# Patient Record
Sex: FEMALE | Race: WHITE | Employment: OTHER | URBAN - METROPOLITAN AREA
[De-identification: names, ages, dates, MRNs, and addresses within clinical notes are randomized per-mention and may not be internally consistent; named-entity substitution may affect disease eponyms.]

---

## 2017-01-03 ENCOUNTER — OFFICE VISIT (OUTPATIENT)
Dept: CARDIOLOGY CLINIC | Age: 82
End: 2017-01-03

## 2017-01-03 ENCOUNTER — ANTI-COAG VISIT (OUTPATIENT)
Dept: PHARMACY | Age: 82
End: 2017-01-03

## 2017-01-03 VITALS
WEIGHT: 159.8 LBS | SYSTOLIC BLOOD PRESSURE: 118 MMHG | HEART RATE: 97 BPM | HEIGHT: 65 IN | OXYGEN SATURATION: 90 % | BODY MASS INDEX: 26.62 KG/M2 | DIASTOLIC BLOOD PRESSURE: 72 MMHG

## 2017-01-03 DIAGNOSIS — I10 ESSENTIAL HYPERTENSION, BENIGN: ICD-10-CM

## 2017-01-03 DIAGNOSIS — R00.2 PALPITATIONS: ICD-10-CM

## 2017-01-03 DIAGNOSIS — I48.19 PERSISTENT ATRIAL FIBRILLATION (HCC): Primary | ICD-10-CM

## 2017-01-03 DIAGNOSIS — I48.91 ATRIAL FIBRILLATION, UNSPECIFIED TYPE (HCC): ICD-10-CM

## 2017-01-03 DIAGNOSIS — I35.0 SEVERE AORTIC STENOSIS: ICD-10-CM

## 2017-01-03 DIAGNOSIS — R06.02 SHORTNESS OF BREATH: ICD-10-CM

## 2017-01-03 DIAGNOSIS — R53.83 FATIGUE, UNSPECIFIED TYPE: ICD-10-CM

## 2017-01-03 LAB — INR BLD: 2.2

## 2017-01-03 PROCEDURE — 93000 ELECTROCARDIOGRAM COMPLETE: CPT | Performed by: INTERNAL MEDICINE

## 2017-01-03 PROCEDURE — 99205 OFFICE O/P NEW HI 60 MIN: CPT | Performed by: INTERNAL MEDICINE

## 2017-01-16 ENCOUNTER — TELEPHONE (OUTPATIENT)
Dept: INTERNAL MEDICINE CLINIC | Age: 82
End: 2017-01-16

## 2017-01-23 ENCOUNTER — ANTI-COAG VISIT (OUTPATIENT)
Dept: PHARMACY | Facility: CLINIC | Age: 82
End: 2017-01-23

## 2017-01-23 DIAGNOSIS — I48.19 PERSISTENT ATRIAL FIBRILLATION (HCC): ICD-10-CM

## 2017-01-23 LAB — INR BLD: 2.1

## 2017-01-31 ENCOUNTER — TELEPHONE (OUTPATIENT)
Dept: CARDIOLOGY CLINIC | Age: 82
End: 2017-01-31

## 2017-02-07 ENCOUNTER — OFFICE VISIT (OUTPATIENT)
Dept: CARDIOLOGY CLINIC | Age: 82
End: 2017-02-07

## 2017-02-07 VITALS
SYSTOLIC BLOOD PRESSURE: 124 MMHG | BODY MASS INDEX: 25.68 KG/M2 | OXYGEN SATURATION: 96 % | HEART RATE: 52 BPM | HEIGHT: 65 IN | DIASTOLIC BLOOD PRESSURE: 56 MMHG | WEIGHT: 154.12 LBS

## 2017-02-07 DIAGNOSIS — I48.0 PAROXYSMAL ATRIAL FIBRILLATION (HCC): Primary | ICD-10-CM

## 2017-02-07 DIAGNOSIS — I10 ESSENTIAL HYPERTENSION, BENIGN: ICD-10-CM

## 2017-02-07 DIAGNOSIS — I35.0 SEVERE AORTIC STENOSIS: ICD-10-CM

## 2017-02-07 DIAGNOSIS — Z95.2 S/P AVR: ICD-10-CM

## 2017-02-07 DIAGNOSIS — I48.91 ATRIAL FIBRILLATION WITH RVR (HCC): ICD-10-CM

## 2017-02-07 DIAGNOSIS — E78.00 PURE HYPERCHOLESTEROLEMIA: ICD-10-CM

## 2017-02-07 DIAGNOSIS — Z79.899 ON AMIODARONE THERAPY: ICD-10-CM

## 2017-02-07 PROCEDURE — G8482 FLU IMMUNIZE ORDER/ADMIN: HCPCS | Performed by: INTERNAL MEDICINE

## 2017-02-07 PROCEDURE — 1036F TOBACCO NON-USER: CPT | Performed by: INTERNAL MEDICINE

## 2017-02-07 PROCEDURE — 1090F PRES/ABSN URINE INCON ASSESS: CPT | Performed by: INTERNAL MEDICINE

## 2017-02-07 PROCEDURE — 4040F PNEUMOC VAC/ADMIN/RCVD: CPT | Performed by: INTERNAL MEDICINE

## 2017-02-07 PROCEDURE — 99214 OFFICE O/P EST MOD 30 MIN: CPT | Performed by: INTERNAL MEDICINE

## 2017-02-07 PROCEDURE — G8420 CALC BMI NORM PARAMETERS: HCPCS | Performed by: INTERNAL MEDICINE

## 2017-02-07 PROCEDURE — G8427 DOCREV CUR MEDS BY ELIG CLIN: HCPCS | Performed by: INTERNAL MEDICINE

## 2017-02-07 PROCEDURE — 1123F ACP DISCUSS/DSCN MKR DOCD: CPT | Performed by: INTERNAL MEDICINE

## 2017-02-07 PROCEDURE — 93000 ELECTROCARDIOGRAM COMPLETE: CPT | Performed by: INTERNAL MEDICINE

## 2017-02-07 RX ORDER — AMIODARONE HYDROCHLORIDE 200 MG/1
100 TABLET ORAL DAILY
Qty: 30 TABLET | Refills: 5
Start: 2017-02-07 | End: 2018-03-12 | Stop reason: SDUPTHER

## 2017-02-07 RX ORDER — FUROSEMIDE 40 MG/1
TABLET ORAL
Qty: 90 TABLET | Refills: 3
Start: 2017-02-07 | End: 2017-07-06 | Stop reason: DRUGHIGH

## 2017-02-20 ENCOUNTER — ANTI-COAG VISIT (OUTPATIENT)
Dept: PHARMACY | Facility: CLINIC | Age: 82
End: 2017-02-20

## 2017-02-20 DIAGNOSIS — Z79.899 ON AMIODARONE THERAPY: ICD-10-CM

## 2017-02-20 DIAGNOSIS — I48.19 PERSISTENT ATRIAL FIBRILLATION (HCC): ICD-10-CM

## 2017-02-20 LAB — INR BLD: 2.5

## 2017-02-21 LAB
ALT SERPL-CCNC: 13 U/L (ref 10–40)
AST SERPL-CCNC: 19 U/L (ref 15–37)
T4 FREE: 1.4 NG/DL (ref 0.9–1.8)
TSH REFLEX: 6.14 UIU/ML (ref 0.27–4.2)

## 2017-02-23 ENCOUNTER — TELEPHONE (OUTPATIENT)
Dept: CARDIOLOGY CLINIC | Age: 82
End: 2017-02-23

## 2017-02-24 ENCOUNTER — OFFICE VISIT (OUTPATIENT)
Dept: INTERNAL MEDICINE CLINIC | Age: 82
End: 2017-02-24

## 2017-02-24 VITALS
SYSTOLIC BLOOD PRESSURE: 150 MMHG | DIASTOLIC BLOOD PRESSURE: 60 MMHG | BODY MASS INDEX: 27.09 KG/M2 | WEIGHT: 162.8 LBS | HEART RATE: 56 BPM

## 2017-02-24 DIAGNOSIS — L29.8 PRURITIC ERYTHEMATOUS RASH: Primary | ICD-10-CM

## 2017-02-24 PROCEDURE — 4040F PNEUMOC VAC/ADMIN/RCVD: CPT | Performed by: INTERNAL MEDICINE

## 2017-02-24 PROCEDURE — G8427 DOCREV CUR MEDS BY ELIG CLIN: HCPCS | Performed by: INTERNAL MEDICINE

## 2017-02-24 PROCEDURE — 1090F PRES/ABSN URINE INCON ASSESS: CPT | Performed by: INTERNAL MEDICINE

## 2017-02-24 PROCEDURE — 99213 OFFICE O/P EST LOW 20 MIN: CPT | Performed by: INTERNAL MEDICINE

## 2017-02-24 PROCEDURE — 1123F ACP DISCUSS/DSCN MKR DOCD: CPT | Performed by: INTERNAL MEDICINE

## 2017-02-24 PROCEDURE — G8420 CALC BMI NORM PARAMETERS: HCPCS | Performed by: INTERNAL MEDICINE

## 2017-02-24 PROCEDURE — 1036F TOBACCO NON-USER: CPT | Performed by: INTERNAL MEDICINE

## 2017-02-24 PROCEDURE — G8482 FLU IMMUNIZE ORDER/ADMIN: HCPCS | Performed by: INTERNAL MEDICINE

## 2017-02-25 ASSESSMENT — ENCOUNTER SYMPTOMS
RESPIRATORY NEGATIVE: 1
ALLERGIC/IMMUNOLOGIC NEGATIVE: 1
EYES NEGATIVE: 1
GASTROINTESTINAL NEGATIVE: 1

## 2017-02-28 ENCOUNTER — TELEPHONE (OUTPATIENT)
Dept: CARDIOLOGY CLINIC | Age: 82
End: 2017-02-28

## 2017-02-28 DIAGNOSIS — E03.9 HYPOTHYROIDISM, UNSPECIFIED TYPE: Primary | ICD-10-CM

## 2017-02-28 DIAGNOSIS — Z79.899 ON AMIODARONE THERAPY: ICD-10-CM

## 2017-03-09 ENCOUNTER — OFFICE VISIT (OUTPATIENT)
Dept: CARDIOLOGY CLINIC | Age: 82
End: 2017-03-09

## 2017-03-09 VITALS
SYSTOLIC BLOOD PRESSURE: 126 MMHG | BODY MASS INDEX: 27.66 KG/M2 | DIASTOLIC BLOOD PRESSURE: 60 MMHG | WEIGHT: 166 LBS | HEART RATE: 60 BPM | HEIGHT: 65 IN

## 2017-03-09 DIAGNOSIS — Z95.2 S/P AVR: Primary | ICD-10-CM

## 2017-03-09 PROCEDURE — G8427 DOCREV CUR MEDS BY ELIG CLIN: HCPCS | Performed by: INTERNAL MEDICINE

## 2017-03-09 PROCEDURE — 1123F ACP DISCUSS/DSCN MKR DOCD: CPT | Performed by: INTERNAL MEDICINE

## 2017-03-09 PROCEDURE — 99214 OFFICE O/P EST MOD 30 MIN: CPT | Performed by: INTERNAL MEDICINE

## 2017-03-09 PROCEDURE — 1036F TOBACCO NON-USER: CPT | Performed by: INTERNAL MEDICINE

## 2017-03-09 PROCEDURE — G8482 FLU IMMUNIZE ORDER/ADMIN: HCPCS | Performed by: INTERNAL MEDICINE

## 2017-03-09 PROCEDURE — G8420 CALC BMI NORM PARAMETERS: HCPCS | Performed by: INTERNAL MEDICINE

## 2017-03-09 PROCEDURE — 1090F PRES/ABSN URINE INCON ASSESS: CPT | Performed by: INTERNAL MEDICINE

## 2017-03-09 PROCEDURE — 4040F PNEUMOC VAC/ADMIN/RCVD: CPT | Performed by: INTERNAL MEDICINE

## 2017-03-20 ENCOUNTER — ANTI-COAG VISIT (OUTPATIENT)
Dept: PHARMACY | Facility: CLINIC | Age: 82
End: 2017-03-20

## 2017-03-20 DIAGNOSIS — Z79.899 ON AMIODARONE THERAPY: ICD-10-CM

## 2017-03-20 DIAGNOSIS — E03.9 HYPOTHYROIDISM, UNSPECIFIED TYPE: ICD-10-CM

## 2017-03-20 DIAGNOSIS — I48.19 PERSISTENT ATRIAL FIBRILLATION (HCC): ICD-10-CM

## 2017-03-20 LAB
INR BLD: 3.4
T4 FREE: 1.5 NG/DL (ref 0.9–1.8)
TSH REFLEX: 7.23 UIU/ML (ref 0.27–4.2)

## 2017-03-21 ENCOUNTER — TELEPHONE (OUTPATIENT)
Dept: CARDIOLOGY CLINIC | Age: 82
End: 2017-03-21

## 2017-03-27 ENCOUNTER — TELEPHONE (OUTPATIENT)
Dept: CARDIOLOGY CLINIC | Age: 82
End: 2017-03-27

## 2017-03-27 ENCOUNTER — TELEPHONE (OUTPATIENT)
Dept: INTERNAL MEDICINE CLINIC | Age: 82
End: 2017-03-27

## 2017-03-28 ENCOUNTER — TELEPHONE (OUTPATIENT)
Dept: CARDIOLOGY | Age: 82
End: 2017-03-28

## 2017-03-28 ENCOUNTER — TELEPHONE (OUTPATIENT)
Dept: INTERNAL MEDICINE CLINIC | Age: 82
End: 2017-03-28

## 2017-03-28 RX ORDER — AMLODIPINE BESYLATE 5 MG/1
5 TABLET ORAL DAILY
Qty: 30 TABLET | Refills: 5 | Status: SHIPPED | OUTPATIENT
Start: 2017-03-28 | End: 2017-05-04 | Stop reason: ALTCHOICE

## 2017-03-29 RX ORDER — WARFARIN SODIUM 5 MG/1
TABLET ORAL
Qty: 30 TABLET | Refills: 0 | Status: SHIPPED | OUTPATIENT
Start: 2017-03-29 | End: 2017-04-17 | Stop reason: SDUPTHER

## 2017-03-30 ENCOUNTER — TELEPHONE (OUTPATIENT)
Dept: CARDIOLOGY | Age: 82
End: 2017-03-30

## 2017-03-30 ENCOUNTER — TELEPHONE (OUTPATIENT)
Dept: INTERNAL MEDICINE CLINIC | Age: 82
End: 2017-03-30

## 2017-03-30 DIAGNOSIS — M54.9 CHRONIC BACK PAIN, UNSPECIFIED BACK LOCATION, UNSPECIFIED BACK PAIN LATERALITY: Primary | ICD-10-CM

## 2017-03-30 DIAGNOSIS — G89.29 CHRONIC BACK PAIN, UNSPECIFIED BACK LOCATION, UNSPECIFIED BACK PAIN LATERALITY: Primary | ICD-10-CM

## 2017-03-30 RX ORDER — LEVOTHYROXINE SODIUM 0.1 MG/1
100 TABLET ORAL DAILY
Qty: 30 TABLET | Refills: 3 | Status: CANCELLED | OUTPATIENT
Start: 2017-03-30

## 2017-03-30 RX ORDER — HYDROCODONE BITARTRATE AND ACETAMINOPHEN 5; 325 MG/1; MG/1
1 TABLET ORAL EVERY 8 HOURS PRN
Qty: 60 TABLET | Refills: 0 | Status: CANCELLED | OUTPATIENT
Start: 2017-03-30 | End: 2017-04-29

## 2017-03-31 ENCOUNTER — TELEPHONE (OUTPATIENT)
Dept: CARDIOLOGY | Age: 82
End: 2017-03-31

## 2017-03-31 DIAGNOSIS — E03.9 HYPOTHYROIDISM, UNSPECIFIED TYPE: ICD-10-CM

## 2017-03-31 RX ORDER — HYDROCODONE BITARTRATE AND ACETAMINOPHEN 5; 325 MG/1; MG/1
1 TABLET ORAL EVERY 8 HOURS PRN
Qty: 60 TABLET | Refills: 0 | Status: SHIPPED | OUTPATIENT
Start: 2017-03-31 | End: 2017-04-30

## 2017-03-31 RX ORDER — LEVOTHYROXINE SODIUM 0.1 MG/1
TABLET ORAL
Qty: 90 TABLET | Refills: 1 | Status: SHIPPED | OUTPATIENT
Start: 2017-03-31 | End: 2017-10-12 | Stop reason: SDUPTHER

## 2017-04-17 ENCOUNTER — ANTI-COAG VISIT (OUTPATIENT)
Dept: PHARMACY | Facility: CLINIC | Age: 82
End: 2017-04-17

## 2017-04-17 DIAGNOSIS — I48.19 PERSISTENT ATRIAL FIBRILLATION (HCC): ICD-10-CM

## 2017-04-17 LAB — INR BLD: 4.1

## 2017-04-18 ENCOUNTER — TELEPHONE (OUTPATIENT)
Dept: INTERNAL MEDICINE CLINIC | Age: 82
End: 2017-04-18

## 2017-04-18 RX ORDER — DEXAMETHASONE 4 MG/1
4 TABLET ORAL 2 TIMES DAILY WITH MEALS
Qty: 20 TABLET | Refills: 0 | Status: SHIPPED | OUTPATIENT
Start: 2017-04-18 | End: 2017-04-28

## 2017-04-21 ENCOUNTER — TELEPHONE (OUTPATIENT)
Dept: CARDIOLOGY CLINIC | Age: 82
End: 2017-04-21

## 2017-04-21 DIAGNOSIS — R42 DIZZINESS: ICD-10-CM

## 2017-04-21 DIAGNOSIS — I48.91 ATRIAL FIBRILLATION WITH RVR (HCC): Primary | ICD-10-CM

## 2017-04-24 ENCOUNTER — TELEPHONE (OUTPATIENT)
Dept: CARDIOLOGY CLINIC | Age: 82
End: 2017-04-24

## 2017-04-24 DIAGNOSIS — E78.2 MIXED HYPERLIPIDEMIA: ICD-10-CM

## 2017-04-24 RX ORDER — ATORVASTATIN CALCIUM 10 MG/1
TABLET, FILM COATED ORAL
Qty: 90 TABLET | Refills: 2 | Status: SHIPPED | OUTPATIENT
Start: 2017-04-24 | End: 2017-09-05 | Stop reason: ALTCHOICE

## 2017-04-26 ENCOUNTER — NURSE ONLY (OUTPATIENT)
Dept: CARDIOLOGY CLINIC | Age: 82
End: 2017-04-26

## 2017-04-26 DIAGNOSIS — I48.91 ATRIAL FIBRILLATION WITH RVR (HCC): ICD-10-CM

## 2017-04-26 DIAGNOSIS — I48.0 PAROXYSMAL ATRIAL FIBRILLATION (HCC): Primary | ICD-10-CM

## 2017-04-26 DIAGNOSIS — R42 DIZZINESS: ICD-10-CM

## 2017-04-30 PROCEDURE — 93225 XTRNL ECG REC<48 HRS REC: CPT | Performed by: INTERNAL MEDICINE

## 2017-05-03 ENCOUNTER — OFFICE VISIT (OUTPATIENT)
Dept: INTERNAL MEDICINE CLINIC | Age: 82
End: 2017-05-03

## 2017-05-03 ENCOUNTER — HOSPITAL ENCOUNTER (OUTPATIENT)
Dept: OTHER | Age: 82
Discharge: HOME | End: 2017-05-03
Attending: NURSE PRACTITIONER | Admitting: NURSE PRACTITIONER

## 2017-05-03 VITALS — HEART RATE: 56 BPM | SYSTOLIC BLOOD PRESSURE: 112 MMHG | DIASTOLIC BLOOD PRESSURE: 58 MMHG
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
## 2017-05-03 DIAGNOSIS — M54.50 CHRONIC RIGHT-SIDED LOW BACK PAIN WITHOUT SCIATICA: ICD-10-CM

## 2017-05-03 DIAGNOSIS — G89.29 CHRONIC RIGHT SACROILIAC PAIN: ICD-10-CM

## 2017-05-03 DIAGNOSIS — I10 ESSENTIAL HYPERTENSION, BENIGN: ICD-10-CM

## 2017-05-03 DIAGNOSIS — Z74.09 DECREASED AMBULATION STATUS: Primary | ICD-10-CM
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
## 2017-05-03 DIAGNOSIS — B37.2 CANDIDAL INTERTRIGO: ICD-10-CM

## 2017-05-03 DIAGNOSIS — M53.3 CHRONIC RIGHT SACROILIAC PAIN: ICD-10-CM

## 2017-05-03 DIAGNOSIS — I48.19 PERSISTENT ATRIAL FIBRILLATION (HCC): ICD-10-CM

## 2017-05-03 DIAGNOSIS — G89.29 CHRONIC RIGHT-SIDED LOW BACK PAIN WITHOUT SCIATICA: ICD-10-CM

## 2017-05-03 DIAGNOSIS — Z23 NEED FOR PNEUMOCOCCAL VACCINATION: ICD-10-CM

## 2017-05-03 PROCEDURE — 1090F PRES/ABSN URINE INCON ASSESS: CPT | Performed by: NURSE PRACTITIONER

## 2017-05-03 PROCEDURE — G0009 ADMIN PNEUMOCOCCAL VACCINE: HCPCS | Performed by: NURSE PRACTITIONER

## 2017-05-03 PROCEDURE — 90670 PCV13 VACCINE IM: CPT | Performed by: NURSE PRACTITIONER

## 2017-05-03 PROCEDURE — 93227 XTRNL ECG REC<48 HR R&I: CPT | Performed by: INTERNAL MEDICINE

## 2017-05-03 PROCEDURE — 4040F PNEUMOC VAC/ADMIN/RCVD: CPT | Performed by: NURSE PRACTITIONER

## 2017-05-03 PROCEDURE — G8427 DOCREV CUR MEDS BY ELIG CLIN: HCPCS | Performed by: NURSE PRACTITIONER

## 2017-05-03 PROCEDURE — 1036F TOBACCO NON-USER: CPT | Performed by: NURSE PRACTITIONER

## 2017-05-03 PROCEDURE — 99214 OFFICE O/P EST MOD 30 MIN: CPT | Performed by: NURSE PRACTITIONER

## 2017-05-03 PROCEDURE — 1123F ACP DISCUSS/DSCN MKR DOCD: CPT | Performed by: NURSE PRACTITIONER

## 2017-05-03 PROCEDURE — G8420 CALC BMI NORM PARAMETERS: HCPCS | Performed by: NURSE PRACTITIONER

## 2017-05-03 RX ORDER — NYSTATIN 100000 [USP'U]/G
POWDER TOPICAL
Qty: 30 G | Refills: 3 | Status: SHIPPED | OUTPATIENT
Start: 2017-05-03

## 2017-05-03 ASSESSMENT — ENCOUNTER SYMPTOMS
SHORTNESS OF BREATH: 0
GASTROINTESTINAL NEGATIVE: 1
BACK PAIN: 1

## 2017-05-03 ASSESSMENT — PATIENT HEALTH QUESTIONNAIRE - PHQ9
SUM OF ALL RESPONSES TO PHQ9 QUESTIONS 1 & 2: 2
SUM OF ALL RESPONSES TO PHQ QUESTIONS 1-9: 2
1. LITTLE INTEREST OR PLEASURE IN DOING THINGS: 1
2. FEELING DOWN, DEPRESSED OR HOPELESS: 1

## 2017-05-04 ENCOUNTER — TELEPHONE (OUTPATIENT)
Dept: CARDIOLOGY CLINIC | Age: 82
End: 2017-05-04

## 2017-05-08 ENCOUNTER — ANTI-COAG VISIT (OUTPATIENT)
Dept: PHARMACY | Facility: CLINIC | Age: 82
End: 2017-05-08

## 2017-05-08 ENCOUNTER — TELEPHONE (OUTPATIENT)
Dept: RHEUMATOLOGY | Age: 82
End: 2017-05-08

## 2017-05-08 DIAGNOSIS — I48.19 PERSISTENT ATRIAL FIBRILLATION (HCC): ICD-10-CM

## 2017-05-08 LAB — INR BLD: 5.7

## 2017-05-08 RX ORDER — HYDROCODONE BITARTRATE AND ACETAMINOPHEN 5; 325 MG/1; MG/1
1 TABLET ORAL EVERY 8 HOURS PRN
COMMUNITY
End: 2017-05-10 | Stop reason: SDUPTHER

## 2017-05-10 ENCOUNTER — TELEPHONE (OUTPATIENT)
Dept: INTERNAL MEDICINE CLINIC | Age: 82
End: 2017-05-10

## 2017-05-10 RX ORDER — HYDROCODONE BITARTRATE AND ACETAMINOPHEN 5; 325 MG/1; MG/1
1 TABLET ORAL EVERY 8 HOURS PRN
Qty: 90 TABLET | Refills: 0 | Status: SHIPPED | OUTPATIENT
Start: 2017-05-10 | End: 2017-06-26

## 2017-05-15 ENCOUNTER — ANTI-COAG VISIT (OUTPATIENT)
Dept: PHARMACY | Facility: CLINIC | Age: 82
End: 2017-05-15

## 2017-05-15 DIAGNOSIS — I48.19 PERSISTENT ATRIAL FIBRILLATION (HCC): ICD-10-CM

## 2017-05-15 LAB — INR BLD: 1

## 2017-05-22 ENCOUNTER — ANTI-COAG VISIT (OUTPATIENT)
Dept: PHARMACY | Facility: CLINIC | Age: 82
End: 2017-05-22

## 2017-05-22 DIAGNOSIS — I48.19 PERSISTENT ATRIAL FIBRILLATION (HCC): ICD-10-CM

## 2017-05-22 LAB — INR BLD: 1.3

## 2017-05-23 ENCOUNTER — TELEPHONE (OUTPATIENT)
Dept: CARDIOLOGY CLINIC | Age: 82
End: 2017-05-23

## 2017-05-30 ENCOUNTER — ANTI-COAG VISIT (OUTPATIENT)
Dept: PHARMACY | Facility: CLINIC | Age: 82
End: 2017-05-30

## 2017-05-30 DIAGNOSIS — I48.19 PERSISTENT ATRIAL FIBRILLATION (HCC): ICD-10-CM

## 2017-05-30 LAB — INR BLD: 1.6

## 2017-06-01 ENCOUNTER — TELEPHONE (OUTPATIENT)
Dept: PHARMACY | Facility: CLINIC | Age: 82
End: 2017-06-01

## 2017-06-01 RX ORDER — WARFARIN SODIUM 5 MG/1
TABLET ORAL
Qty: 30 TABLET | Refills: 11 | Status: SHIPPED | OUTPATIENT
Start: 2017-06-01 | End: 2017-06-12 | Stop reason: SDUPTHER

## 2017-06-12 ENCOUNTER — TELEPHONE (OUTPATIENT)
Dept: INTERNAL MEDICINE CLINIC | Age: 82
End: 2017-06-12

## 2017-06-12 ENCOUNTER — ANTI-COAG VISIT (OUTPATIENT)
Dept: PHARMACY | Facility: CLINIC | Age: 82
End: 2017-06-12

## 2017-06-12 DIAGNOSIS — I48.19 PERSISTENT ATRIAL FIBRILLATION (HCC): ICD-10-CM

## 2017-06-12 LAB — INR BLD: 1.8

## 2017-06-26 ENCOUNTER — HOSPITAL ENCOUNTER (OUTPATIENT)
Dept: INFUSION THERAPY | Age: 82
Discharge: HOME | End: 2017-06-30
Attending: INTERNAL MEDICINE | Admitting: INTERNAL MEDICINE

## 2017-06-26 ENCOUNTER — ANTI-COAG VISIT (OUTPATIENT)
Dept: PHARMACY | Facility: CLINIC | Age: 82
End: 2017-06-26

## 2017-06-26 VITALS
TEMPERATURE: 98.4 F | OXYGEN SATURATION: 99 % | RESPIRATION RATE: 17 BRPM | HEART RATE: 64 BPM | SYSTOLIC BLOOD PRESSURE: 146 MMHG | DIASTOLIC BLOOD PRESSURE: 84 MMHG

## 2017-06-26 DIAGNOSIS — I48.19 PERSISTENT ATRIAL FIBRILLATION (HCC): ICD-10-CM

## 2017-06-26 LAB — INR BLD: 2.1

## 2017-06-26 RX ORDER — SODIUM CHLORIDE 0.9 % (FLUSH) 0.9 %
10 SYRINGE (ML) INJECTION PRN
Status: DISCONTINUED | OUTPATIENT
Start: 2017-06-26 | End: 2017-07-01 | Stop reason: HOSPADM

## 2017-06-26 RX ORDER — METRONIDAZOLE 7.5 MG/G
1 GEL TOPICAL DAILY PRN
COMMUNITY

## 2017-06-26 RX ADMIN — Medication 10 ML: at 13:09

## 2017-07-03 ENCOUNTER — HOSPITAL ENCOUNTER (OUTPATIENT)
Dept: INFUSION THERAPY | Age: 82
Discharge: HOME | End: 2017-07-04
Admitting: INTERNAL MEDICINE

## 2017-07-03 VITALS
DIASTOLIC BLOOD PRESSURE: 68 MMHG | HEART RATE: 68 BPM | TEMPERATURE: 97.9 F | SYSTOLIC BLOOD PRESSURE: 152 MMHG | OXYGEN SATURATION: 97 % | RESPIRATION RATE: 17 BRPM

## 2017-07-03 RX ORDER — SODIUM CHLORIDE 0.9 % (FLUSH) 0.9 %
10 SYRINGE (ML) INJECTION PRN
Status: DISCONTINUED | OUTPATIENT
Start: 2017-07-03 | End: 2018-05-01 | Stop reason: HOSPADM

## 2017-07-03 RX ORDER — SODIUM CHLORIDE 9 MG/ML
INJECTION, SOLUTION INTRAVENOUS CONTINUOUS
Status: DISCONTINUED | OUTPATIENT
Start: 2017-07-03 | End: 2018-05-01 | Stop reason: HOSPADM

## 2017-07-03 RX ADMIN — Medication 10 ML: at 14:17

## 2017-07-03 RX ADMIN — SODIUM CHLORIDE: 9 INJECTION, SOLUTION INTRAVENOUS at 12:28

## 2017-07-03 RX ADMIN — Medication 10 ML: at 14:32

## 2017-07-03 RX ADMIN — Medication 10 ML: at 13:15

## 2017-07-03 RX ADMIN — Medication 20 ML: at 12:28

## 2017-07-05 ENCOUNTER — TELEPHONE (OUTPATIENT)
Dept: CARDIOLOGY CLINIC | Age: 82
End: 2017-07-05

## 2017-07-06 ENCOUNTER — HOSPITAL ENCOUNTER (OUTPATIENT)
Dept: ENDOSCOPY | Age: 82
Discharge: HOME | End: 2017-07-06
Attending: INTERNAL MEDICINE | Admitting: INTERNAL MEDICINE

## 2017-07-06 ENCOUNTER — OFFICE VISIT (OUTPATIENT)
Dept: CARDIOLOGY CLINIC | Age: 82
End: 2017-07-06

## 2017-07-06 VITALS
HEART RATE: 68 BPM | DIASTOLIC BLOOD PRESSURE: 56 MMHG | SYSTOLIC BLOOD PRESSURE: 138 MMHG | BODY MASS INDEX: 25.99 KG/M2 | WEIGHT: 156 LBS | HEIGHT: 65 IN

## 2017-07-06 DIAGNOSIS — Z95.2 S/P TAVR (TRANSCATHETER AORTIC VALVE REPLACEMENT): ICD-10-CM

## 2017-07-06 DIAGNOSIS — I35.0 NONRHEUMATIC AORTIC VALVE STENOSIS: Primary | ICD-10-CM

## 2017-07-06 DIAGNOSIS — I48.0 PAROXYSMAL ATRIAL FIBRILLATION (HCC): ICD-10-CM

## 2017-07-06 DIAGNOSIS — I10 ESSENTIAL HYPERTENSION: ICD-10-CM

## 2017-07-06 DIAGNOSIS — E78.00 HYPERCHOLESTEREMIA: ICD-10-CM

## 2017-07-06 PROCEDURE — 99214 OFFICE O/P EST MOD 30 MIN: CPT | Performed by: INTERNAL MEDICINE

## 2017-07-06 PROCEDURE — G8427 DOCREV CUR MEDS BY ELIG CLIN: HCPCS | Performed by: INTERNAL MEDICINE

## 2017-07-06 PROCEDURE — 1123F ACP DISCUSS/DSCN MKR DOCD: CPT | Performed by: INTERNAL MEDICINE

## 2017-07-06 PROCEDURE — 1036F TOBACCO NON-USER: CPT | Performed by: INTERNAL MEDICINE

## 2017-07-06 PROCEDURE — 4040F PNEUMOC VAC/ADMIN/RCVD: CPT | Performed by: INTERNAL MEDICINE

## 2017-07-06 PROCEDURE — 1090F PRES/ABSN URINE INCON ASSESS: CPT | Performed by: INTERNAL MEDICINE

## 2017-07-06 PROCEDURE — G8419 CALC BMI OUT NRM PARAM NOF/U: HCPCS | Performed by: INTERNAL MEDICINE

## 2017-07-06 RX ORDER — FUROSEMIDE 20 MG/1
TABLET ORAL
Qty: 90 TABLET | Refills: 3 | Status: SHIPPED
Start: 2017-07-06 | End: 2017-11-06 | Stop reason: SDUPTHER

## 2017-07-10 ENCOUNTER — HOSPITAL ENCOUNTER (OUTPATIENT)
Dept: OTHER | Age: 82
Discharge: HOME | End: 2017-07-10
Attending: INTERNAL MEDICINE | Admitting: INTERNAL MEDICINE

## 2017-07-10 ENCOUNTER — OFFICE VISIT (OUTPATIENT)
Dept: INTERNAL MEDICINE CLINIC | Age: 82
End: 2017-07-10

## 2017-07-10 VITALS
DIASTOLIC BLOOD PRESSURE: 72 MMHG | RESPIRATION RATE: 14 BRPM | BODY MASS INDEX: 25.89 KG/M2 | HEART RATE: 64 BPM | SYSTOLIC BLOOD PRESSURE: 128 MMHG | WEIGHT: 155.6 LBS

## 2017-07-10 DIAGNOSIS — E03.9 HYPOTHYROIDISM, UNSPECIFIED TYPE: ICD-10-CM

## 2017-07-10 DIAGNOSIS — N39.41 URGE INCONTINENCE OF URINE: Primary | ICD-10-CM

## 2017-07-10 DIAGNOSIS — I10 ESSENTIAL HYPERTENSION, BENIGN: ICD-10-CM

## 2017-07-10 LAB — TSH SERPL DL<=0.05 MIU/L-ACNC: 3.79 UIU/ML (ref 0.27–4.2)

## 2017-07-10 PROCEDURE — 1090F PRES/ABSN URINE INCON ASSESS: CPT | Performed by: INTERNAL MEDICINE

## 2017-07-10 PROCEDURE — 4040F PNEUMOC VAC/ADMIN/RCVD: CPT | Performed by: INTERNAL MEDICINE

## 2017-07-10 PROCEDURE — 0509F URINE INCON PLAN DOCD: CPT | Performed by: INTERNAL MEDICINE

## 2017-07-10 PROCEDURE — 99213 OFFICE O/P EST LOW 20 MIN: CPT | Performed by: INTERNAL MEDICINE

## 2017-07-10 PROCEDURE — G8427 DOCREV CUR MEDS BY ELIG CLIN: HCPCS | Performed by: INTERNAL MEDICINE

## 2017-07-10 PROCEDURE — 1123F ACP DISCUSS/DSCN MKR DOCD: CPT | Performed by: INTERNAL MEDICINE

## 2017-07-10 PROCEDURE — G8419 CALC BMI OUT NRM PARAM NOF/U: HCPCS | Performed by: INTERNAL MEDICINE

## 2017-07-10 PROCEDURE — 1036F TOBACCO NON-USER: CPT | Performed by: INTERNAL MEDICINE

## 2017-07-13 ENCOUNTER — TELEPHONE (OUTPATIENT)
Dept: RHEUMATOLOGY | Age: 82
End: 2017-07-13

## 2017-07-16 ASSESSMENT — ENCOUNTER SYMPTOMS
EYES NEGATIVE: 1
RESPIRATORY NEGATIVE: 1
ALLERGIC/IMMUNOLOGIC NEGATIVE: 1
GASTROINTESTINAL NEGATIVE: 1

## 2017-07-17 ENCOUNTER — ANTI-COAG VISIT (OUTPATIENT)
Dept: PHARMACY | Facility: CLINIC | Age: 82
End: 2017-07-17

## 2017-07-17 DIAGNOSIS — I48.19 PERSISTENT ATRIAL FIBRILLATION (HCC): ICD-10-CM

## 2017-07-17 LAB — INR BLD: 1.5

## 2017-07-24 DIAGNOSIS — N39.41 URGE INCONTINENCE OF URINE: ICD-10-CM

## 2017-07-27 ENCOUNTER — HOSPITAL ENCOUNTER (OUTPATIENT)
Dept: NON INVASIVE DIAGNOSTICS | Age: 82
Discharge: HOME | End: 2017-07-27
Attending: INTERNAL MEDICINE | Admitting: INTERNAL MEDICINE

## 2017-07-27 DIAGNOSIS — I35.0 NONRHEUMATIC AORTIC VALVE STENOSIS: ICD-10-CM

## 2017-07-27 LAB
LEFT VENTRICULAR EJECTION FRACTION HIGH VALUE: 60 %
LEFT VENTRICULAR EJECTION FRACTION MODE: NORMAL
LV EF: 60 %
LVEF MODALITY: NORMAL

## 2017-07-28 ENCOUNTER — TELEPHONE (OUTPATIENT)
Dept: INTERNAL MEDICINE CLINIC | Age: 82
End: 2017-07-28

## 2017-07-28 RX ORDER — TROSPIUM CHLORIDE 20 MG/1
20 TABLET, FILM COATED ORAL 2 TIMES DAILY
Qty: 60 TABLET | Refills: 0 | Status: SHIPPED | OUTPATIENT
Start: 2017-07-28 | End: 2017-07-28 | Stop reason: SDUPTHER

## 2017-07-31 ENCOUNTER — ANTI-COAG VISIT (OUTPATIENT)
Dept: PHARMACY | Facility: CLINIC | Age: 82
End: 2017-07-31

## 2017-07-31 DIAGNOSIS — I48.19 PERSISTENT ATRIAL FIBRILLATION (HCC): ICD-10-CM

## 2017-07-31 LAB — INR BLD: 2.8

## 2017-07-31 RX ORDER — TROSPIUM CHLORIDE 20 MG/1
TABLET, FILM COATED ORAL
Qty: 180 TABLET | Refills: 0 | Status: SHIPPED | OUTPATIENT
Start: 2017-07-31 | End: 2018-03-19 | Stop reason: ALTCHOICE

## 2017-08-04 ENCOUNTER — TELEPHONE (OUTPATIENT)
Dept: CARDIOLOGY CLINIC | Age: 82
End: 2017-08-04

## 2017-08-07 ENCOUNTER — OFFICE VISIT (OUTPATIENT)
Dept: CARDIOLOGY CLINIC | Age: 82
End: 2017-08-07

## 2017-08-07 VITALS
HEIGHT: 65 IN | SYSTOLIC BLOOD PRESSURE: 138 MMHG | HEART RATE: 92 BPM | OXYGEN SATURATION: 94 % | BODY MASS INDEX: 25.83 KG/M2 | DIASTOLIC BLOOD PRESSURE: 72 MMHG | WEIGHT: 155 LBS

## 2017-08-07 DIAGNOSIS — I34.0 NON-RHEUMATIC MITRAL REGURGITATION: ICD-10-CM

## 2017-08-07 DIAGNOSIS — I35.0 NONRHEUMATIC AORTIC VALVE STENOSIS: ICD-10-CM

## 2017-08-07 DIAGNOSIS — I48.91 ATRIAL FIBRILLATION WITH RVR (HCC): Primary | ICD-10-CM

## 2017-08-07 DIAGNOSIS — Z79.899 ON AMIODARONE THERAPY: ICD-10-CM

## 2017-08-07 DIAGNOSIS — Z95.2 S/P AVR: ICD-10-CM

## 2017-08-07 DIAGNOSIS — E78.00 PURE HYPERCHOLESTEROLEMIA: ICD-10-CM

## 2017-08-07 PROCEDURE — 1090F PRES/ABSN URINE INCON ASSESS: CPT | Performed by: INTERNAL MEDICINE

## 2017-08-07 PROCEDURE — 93000 ELECTROCARDIOGRAM COMPLETE: CPT | Performed by: INTERNAL MEDICINE

## 2017-08-07 PROCEDURE — G8419 CALC BMI OUT NRM PARAM NOF/U: HCPCS | Performed by: INTERNAL MEDICINE

## 2017-08-07 PROCEDURE — G8427 DOCREV CUR MEDS BY ELIG CLIN: HCPCS | Performed by: INTERNAL MEDICINE

## 2017-08-07 PROCEDURE — 1036F TOBACCO NON-USER: CPT | Performed by: INTERNAL MEDICINE

## 2017-08-07 PROCEDURE — 4040F PNEUMOC VAC/ADMIN/RCVD: CPT | Performed by: INTERNAL MEDICINE

## 2017-08-07 PROCEDURE — 1123F ACP DISCUSS/DSCN MKR DOCD: CPT | Performed by: INTERNAL MEDICINE

## 2017-08-07 PROCEDURE — 99214 OFFICE O/P EST MOD 30 MIN: CPT | Performed by: INTERNAL MEDICINE

## 2017-08-14 ENCOUNTER — ANTI-COAG VISIT (OUTPATIENT)
Dept: PHARMACY | Facility: CLINIC | Age: 82
End: 2017-08-14

## 2017-08-14 DIAGNOSIS — Z79.899 ON AMIODARONE THERAPY: ICD-10-CM

## 2017-08-14 DIAGNOSIS — I48.19 PERSISTENT ATRIAL FIBRILLATION (HCC): ICD-10-CM

## 2017-08-14 LAB — INR BLD: 3.4

## 2017-08-15 LAB
ALT SERPL-CCNC: 12 U/L (ref 10–40)
AST SERPL-CCNC: 17 U/L (ref 15–37)
BASOPHILS ABSOLUTE: 0 K/UL (ref 0–0.2)
BASOPHILS RELATIVE PERCENT: 0.5 %
EOSINOPHILS ABSOLUTE: 0.2 K/UL (ref 0–0.6)
EOSINOPHILS RELATIVE PERCENT: 3.2 %
FERRITIN: 598.2 NG/ML (ref 15–150)
HCT VFR BLD CALC: 36.9 % (ref 36–48)
HEMOGLOBIN: 12.2 G/DL (ref 12–16)
IRON SATURATION: 23 % (ref 15–50)
IRON: 37 UG/DL (ref 37–145)
LYMPHOCYTES ABSOLUTE: 1.9 K/UL (ref 1–5.1)
LYMPHOCYTES RELATIVE PERCENT: 29.9 %
MCH RBC QN AUTO: 30.5 PG (ref 26–34)
MCHC RBC AUTO-ENTMCNC: 33 G/DL (ref 31–36)
MCV RBC AUTO: 92.3 FL (ref 80–100)
MONOCYTES ABSOLUTE: 0.5 K/UL (ref 0–1.3)
MONOCYTES RELATIVE PERCENT: 8.2 %
NEUTROPHILS ABSOLUTE: 3.8 K/UL (ref 1.7–7.7)
NEUTROPHILS RELATIVE PERCENT: 58.2 %
PDW BLD-RTO: 21.9 % (ref 12.4–15.4)
PLATELET # BLD: 164 K/UL (ref 135–450)
PMV BLD AUTO: 7.7 FL (ref 5–10.5)
RBC # BLD: 4 M/UL (ref 4–5.2)
TOTAL IRON BINDING CAPACITY: 158 UG/DL (ref 260–445)
WBC # BLD: 6.5 K/UL (ref 4–11)

## 2017-08-17 DIAGNOSIS — I48.91 ATRIAL FIBRILLATION WITH RVR (HCC): ICD-10-CM

## 2017-08-21 ENCOUNTER — OFFICE VISIT (OUTPATIENT)
Dept: INTERNAL MEDICINE CLINIC | Age: 82
End: 2017-08-21

## 2017-08-21 VITALS
BODY MASS INDEX: 25.89 KG/M2 | SYSTOLIC BLOOD PRESSURE: 148 MMHG | RESPIRATION RATE: 14 BRPM | DIASTOLIC BLOOD PRESSURE: 68 MMHG | HEART RATE: 72 BPM | WEIGHT: 155.6 LBS

## 2017-08-21 DIAGNOSIS — I10 ESSENTIAL HYPERTENSION, BENIGN: ICD-10-CM

## 2017-08-21 DIAGNOSIS — N39.41 URGE INCONTINENCE OF URINE: Primary | ICD-10-CM

## 2017-08-21 PROCEDURE — G8427 DOCREV CUR MEDS BY ELIG CLIN: HCPCS | Performed by: INTERNAL MEDICINE

## 2017-08-21 PROCEDURE — 1090F PRES/ABSN URINE INCON ASSESS: CPT | Performed by: INTERNAL MEDICINE

## 2017-08-21 PROCEDURE — 99213 OFFICE O/P EST LOW 20 MIN: CPT | Performed by: INTERNAL MEDICINE

## 2017-08-21 PROCEDURE — 0509F URINE INCON PLAN DOCD: CPT | Performed by: INTERNAL MEDICINE

## 2017-08-21 PROCEDURE — 1036F TOBACCO NON-USER: CPT | Performed by: INTERNAL MEDICINE

## 2017-08-21 PROCEDURE — 1123F ACP DISCUSS/DSCN MKR DOCD: CPT | Performed by: INTERNAL MEDICINE

## 2017-08-21 PROCEDURE — G8419 CALC BMI OUT NRM PARAM NOF/U: HCPCS | Performed by: INTERNAL MEDICINE

## 2017-08-21 PROCEDURE — 4040F PNEUMOC VAC/ADMIN/RCVD: CPT | Performed by: INTERNAL MEDICINE

## 2017-08-27 ASSESSMENT — ENCOUNTER SYMPTOMS
ALLERGIC/IMMUNOLOGIC NEGATIVE: 1
EYES NEGATIVE: 1
GASTROINTESTINAL NEGATIVE: 1
RESPIRATORY NEGATIVE: 1

## 2017-09-05 ENCOUNTER — ANTI-COAG VISIT (OUTPATIENT)
Dept: PHARMACY | Facility: CLINIC | Age: 82
End: 2017-09-05

## 2017-09-05 ENCOUNTER — TELEPHONE (OUTPATIENT)
Dept: RHEUMATOLOGY | Age: 82
End: 2017-09-05

## 2017-09-05 DIAGNOSIS — I48.19 PERSISTENT ATRIAL FIBRILLATION (HCC): ICD-10-CM

## 2017-09-05 DIAGNOSIS — M71.9 BURSITIS, UNSPECIFIED SITE: Primary | ICD-10-CM

## 2017-09-05 LAB — INR BLD: 3

## 2017-09-08 ENCOUNTER — CARE COORDINATION (OUTPATIENT)
Dept: CARE COORDINATION | Age: 82
End: 2017-09-08

## 2017-09-19 ENCOUNTER — TELEPHONE (OUTPATIENT)
Dept: INTERNAL MEDICINE CLINIC | Age: 82
End: 2017-09-19

## 2017-09-20 ENCOUNTER — CARE COORDINATION (OUTPATIENT)
Dept: CARE COORDINATION | Age: 82
End: 2017-09-20

## 2017-09-27 ENCOUNTER — CARE COORDINATION (OUTPATIENT)
Dept: INTERNAL MEDICINE CLINIC | Age: 82
End: 2017-09-27

## 2017-10-02 ENCOUNTER — HOSPITAL ENCOUNTER (OUTPATIENT)
Dept: OTHER | Age: 82
Discharge: HOME | End: 2017-10-02
Attending: INTERNAL MEDICINE | Admitting: INTERNAL MEDICINE

## 2017-10-02 ENCOUNTER — ANTI-COAG VISIT (OUTPATIENT)
Dept: PHARMACY | Facility: CLINIC | Age: 82
End: 2017-10-02

## 2017-10-02 DIAGNOSIS — I48.19 PERSISTENT ATRIAL FIBRILLATION (HCC): ICD-10-CM

## 2017-10-02 LAB — INR BLD: 2.5

## 2017-10-02 NOTE — MR AVS SNAPSHOT
After Visit Summary             Hulon Metro   10/2/2017 1:45 PM   Anti-coag visit    Description:  Female : 1922   Provider:  Khanh Dailey, [address] [facility]   Department:  PHYSICIANS [facility]              Your Follow-Up and Future Appointments         Below is a list of your follow-up and future appointments. This may not be a complete list as you may have made appointments directly with providers that we are not aware of or your providers may have made some for you. Please call your providers to confirm appointments. It is important to keep your appointments. Please bring your current insurance card, photo ID, co-pay, and all medication bottles to your appointment. If self-pay, payment is expected at the time of service. Your To-Do List     Future Appointments Provider Department Dept Phone    10/12/2017 1:15 PM Diana Mejias  [address] [phone]    Please arrive 15 minutes prior to appointment, bring photo ID and insurance card. 10/30/2017 1:15 PM [address] [place] [address] [facility] [phone]    2017 1:15 PM Kat Morin MD [facility] Internal Medicine [phone]    Please arrive 15 minutes prior to appointment, bring photo ID and insurance card. Information from Your Visit        Department     Name Address Phone Fax    Jodie  [place] [phone] [facility] [address] [place] [address] [phone]      You Were Seen for:         Comments    Persistent atrial fibrillation [facility])   [[id number]]         Anticoagulation Summary as of 10/2/2017              Today's INR 2.5    Next INR check 10/30/2017      Description           Warfarin 5mg (one tablet) daily  EXCEPT 2.5mg (1/2 tablet) every Tuesday, Thursday, and . Have 1 Ensure every day. Watch spinach intake, avoid if possible.     Call if you stop taking the Tylenol or Norco. Enjoy a serving of greens 2-3 times per week. Vital Signs     Smoking Status                   Former Smoker              Medications and Orders      Your Current Medications Are              trospium (SANCTURA) 20 MG tablet TAKE 1 TABLET BY MOUTH TWICE DAILY    furosemide (LASIX) 20 MG tablet Take 1 tablet daily    metroNIDAZOLE (METROGEL) 0.75 % gel Apply topically every 24 hours Apply topically daily to face. Multiple Vitamins-Minerals (OCUVITE PO) Take by mouth    nystatin (MYCOSTATIN) 037996 UNIT/GM powder Apply 3 times daily. levothyroxine (SYNTHROID) 100 MCG tablet TAKE 1 TABLET DAILY    amiodarone (CORDARONE) 200 MG tablet Take 0.5 tablets by mouth daily    warfarin (COUMADIN) 5 MG tablet Take 5 mg by mouth daily EXCEPT 2.5mg every Sunday and Thursday or as directed by [facility] Coumadin Service [phone]    diazepam (VALIUM) 2 MG tablet Take 1 tablet by mouth nightly as needed for Anxiety or Sleep    pantoprazole (PROTONIX) 40 MG tablet Take 1 tablet by mouth 2 times daily (before meals)    aspirin 81 MG tablet Take 81 mg by mouth daily      Allergies              Zithromax [Azithromycin] Diarrhea    Diarrhea/cramping    Erythromycin Other (See Comments)    Stomach cramps  Nausea and diarrhea    Amoxicillin Hives    Aspirin Other (See Comments)    Other reaction(s): Other (See Comments)  Upset stomach only if non coated  Upset stomach  Full dose causes upset stomach but able to take 81 mg dose      We Ordered/Performed the following           Protime-INR     Comments: This external order was created through the results console.          Result Summary for Protime-INR      Result Information     Status          Final result (Resulted: 10/2/2017  1:54 PM)           10/2/2017  1:54 PM      Component Results     Component Value    INR 2.5               Additional Information        Basic Information     Date Of Birth Sex Race Ethnicity Preferred Language 6/25/1922 Female White Non-/Non  English      Problem List as of 10/2/2017  Date Reviewed: 8/7/2017                S/P AVR    Shortness of breath    Palpitations    Pneumonia     Acute respiratory failure with hypoxia (HCC)    Acute on chronic diastolic CHF (congestive heart failure), NYHA class 1 ([facility] [address]    Pure hypercholesterolemia    Syncope and collapse    Severe aortic stenosis    Paroxysmal atrial fibrillation (HCC)    Atrial fibrillation with RVR (HCC)    Urinary incontinence    Trochanteric bursitis of right hip    Low back pain radiating to right leg    Lumbar stenosis    GERD (gastroesophageal reflux disease)    Anemia, pernicious    Fatigue    Carpal tunnel syndrome    Abnormal gait    Hypothyroidism    Essential hypertension, benign    Sinusitis    Tendinitis      Immunizations as of 10/2/2017     Name Date    Influenza Vaccine, unspecified formulation 10/3/2015    Influenza Virus Vaccine 9/23/2016    Pneumococcal 13-valent Conjugate (Saiotpn40) 5/3/2017    Pneumococcal Conjugate 7-valent 10/29/2008    Pneumococcal Polysaccharide ([id number]) 9/14/2015      Preventive Care        Date Due    Tetanus Combination Vaccine (1 - Tdap) 6/25/1941    Zoster Vaccine 6/25/1982    Yearly Flu Vaccine (1) 9/1/2017            [employer] Signup           Our records indicate that you have an active [employer] account. You can view your After Visit Summary by going to [email or web address] and logging in with your [employer] username and password. If you don't have a [employer] username and password but a parent or guardian has access to your record, the parent or guardian should login with their own [employer] username and password and access your record to view the After Visit Summary. Additional Information  If you have questions, please contact the physician practice where you receive care. Remember, [employer] is NOT to be used for urgent needs. For medical emergencies, dial 911. For questions regarding your [employer] account call [phone]. If you have a clinical question, please call your doctor's office. October 2017 Details    [facility] Tue Wed Thu Fri Sat     1               2      5 mg   See details      3      5 mg         4      5 mg         5      2.5 mg         6      5 mg         7      5 mg           8      2.5 mg         9      5 mg         10      5 mg         11      5 mg         12      2.5 mg         13      5 mg         14      5 mg           15      2.5 mg         16      5 mg         17      5 mg         18      5 mg         19      2.5 mg         20      5 mg         21      5 mg           22      2.5 mg         23      5 mg         24      5 mg         25      5 mg         26      2.5 mg         27      5 mg         28      5 mg           29      2.5 mg         30      5 mg         31                    Date Details   10/02 This INR check       Date of next INR:  10/30/2017         How to take your warfarin dose              To take:  2.5 mg Take 0.5 of a 5 mg tablet. To take:  5 mg Take 1 of the 5 mg tablets.

## 2017-10-12 ENCOUNTER — OFFICE VISIT (OUTPATIENT)
Dept: CARDIOLOGY CLINIC | Age: 82
End: 2017-10-12

## 2017-10-12 VITALS
HEIGHT: 65 IN | BODY MASS INDEX: 24.99 KG/M2 | DIASTOLIC BLOOD PRESSURE: 75 MMHG | HEART RATE: 61 BPM | WEIGHT: 150 LBS | SYSTOLIC BLOOD PRESSURE: 136 MMHG

## 2017-10-12 DIAGNOSIS — I48.0 PAROXYSMAL ATRIAL FIBRILLATION (HCC): ICD-10-CM

## 2017-10-12 DIAGNOSIS — I10 ESSENTIAL HYPERTENSION: ICD-10-CM

## 2017-10-12 DIAGNOSIS — E78.00 HYPERCHOLESTEREMIA: ICD-10-CM

## 2017-10-12 DIAGNOSIS — Z95.2 S/P TAVR (TRANSCATHETER AORTIC VALVE REPLACEMENT): ICD-10-CM

## 2017-10-12 DIAGNOSIS — I35.0 NONRHEUMATIC AORTIC VALVE STENOSIS: Primary | ICD-10-CM

## 2017-10-12 PROCEDURE — 1123F ACP DISCUSS/DSCN MKR DOCD: CPT | Performed by: INTERNAL MEDICINE

## 2017-10-12 PROCEDURE — G8427 DOCREV CUR MEDS BY ELIG CLIN: HCPCS | Performed by: INTERNAL MEDICINE

## 2017-10-12 PROCEDURE — 1036F TOBACCO NON-USER: CPT | Performed by: INTERNAL MEDICINE

## 2017-10-12 PROCEDURE — G8482 FLU IMMUNIZE ORDER/ADMIN: HCPCS | Performed by: INTERNAL MEDICINE

## 2017-10-12 PROCEDURE — 1090F PRES/ABSN URINE INCON ASSESS: CPT | Performed by: INTERNAL MEDICINE

## 2017-10-12 PROCEDURE — 4040F PNEUMOC VAC/ADMIN/RCVD: CPT | Performed by: INTERNAL MEDICINE

## 2017-10-12 PROCEDURE — G8420 CALC BMI NORM PARAMETERS: HCPCS | Performed by: INTERNAL MEDICINE

## 2017-10-12 PROCEDURE — 99214 OFFICE O/P EST MOD 30 MIN: CPT | Performed by: INTERNAL MEDICINE

## 2017-10-12 NOTE — PROGRESS NOTES
Vitamins-Minerals (OCUVITE PO) Take by mouth      nystatin (MYCOSTATIN) 452279 UNIT/GM powder Apply 3 times daily. 30 g 3    levothyroxine (SYNTHROID) 100 MCG tablet TAKE 1 TABLET DAILY 90 tablet 1    amiodarone (CORDARONE) 200 MG tablet Take 0.5 tablets by mouth daily 30 tablet 5    warfarin (COUMADIN) 5 MG tablet Take 5 mg by mouth daily EXCEPT 2.5mg every Sunday and Thursday or as directed by [facility] Coumadin Service [phone]      diazepam (VALIUM) 2 MG tablet Take 1 tablet by mouth nightly as needed for Anxiety or Sleep 5 tablet 2    pantoprazole (PROTONIX) 40 MG tablet Take 1 tablet by mouth 2 times daily (before meals) (Patient taking differently: Take 40 mg by mouth daily ) 180 tablet 3    aspirin 81 MG tablet Take 81 mg by mouth daily       No current facility-administered medications for this visit. Facility-Administered Medications Ordered in Other Visits   Medication Dose Route Frequency Provider Last Rate Last Dose    0.9 % sodium chloride infusion   Intravenous Continuous Kareen Nieves MD   Stopped at 07/03/17 1432    sodium chloride flush 0.9 % injection 10 mL  10 mL Intravenous PRN Kareen Nieves MD   10 mL at 07/03/17 1432     Allergies: Zithromax [azithromycin]; Erythromycin;  Amoxicillin; and Aspirin   ROS:  [x]Full ROS obtained and negative except as mentioned in HPI  Exam:    Vitals:    10/12/17 1322   BP: 136/75   Pulse: 61      Appearance:  Alert, cooperative, no distress, appears stated age   Head:  Normocephalic, without obvious abnormality, atraumatic   Eyes:  PERRL, conjunctiva/corneas clear   Nose: Nares normal, no drainage or sinus tenderness   Throat: Lips, mucosa, and tongue normal   Neck: Supple, symmetrical, trachea midline, no adenopathy, thyroid: not enlarged, symmetric, no tenderness/mass/nodules, no carotid bruit or JVD   Lungs:   Clear to auscultation bilaterally, respirations unlabored   Chest Wall:  No tenderness or deformity   Heart:  Regular rate

## 2017-10-12 NOTE — PATIENT INSTRUCTIONS
Alternate Lasix 20mg and 40mg every other day. Get your bloodwork in a few weeks when you check your blood work for Dr. Concha Rosado

## 2017-10-16 RX ORDER — AMIODARONE HYDROCHLORIDE 200 MG/1
200 TABLET ORAL DAILY
Qty: 30 TABLET | Refills: 0 | Status: SHIPPED | OUTPATIENT
Start: 2017-10-16 | End: 2017-10-16 | Stop reason: SDUPTHER

## 2017-10-17 RX ORDER — AMIODARONE HYDROCHLORIDE 200 MG/1
200 TABLET ORAL DAILY
Qty: 90 TABLET | Refills: 2 | Status: ON HOLD | OUTPATIENT
Start: 2017-10-17 | End: 2017-12-30 | Stop reason: HOSPADM

## 2017-10-17 NOTE — TELEPHONE ENCOUNTER
LF 10/16/17    Requesting 90 day refill    LOV 10/12/17 DCE  LOV 8/7/17 RMM, FU due in 1 year  FU SCHEDULED 3/12/18 with DCE  LABS 8/14/17  EKG 8/7/17

## 2017-10-30 ENCOUNTER — ANTI-COAG VISIT (OUTPATIENT)
Dept: PHARMACY | Facility: CLINIC | Age: 82
End: 2017-10-30

## 2017-10-30 DIAGNOSIS — I48.19 PERSISTENT ATRIAL FIBRILLATION (HCC): ICD-10-CM

## 2017-10-30 DIAGNOSIS — Z95.2 S/P TAVR (TRANSCATHETER AORTIC VALVE REPLACEMENT): ICD-10-CM

## 2017-10-30 LAB
ANION GAP SERPL CALCULATED.3IONS-SCNC: 14 MMOL/L (ref 3–16)
BUN BLDV-MCNC: 13 MG/DL (ref 7–20)
CALCIUM SERPL-MCNC: 9.2 MG/DL (ref 8.3–10.6)
CHLORIDE BLD-SCNC: 97 MMOL/L (ref 99–110)
CO2: 30 MMOL/L (ref 21–32)
CREAT SERPL-MCNC: 0.8 MG/DL (ref 0.6–1.2)
GFR AFRICAN AMERICAN: >60
GFR NON-AFRICAN AMERICAN: >60
GLUCOSE BLD-MCNC: 93 MG/DL (ref 70–99)
INR BLD: 3.3
POTASSIUM SERPL-SCNC: 4.3 MMOL/L (ref 3.5–5.1)
SODIUM BLD-SCNC: 141 MMOL/L (ref 136–145)

## 2017-10-30 NOTE — PROGRESS NOTES
Ms. Brittni Marie is a 80 y.o.  female with history of Afib who presents today for anticoagulation monitoring and adjustment. Patient verifies current dosing regimen. Patient denies s/s bleeding/bruising/swelling/SOB. No blood in urine or stool. No dietary changes. No changes in medication/OTC agents/Herbals. No change in alcohol use. No missed doses. No Procedures scheduled in the future at this time. Lab Results   Component Value Date    INR 3.3 10/30/2017    INR 2.5 10/02/2017    INR 3 09/05/2017           Coumadin dose: Hold today  Warfarin 5mg (one tablet) daily  EXCEPT 2.5mg (1/2 tablet) every Tuesday, Thursday, and Sunday. .  Recheck INR in 4 weeks. Patient reminded to call the Anticoagulation Clinic with any signs or symptoms of bleeding or with any medication changes. Patient given instructions utilizing the teach back method. After visit summary printed and reviewed with patient.       Medications reviewed and updated on home medication list Yes  Warfarin dose updated on patient home medication list  Yes    Influenza vaccine:   [] given    [] declined   [x] received previously   [] plans to receive at a later time   [] refused    [x] documented in EPIC

## 2017-11-01 ENCOUNTER — HOSPITAL ENCOUNTER (OUTPATIENT)
Dept: OTHER | Age: 82
Discharge: HOME | End: 2017-11-30
Attending: INTERNAL MEDICINE | Admitting: INTERNAL MEDICINE

## 2017-11-06 ENCOUNTER — TELEPHONE (OUTPATIENT)
Dept: CARDIOLOGY CLINIC | Age: 82
End: 2017-11-06

## 2017-11-06 ENCOUNTER — TELEPHONE (OUTPATIENT)
Dept: PHARMACY | Facility: CLINIC | Age: 82
End: 2017-11-06

## 2017-11-06 RX ORDER — FUROSEMIDE 20 MG/1
TABLET ORAL
Qty: 90 TABLET | Refills: 3
Start: 2017-11-06 | End: 2019-05-20

## 2017-11-06 NOTE — TELEPHONE ENCOUNTER
Reviewed and it appears that maintenance plan was not updated- advised patient's daughter that she should be getting 2.5 mg on Tues, Thurs and Sunday.

## 2017-11-27 ENCOUNTER — ANTI-COAG VISIT (OUTPATIENT)
Dept: PHARMACY | Facility: CLINIC | Age: 82
End: 2017-11-27

## 2017-11-27 DIAGNOSIS — I48.19 PERSISTENT ATRIAL FIBRILLATION (HCC): ICD-10-CM

## 2017-11-27 LAB — INR BLD: 2.3

## 2017-11-27 NOTE — PROGRESS NOTES
Ms. Rachel Mccarty is a 80 y.o.  female with history of Afib who presents today for anticoagulation monitoring and adjustment. Patient verifies current dosing regimen. Patient denies s/s bleeding/bruising/swelling/SOB. No blood in urine or stool. No dietary changes. No changes in medication/OTC agents/Herbals. No change in alcohol use. No missed doses. No Procedures scheduled in the future at this time. Lab Results   Component Value Date    INR 2.3 11/27/2017    INR 3.3 10/30/2017    INR 2.5 10/02/2017       Patient doing well. No problems or changes. Realized after last visit that the calendar was never updated, so patient was still receiving 5 mg on Tuesday instead of 2.5 mg -likely cause of INR being 3.3  Daughter has since called to clarify and INR is now in range. Updated anticoag track and home med list    Coumadin dose: continue current dose. Recheck INR in 4 weeks. Patient reminded to call the Anticoagulation Clinic with any signs or symptoms of bleeding or with any medication changes. Patient given instructions utilizing the teach back method. After visit summary printed and reviewed with patient.       Medications reviewed and updated on home medication list Yes  Warfarin dose updated on patient home medication list  Yes    Influenza vaccine:   [] given    [x] declined   [x] received previously   [] plans to receive at a later time   [] refused    [] documented in EPIC

## 2017-12-01 ENCOUNTER — HOSPITAL ENCOUNTER (OUTPATIENT)
Dept: OTHER | Age: 82
Discharge: HOME | End: 2017-12-31
Attending: INTERNAL MEDICINE | Admitting: INTERNAL MEDICINE

## 2017-12-18 RX ORDER — FUROSEMIDE 40 MG/1
40 TABLET ORAL DAILY
Qty: 90 TABLET | Refills: 0 | Status: SHIPPED | OUTPATIENT
Start: 2017-12-18 | End: 2018-05-29 | Stop reason: SDUPTHER

## 2017-12-26 ENCOUNTER — ANTI-COAG VISIT (OUTPATIENT)
Dept: PHARMACY | Facility: CLINIC | Age: 82
End: 2017-12-26

## 2017-12-26 DIAGNOSIS — I48.19 PERSISTENT ATRIAL FIBRILLATION (HCC): ICD-10-CM

## 2017-12-26 LAB — INTERNATIONAL NORMALIZATION RATIO, POC: 2.4

## 2017-12-28 PROBLEM — J11.1 INFLUENZA: Status: ACTIVE | Noted: 2017-12-28

## 2017-12-29 PROBLEM — R53.1 GENERALIZED WEAKNESS: Status: ACTIVE | Noted: 2017-12-29

## 2017-12-29 PROBLEM — I50.32 CHRONIC DIASTOLIC CHF (CONGESTIVE HEART FAILURE) (HCC): Status: ACTIVE | Noted: 2017-12-29

## 2018-01-01 ENCOUNTER — HOSPITAL ENCOUNTER (OUTPATIENT)
Dept: OTHER | Age: 83
Discharge: HOME | End: 2018-01-31
Attending: INTERNAL MEDICINE | Admitting: INTERNAL MEDICINE

## 2018-01-02 ENCOUNTER — TELEPHONE (OUTPATIENT)
Dept: CARDIOLOGY CLINIC | Age: 83
End: 2018-01-02

## 2018-01-02 NOTE — TELEPHONE ENCOUNTER
Pts daughter, Kayley Ortega, called stating that her mother was  released yesterday from [facility] with the flu. States that she is doing better but had low BP readings this morning, 63/37, 79/46, 92/44. States that she was asymptomatic. After walking her BP was 156/72. PO 95%. States that she was afebrile at the time of the low BP readings. States that she is eating well and staying hydrated. Asking if she should give her Lasix. Reviewed her recent labs, weight, VSS and hospital stay. Discussed with Dr. Tyra Garrett. Instructed her to hold her Lasix for another day. OK to give tomorrow for stable BP but to hold if BP low again. Instructed Ester to call me in the next day or two with her weights and BP readings. Verbalized understanding.  Kelly HARDY

## 2018-01-22 ENCOUNTER — ANTI-COAG VISIT (OUTPATIENT)
Dept: PHARMACY | Facility: CLINIC | Age: 83
End: 2018-01-22

## 2018-01-22 DIAGNOSIS — I48.19 PERSISTENT ATRIAL FIBRILLATION (HCC): ICD-10-CM

## 2018-01-22 LAB — INTERNATIONAL NORMALIZATION RATIO, POC: 3.3

## 2018-01-22 NOTE — PROGRESS NOTES
Ms. Montez Bradshaw is a 80 y.o.  female with history of Afib who presents today for anticoagulation monitoring and adjustment. Patient verifies current dosing regimen  Patient denies s/s bleeding/bruising/swelling/SOB  No blood in urine or stool. No changes in medication/OTC agents/Herbals. No change in alcohol use. No missed doses. No Procedures scheduled in the future at this time. Lab Results   Component Value Date    INR 3.3 01/22/2018    INR 2.40 (H) 01/01/2018    INR 2.35 (H) 12/31/2017       Patient described less greens served at [facility] since last visit. Will drink ensure if it happens again. Hold today then resume  Warfarin 5mg (one tablet) daily  EXCEPT 2.5mg (1/2 tablet) every Tuesday, Thursday, and Sunday. See in 4 weeks  After visit summary printed and reviewed with patient.       Medications reviewed and updated on home medication list: Yes  Warfarin dose updated on patient home medication list:yes    Influenza vaccine:     [] given    [] declined   [] received previously   [] plans to receive at a later time   [] refused    [] documented in EPIC

## 2018-02-01 ENCOUNTER — HOSPITAL ENCOUNTER (OUTPATIENT)
Dept: OTHER | Age: 83
Discharge: HOME | End: 2018-02-28
Attending: INTERNAL MEDICINE | Admitting: INTERNAL MEDICINE

## 2018-02-19 ENCOUNTER — ANTI-COAG VISIT (OUTPATIENT)
Dept: PHARMACY | Facility: CLINIC | Age: 83
End: 2018-02-19

## 2018-02-19 DIAGNOSIS — I48.19 PERSISTENT ATRIAL FIBRILLATION (HCC): ICD-10-CM

## 2018-02-19 LAB — INR BLD: 3.4

## 2018-03-01 ENCOUNTER — HOSPITAL ENCOUNTER (OUTPATIENT)
Dept: OTHER | Age: 83
Discharge: HOME | End: 2018-03-31
Attending: INTERNAL MEDICINE | Admitting: INTERNAL MEDICINE

## 2018-03-12 ENCOUNTER — OFFICE VISIT (OUTPATIENT)
Dept: CARDIOLOGY CLINIC | Age: 83
End: 2018-03-12

## 2018-03-12 VITALS
HEART RATE: 60 BPM | DIASTOLIC BLOOD PRESSURE: 60 MMHG | HEIGHT: 65 IN | SYSTOLIC BLOOD PRESSURE: 130 MMHG | WEIGHT: 143 LBS | BODY MASS INDEX: 23.82 KG/M2

## 2018-03-12 DIAGNOSIS — Z95.2 S/P TAVR (TRANSCATHETER AORTIC VALVE REPLACEMENT): ICD-10-CM

## 2018-03-12 DIAGNOSIS — I35.0 NONRHEUMATIC AORTIC VALVE STENOSIS: Primary | ICD-10-CM

## 2018-03-12 DIAGNOSIS — E78.00 HYPERCHOLESTEREMIA: ICD-10-CM

## 2018-03-12 DIAGNOSIS — I10 ESSENTIAL HYPERTENSION: ICD-10-CM

## 2018-03-12 PROCEDURE — 99214 OFFICE O/P EST MOD 30 MIN: CPT | Performed by: INTERNAL MEDICINE

## 2018-03-12 PROCEDURE — 1036F TOBACCO NON-USER: CPT | Performed by: INTERNAL MEDICINE

## 2018-03-12 PROCEDURE — G8420 CALC BMI NORM PARAMETERS: HCPCS | Performed by: INTERNAL MEDICINE

## 2018-03-12 PROCEDURE — 1090F PRES/ABSN URINE INCON ASSESS: CPT | Performed by: INTERNAL MEDICINE

## 2018-03-12 PROCEDURE — 1123F ACP DISCUSS/DSCN MKR DOCD: CPT | Performed by: INTERNAL MEDICINE

## 2018-03-12 PROCEDURE — 4040F PNEUMOC VAC/ADMIN/RCVD: CPT | Performed by: INTERNAL MEDICINE

## 2018-03-12 PROCEDURE — G8427 DOCREV CUR MEDS BY ELIG CLIN: HCPCS | Performed by: INTERNAL MEDICINE

## 2018-03-12 PROCEDURE — G8482 FLU IMMUNIZE ORDER/ADMIN: HCPCS | Performed by: INTERNAL MEDICINE

## 2018-03-12 RX ORDER — AMIODARONE HYDROCHLORIDE 200 MG/1
100 TABLET ORAL DAILY
Qty: 30 TABLET | Refills: 5 | Status: SHIPPED | OUTPATIENT
Start: 2018-03-12 | End: 2019-05-02 | Stop reason: DRUGHIGH

## 2018-03-12 RX ORDER — WARFARIN SODIUM 5 MG/1
5 TABLET ORAL DAILY
Qty: 90 TABLET | Refills: 3 | Status: ON HOLD | OUTPATIENT
Start: 2018-03-12 | End: 2018-11-17 | Stop reason: HOSPADM

## 2018-03-12 RX ORDER — POTASSIUM CHLORIDE 20 MEQ/1
20 TABLET, EXTENDED RELEASE ORAL DAILY
Qty: 90 TABLET | Refills: 3 | Status: ON HOLD | OUTPATIENT
Start: 2018-03-12 | End: 2019-05-25 | Stop reason: HOSPADM

## 2018-03-12 NOTE — COMMUNICATION BODY
[address]       H+P // CONSULT // OUTPATIENT VISIT // Crescencio Noel     Referring Doctor Ezequiel Diaz MD   Encounter Type Followup     CHIEF COMPLAINT     VisitType [] Acute [x] Chronic     Symptom [x] None [] CP [] SOB [] Dizzy [] Palps [] Fatigue     Problems AS s/p TAVR 7/16, HTN, CHOL, pAFIB      HISTORY OF PRESENT ILLNESS     Doing well. Denies cp. Compliant with meds. Mild sob occasionally with exercise. SOB slightly more noticeable over last few months. Very sedentary. Symptom Y N Frequency Duration Severity Modifying Assoc Sx Other   CP [] [x]         SOB [x] [] occasion mins mild +stress -rest     Dizzy [] [x]         Syncope [] [x]         Palpitations [] [x]           COMPLIANCE     Category Meds Diet Salt Exercise Tobacco Alcohol Drugs   Compliant [x] [x] [x] [] [x] [x] [x]   [x]Counseling given on all above above categories    HISTORY/ALLERGIES/ROS     MedHx:  has a past medical history of Anemia, pernicious; Arthritis; Cataract; High blood pressure; Hyperlipidemia; Hypothyroidism; Macular degeneration; Melanoma ([facility] [address] Nocturia; Urinary incontinence; and Vertigo. SurgHx:  has a past surgical history that includes joint replacement (2008); Inguinal hernia repair (2007); Cataract removal (2010); malignant skin lesion excision (2010); Endoscopy, colon, diagnostic (5/23/16); percutaneous balloon valvuloplasty (5/20/2016); and Cardioversion (01/2017). SocHx:   reports that she quit smoking about 68 years ago. She has never used smokeless tobacco. She reports that she does not drink alcohol or use drugs. FamHx: family history includes Cancer in her mother; Heart Disease in her father. Allergies: Zithromax [azithromycin]; Erythromycin;  Amoxicillin; and Aspirin   ROS:  [x]Full ROS obtained and negative except as mentioned in HPI     MEDICATIONS      Current Outpatient Prescriptions   Medication Sig Dispense Refill    furosemide (LASIX) 40 MG tablet TAKE 1 TABLET BY MOUTH DAILY 90 tablet 0    furosemide (LASIX) 20 MG tablet Take 20mg alternating with 40mg QOD. 90 tablet 3    levothyroxine (SYNTHROID) 100 MCG tablet TAKE 1 TABLET BY MOUTH DAILY 90 tablet 1    trospium (SANCTURA) 20 MG tablet TAKE 1 TABLET BY MOUTH TWICE DAILY 180 tablet 0    metroNIDAZOLE (METROGEL) 0.75 % gel Apply topically every 24 hours Apply topically daily to face.  Multiple Vitamins-Minerals (OCUVITE PO) Take by mouth      nystatin (MYCOSTATIN) 938969 UNIT/GM powder Apply 3 times daily. 30 g 3    amiodarone (CORDARONE) 200 MG tablet Take 0.5 tablets by mouth daily 30 tablet 5    warfarin (COUMADIN) 5 MG tablet Take 5 mg by mouth daily EXCEPT 2.5mg every Sunday, Tuesday and Thursday or as directed by [facility] Coumadin Service [phone]      diazepam (VALIUM) 2 MG tablet Take 1 tablet by mouth nightly as needed for Anxiety or Sleep 5 tablet 2    pantoprazole (PROTONIX) 40 MG tablet Take 1 tablet by mouth 2 times daily (before meals) (Patient taking differently: Take 40 mg by mouth daily ) 180 tablet 3    aspirin 81 MG tablet Take 81 mg by mouth daily       No current facility-administered medications for this visit.       Facility-Administered Medications Ordered in Other Visits   Medication Dose Route Frequency Provider Last Rate Last Dose    0.9 % sodium chloride infusion   Intravenous Continuous Lawyer Severance, MD   Stopped at 07/03/17 1432    sodium chloride flush 0.9 % injection 10 mL  10 mL Intravenous PRN Lawyer Severance, MD   10 mL at 07/03/17 1432     Reviewed with patient and will remain unchanged except as mentioned in A/P  PHYSICAL EXAM     Vitals:    03/12/18 1317   BP: 130/60   Pulse: 60      Gen Alert, coop, no distress Heart  RRR, no MRG, nl apic impulse   Head NC, AT, no abnorm Abd  Soft, NT, +BS, no mass, no OM   Eyes PERRLA, conj/corn clear Ext  Ext nl, AT, no C/C/E   Nose Nares nl, no drain, NT Pulse 2+ and symmetric   Throat Lips, mucosa, tongue nl Skin

## 2018-03-12 NOTE — PATIENT INSTRUCTIONS
Take 20 meq of Potassium when taking 40 mg of lasix. Continue all medications. Schedule pulmonary function test. Schedule echo prior to next appointment.

## 2018-03-12 NOTE — LETTER
percutaneous balloon valvuloplasty (5/20/2016); and Cardioversion (01/2017). SocHx:   reports that she quit smoking about 68 years ago. She has never used smokeless tobacco. She reports that she does not drink alcohol or use drugs. FamHx: family history includes Cancer in her mother; Heart Disease in her father. Allergies: Zithromax [azithromycin]; Erythromycin; Amoxicillin; and Aspirin   ROS:  [x]Full ROS obtained and negative except as mentioned in HPI     MEDICATIONS      Current Outpatient Prescriptions   Medication Sig Dispense Refill    furosemide (LASIX) 40 MG tablet TAKE 1 TABLET BY MOUTH DAILY 90 tablet 0    furosemide (LASIX) 20 MG tablet Take 20mg alternating with 40mg QOD. 90 tablet 3    levothyroxine (SYNTHROID) 100 MCG tablet TAKE 1 TABLET BY MOUTH DAILY 90 tablet 1    trospium (SANCTURA) 20 MG tablet TAKE 1 TABLET BY MOUTH TWICE DAILY 180 tablet 0    metroNIDAZOLE (METROGEL) 0.75 % gel Apply topically every 24 hours Apply topically daily to face.  Multiple Vitamins-Minerals (OCUVITE PO) Take by mouth      nystatin (MYCOSTATIN) 553678 UNIT/GM powder Apply 3 times daily. 30 g 3    amiodarone (CORDARONE) 200 MG tablet Take 0.5 tablets by mouth daily 30 tablet 5    warfarin (COUMADIN) 5 MG tablet Take 5 mg by mouth daily EXCEPT 2.5mg every Sunday, Tuesday and Thursday or as directed by [facility] Coumadin Service [phone]      diazepam (VALIUM) 2 MG tablet Take 1 tablet by mouth nightly as needed for Anxiety or Sleep 5 tablet 2    pantoprazole (PROTONIX) 40 MG tablet Take 1 tablet by mouth 2 times daily (before meals) (Patient taking differently: Take 40 mg by mouth daily ) 180 tablet 3    aspirin 81 MG tablet Take 81 mg by mouth daily       No current facility-administered medications for this visit.       Facility-Administered Medications Ordered in Other Visits   Medication Dose Route Frequency Provider Last Rate Last Dose Mel Jo MD

## 2018-03-19 ENCOUNTER — ANTI-COAG VISIT (OUTPATIENT)
Dept: PHARMACY | Facility: CLINIC | Age: 83
End: 2018-03-19

## 2018-03-19 DIAGNOSIS — I48.19 PERSISTENT ATRIAL FIBRILLATION (HCC): ICD-10-CM

## 2018-03-19 LAB — INTERNATIONAL NORMALIZATION RATIO, POC: 2.4

## 2018-03-19 NOTE — PROGRESS NOTES
Ms. Parrish Rodas is a 80 y.o.  female with history of Afib who presents today for anticoagulation monitoring and adjustment. Patient verifies current dosing regimen  Patient denies s/s bleeding/bruising/swelling/SOB  No blood in urine or stool. No dietary changes. No changes in medication/OTC agents/Herbals. No change in alcohol use. No missed doses. No Procedures scheduled in the future at this time. Lab Results   Component Value Date    INR 2.4 03/19/2018    INR 3.40 02/19/2018    INR 3.3 01/22/2018       Continue Warfarin 5mg (one tablet) daily  EXCEPT 2.5mg (1/2 tablet) every Tuesday, Thursday, and Sunday. See in 4 weeks    After visit summary printed and reviewed with patient.       Medications reviewed and updated on home medication list: Yes  Warfarin dose updated on patient home medication list: Yes

## 2018-04-01 ENCOUNTER — HOSPITAL ENCOUNTER (OUTPATIENT)
Dept: OTHER | Age: 83
Discharge: HOME | End: 2018-04-30
Attending: INTERNAL MEDICINE | Admitting: INTERNAL MEDICINE

## 2018-04-16 ENCOUNTER — ANTI-COAG VISIT (OUTPATIENT)
Dept: PHARMACY | Facility: CLINIC | Age: 83
End: 2018-04-16

## 2018-04-16 ENCOUNTER — HOSPITAL ENCOUNTER (OUTPATIENT)
Dept: PULMONOLOGY | Age: 83
Discharge: HOME | End: 2018-04-16
Attending: INTERNAL MEDICINE | Admitting: INTERNAL MEDICINE

## 2018-04-16 DIAGNOSIS — I48.19 PERSISTENT ATRIAL FIBRILLATION (HCC): ICD-10-CM

## 2018-04-16 DIAGNOSIS — I35.0 NONRHEUMATIC AORTIC VALVE STENOSIS: ICD-10-CM

## 2018-04-16 LAB — INTERNATIONAL NORMALIZATION RATIO, POC: 2.3

## 2018-04-16 PROCEDURE — 94060 EVALUATION OF WHEEZING: CPT | Performed by: INTERNAL MEDICINE

## 2018-04-16 PROCEDURE — 94727 GAS DIL/WSHOT DETER LNG VOL: CPT | Performed by: INTERNAL MEDICINE

## 2018-04-16 PROCEDURE — 94729 DIFFUSING CAPACITY: CPT | Performed by: INTERNAL MEDICINE

## 2018-04-16 RX ORDER — ALBUTEROL SULFATE 90 UG/1
4 AEROSOL, METERED RESPIRATORY (INHALATION) ONCE
Status: COMPLETED | OUTPATIENT
Start: 2018-04-16 | End: 2018-04-16

## 2018-04-16 RX ADMIN — ALBUTEROL SULFATE 4 PUFF: 90 AEROSOL, METERED RESPIRATORY (INHALATION) at 13:50

## 2018-04-30 ENCOUNTER — TELEPHONE (OUTPATIENT)
Dept: CARDIOLOGY CLINIC | Age: 83
End: 2018-04-30

## 2018-05-01 ENCOUNTER — HOSPITAL ENCOUNTER (OUTPATIENT)
Dept: OTHER | Age: 83
Discharge: HOME | End: 2018-05-31
Attending: INTERNAL MEDICINE | Admitting: INTERNAL MEDICINE

## 2018-05-14 ENCOUNTER — ANTI-COAG VISIT (OUTPATIENT)
Dept: PHARMACY | Facility: CLINIC | Age: 83
End: 2018-05-14

## 2018-05-14 ENCOUNTER — OFFICE VISIT (OUTPATIENT)
Dept: ORTHOPEDIC SURGERY | Age: 83
End: 2018-05-14

## 2018-05-14 VITALS
SYSTOLIC BLOOD PRESSURE: 155 MMHG | BODY MASS INDEX: 23.82 KG/M2 | HEIGHT: 65 IN | HEART RATE: 60 BPM | TEMPERATURE: 99.2 F | WEIGHT: 143 LBS | DIASTOLIC BLOOD PRESSURE: 69 MMHG

## 2018-05-14 DIAGNOSIS — I48.19 PERSISTENT ATRIAL FIBRILLATION (HCC): ICD-10-CM

## 2018-05-14 DIAGNOSIS — M17.11 PRIMARY OSTEOARTHRITIS OF RIGHT KNEE: ICD-10-CM

## 2018-05-14 DIAGNOSIS — M25.561 RIGHT KNEE PAIN, UNSPECIFIED CHRONICITY: Primary | ICD-10-CM

## 2018-05-14 LAB — INTERNATIONAL NORMALIZATION RATIO, POC: 2.4

## 2018-05-14 PROCEDURE — 1090F PRES/ABSN URINE INCON ASSESS: CPT | Performed by: PHYSICIAN ASSISTANT

## 2018-05-14 PROCEDURE — 4040F PNEUMOC VAC/ADMIN/RCVD: CPT | Performed by: PHYSICIAN ASSISTANT

## 2018-05-14 PROCEDURE — 20610 DRAIN/INJ JOINT/BURSA W/O US: CPT | Performed by: PHYSICIAN ASSISTANT

## 2018-05-14 PROCEDURE — G8420 CALC BMI NORM PARAMETERS: HCPCS | Performed by: PHYSICIAN ASSISTANT

## 2018-05-14 PROCEDURE — G8427 DOCREV CUR MEDS BY ELIG CLIN: HCPCS | Performed by: PHYSICIAN ASSISTANT

## 2018-05-14 PROCEDURE — 1123F ACP DISCUSS/DSCN MKR DOCD: CPT | Performed by: PHYSICIAN ASSISTANT

## 2018-05-14 PROCEDURE — 1036F TOBACCO NON-USER: CPT | Performed by: PHYSICIAN ASSISTANT

## 2018-05-14 PROCEDURE — 99202 OFFICE O/P NEW SF 15 MIN: CPT | Performed by: PHYSICIAN ASSISTANT

## 2018-05-15 PROBLEM — M17.11 PRIMARY OSTEOARTHRITIS OF RIGHT KNEE: Status: ACTIVE | Noted: 2018-05-15

## 2018-05-30 RX ORDER — FUROSEMIDE 40 MG/1
40 TABLET ORAL DAILY
Qty: 90 TABLET | Refills: 0 | Status: SHIPPED | OUTPATIENT
Start: 2018-05-30 | End: 2018-11-05 | Stop reason: SDUPTHER

## 2018-06-01 ENCOUNTER — HOSPITAL ENCOUNTER (OUTPATIENT)
Dept: OTHER | Age: 83
Discharge: HOME | End: 2018-06-30
Attending: INTERNAL MEDICINE | Admitting: INTERNAL MEDICINE

## 2018-06-11 ENCOUNTER — ANTI-COAG VISIT (OUTPATIENT)
Dept: PHARMACY | Facility: CLINIC | Age: 83
End: 2018-06-11

## 2018-06-11 DIAGNOSIS — I48.19 PERSISTENT ATRIAL FIBRILLATION (HCC): ICD-10-CM

## 2018-06-11 LAB — INTERNATIONAL NORMALIZATION RATIO, POC: 2

## 2018-07-01 ENCOUNTER — HOSPITAL ENCOUNTER (OUTPATIENT)
Dept: OTHER | Age: 83
Discharge: HOME | End: 2018-07-31
Attending: INTERNAL MEDICINE | Admitting: INTERNAL MEDICINE

## 2018-07-09 ENCOUNTER — ANTI-COAG VISIT (OUTPATIENT)
Dept: PHARMACY | Facility: CLINIC | Age: 83
End: 2018-07-09

## 2018-07-09 DIAGNOSIS — I48.19 PERSISTENT ATRIAL FIBRILLATION (HCC): ICD-10-CM

## 2018-07-09 LAB — INTERNATIONAL NORMALIZATION RATIO, POC: 3.4

## 2018-07-09 NOTE — PROGRESS NOTES
Ms. Roslyn Schmitt is a 80 y.o.  female with history of Afib who presents today for anticoagulation monitoring and adjustment. Patient verifies current dosing regimen. Lab Results   Component Value Date    INR 3.4 07/09/2018    INR 2 06/11/2018    INR 2.30 (H) 05/31/2018     Patient doing well. Daughter reports she has been served fewer vegetables at facility she lives at and likely is not drinking enough Ensure to make up for the missed vitamin K. INR just a little high today because of this. INR was elevated at 3.4 earlier this year, and dose was held for one day and followed up in 4 weeks. INR has been pretty stable the past 4 months, so will do the same thing again before making any changes to her regimen. Coumadin dose: hold x 1 day then continue same dose. Recheck INR in 4 weeks. Patient reminded to call the Anticoagulation Clinic with any signs or symptoms of bleeding or with any medication changes. Patient given instructions utilizing the teach back method. After visit summary printed and reviewed with patient.     Medications reviewed and Warfarin dose updated

## 2018-08-01 ENCOUNTER — HOSPITAL ENCOUNTER (OUTPATIENT)
Dept: OTHER | Age: 83
Discharge: HOME | End: 2018-08-31
Attending: INTERNAL MEDICINE | Admitting: INTERNAL MEDICINE

## 2018-08-06 ENCOUNTER — ANTI-COAG VISIT (OUTPATIENT)
Dept: PHARMACY | Facility: CLINIC | Age: 83
End: 2018-08-06

## 2018-08-06 DIAGNOSIS — I48.19 PERSISTENT ATRIAL FIBRILLATION (HCC): ICD-10-CM

## 2018-08-06 LAB — INTERNATIONAL NORMALIZATION RATIO, POC: 3.4

## 2018-08-06 NOTE — PROGRESS NOTES
Ms. Emmy Anderson is a 80 y.o.  female with history of Afib who presents today for anticoagulation monitoring and adjustment. Patient verifies current dosing regimen  Patient denies s/s bleeding/bruising/swelling/SOB  No blood in urine or stool. No dietary changes. No changes in medication/OTC agents/Herbals. No change in alcohol use. No missed doses. No Procedures scheduled in the future at this time. Lab Results   Component Value Date    INR 3.4 08/06/2018    INR 3.4 07/09/2018    INR 2 06/11/2018       Pertinent findings: none    Warfarin dosing: HOLD x 1 dose today  Continue Warfarin 5mg (one tablet) daily  EXCEPT 2.5mg (1/2 tablet) every Tuesday, Thursday, and Sunday. See in 3 weeks    After visit summary printed and reviewed with patient.       Medications reviewed and updated on home medication list: Yes  Warfarin dose updated on patient home medication list: Yes

## 2018-08-13 ENCOUNTER — OFFICE VISIT (OUTPATIENT)
Dept: ORTHOPEDIC SURGERY | Age: 83
End: 2018-08-13

## 2018-08-13 VITALS
DIASTOLIC BLOOD PRESSURE: 68 MMHG | HEIGHT: 65 IN | SYSTOLIC BLOOD PRESSURE: 158 MMHG | BODY MASS INDEX: 23.32 KG/M2 | WEIGHT: 140 LBS | HEART RATE: 60 BPM

## 2018-08-13 DIAGNOSIS — M17.11 PRIMARY OSTEOARTHRITIS OF RIGHT KNEE: Primary | ICD-10-CM

## 2018-08-13 DIAGNOSIS — Z96.652 HISTORY OF ARTHROPLASTY OF LEFT KNEE: ICD-10-CM

## 2018-08-13 PROCEDURE — 99212 OFFICE O/P EST SF 10 MIN: CPT | Performed by: PHYSICIAN ASSISTANT

## 2018-08-13 PROCEDURE — 1090F PRES/ABSN URINE INCON ASSESS: CPT | Performed by: PHYSICIAN ASSISTANT

## 2018-08-13 PROCEDURE — G8427 DOCREV CUR MEDS BY ELIG CLIN: HCPCS | Performed by: PHYSICIAN ASSISTANT

## 2018-08-13 PROCEDURE — 1123F ACP DISCUSS/DSCN MKR DOCD: CPT | Performed by: PHYSICIAN ASSISTANT

## 2018-08-13 PROCEDURE — 20610 DRAIN/INJ JOINT/BURSA W/O US: CPT | Performed by: PHYSICIAN ASSISTANT

## 2018-08-13 PROCEDURE — 4040F PNEUMOC VAC/ADMIN/RCVD: CPT | Performed by: PHYSICIAN ASSISTANT

## 2018-08-13 PROCEDURE — 1101F PT FALLS ASSESS-DOCD LE1/YR: CPT | Performed by: PHYSICIAN ASSISTANT

## 2018-08-13 PROCEDURE — G8420 CALC BMI NORM PARAMETERS: HCPCS | Performed by: PHYSICIAN ASSISTANT

## 2018-08-13 PROCEDURE — 1036F TOBACCO NON-USER: CPT | Performed by: PHYSICIAN ASSISTANT

## 2018-08-14 PROBLEM — Z96.652 HISTORY OF ARTHROPLASTY OF LEFT KNEE: Status: ACTIVE | Noted: 2018-08-14

## 2018-08-14 NOTE — PROGRESS NOTES
Post op Diagnosis: Same  With the patient's permission, her right knee was prepped  in standard sterile fashion with  Alcohol and 2 cc of 0.25% Marcaine and 1 cc of Kenalog 40 mg was injected into the right lateral compartment  without difficulty. The patient tolerated this well without difficulty. A band-aid was applied. The patient was advised to ice the knee for 15-20 minutes to relieve any injection site related pain. Follow Up: 21/2 to 3 months. Call or return to clinic prn if these symptoms worsen or fail to improve as anticipated.

## 2018-08-17 ENCOUNTER — TELEPHONE (OUTPATIENT)
Dept: CARDIOLOGY CLINIC | Age: 83
End: 2018-08-17

## 2018-08-17 NOTE — TELEPHONE ENCOUNTER
Patient's daughter is asking if patient needs to see Rhonda Rodriguez and  or can they just see Irene Webb.

## 2018-08-27 ENCOUNTER — ANTI-COAG VISIT (OUTPATIENT)
Dept: PHARMACY | Facility: CLINIC | Age: 83
End: 2018-08-27

## 2018-08-27 DIAGNOSIS — I48.19 PERSISTENT ATRIAL FIBRILLATION (HCC): ICD-10-CM

## 2018-08-27 LAB — INR BLD: 2.9

## 2018-09-01 ENCOUNTER — HOSPITAL ENCOUNTER (OUTPATIENT)
Dept: OTHER | Age: 83
Discharge: HOME | End: 2018-09-01
Attending: INTERNAL MEDICINE | Admitting: INTERNAL MEDICINE

## 2018-09-14 NOTE — PROGRESS NOTES
23mm   NYHA   []I         [x]II           []III          []IV   TTE 7/16 7/17 8/19 60%  60%  60% TAVR MG 9, mod MR and TR  TAVR MG 13, severe MR  TAVR MG 12, mod TR, mod MR   Plan   Yearly echos  Continue current meds   ~HTN  Today BP [x] Controlled [] Borderline [] Uncontrolled   Counseling [x] Diet/Salt [x] Exercise [x] Weight    Plan Continue current medications at doses detailed above  ~Hyperchol  Goal LDL [] <100 [x] <70     Counseling [x] Diet [x] Weight [x] Exercise   Lipid/liver Monitor [x] PCP [] Cardio [] Endocrine   Plan Continue current doses of meds listed above  12/15 HDL:82, LDL:49  ~Afib   Date Detail   Type  paroxysmal   R/R  Regular, controlled   R/RM  Reviewed   CVS  >1   AC/AP  coumadin   Plan  Continue current medical regimen at doses noted above   ~Followup  Interval: 6 months  Tests/Labs: None    Scribe Attestation  IManuel, am scribing for and in the presence of Edgard Wilson MD.   SignedManuel 09/14/18 12:34 PM   Provider Jaimie Pike is working as a scribe for and in the presence of me (Edgard Wilson MD). Working as a scribe, Manuel Dunn may have prepopulated components of this note with my historical  intellectual property under my direct supervision. Any additions to this intellectual property were performed in my presence and at my direction. Furthermore, the content and accuracy of this note have been reviewed by me Edgard Wilson MD).

## 2018-09-17 ENCOUNTER — OFFICE VISIT (OUTPATIENT)
Dept: CARDIOLOGY CLINIC | Age: 83
End: 2018-09-17

## 2018-09-17 ENCOUNTER — HOSPITAL ENCOUNTER (OUTPATIENT)
Dept: NON INVASIVE DIAGNOSTICS | Age: 83
Discharge: HOME | End: 2018-09-17
Attending: INTERNAL MEDICINE | Admitting: INTERNAL MEDICINE

## 2018-09-17 VITALS
HEIGHT: 65 IN | SYSTOLIC BLOOD PRESSURE: 138 MMHG | BODY MASS INDEX: 22.16 KG/M2 | DIASTOLIC BLOOD PRESSURE: 80 MMHG | WEIGHT: 133 LBS | HEART RATE: 60 BPM

## 2018-09-17 DIAGNOSIS — E78.00 HYPERCHOLESTEREMIA: ICD-10-CM

## 2018-09-17 DIAGNOSIS — I10 ESSENTIAL HYPERTENSION: ICD-10-CM

## 2018-09-17 DIAGNOSIS — Z95.2 S/P TAVR (TRANSCATHETER AORTIC VALVE REPLACEMENT): ICD-10-CM

## 2018-09-17 DIAGNOSIS — I35.0 NONRHEUMATIC AORTIC VALVE STENOSIS: Primary | ICD-10-CM

## 2018-09-17 DIAGNOSIS — I35.0 NONRHEUMATIC AORTIC VALVE STENOSIS: ICD-10-CM

## 2018-09-17 DIAGNOSIS — I48.0 PAF (PAROXYSMAL ATRIAL FIBRILLATION) (HCC): ICD-10-CM

## 2018-09-17 LAB
LV EF: 58 %
LVEF MODALITY: NORMAL

## 2018-09-17 PROCEDURE — 1123F ACP DISCUSS/DSCN MKR DOCD: CPT | Performed by: INTERNAL MEDICINE

## 2018-09-17 PROCEDURE — 1090F PRES/ABSN URINE INCON ASSESS: CPT | Performed by: INTERNAL MEDICINE

## 2018-09-17 PROCEDURE — 99214 OFFICE O/P EST MOD 30 MIN: CPT | Performed by: INTERNAL MEDICINE

## 2018-09-17 PROCEDURE — G8427 DOCREV CUR MEDS BY ELIG CLIN: HCPCS | Performed by: INTERNAL MEDICINE

## 2018-09-17 PROCEDURE — 1036F TOBACCO NON-USER: CPT | Performed by: INTERNAL MEDICINE

## 2018-09-17 PROCEDURE — 4040F PNEUMOC VAC/ADMIN/RCVD: CPT | Performed by: INTERNAL MEDICINE

## 2018-09-17 PROCEDURE — 1101F PT FALLS ASSESS-DOCD LE1/YR: CPT | Performed by: INTERNAL MEDICINE

## 2018-09-17 PROCEDURE — G8420 CALC BMI NORM PARAMETERS: HCPCS | Performed by: INTERNAL MEDICINE

## 2018-09-17 NOTE — COMMUNICATION BODY
23mm   NYHA   []I         [x]II           []III          []IV   TTE 7/16 7/17 8/19 60%  60%  60% TAVR MG 9, mod MR and TR  TAVR MG 13, severe MR  TAVR MG 12, mod TR, mod MR   Plan   Yearly echos  Continue current meds   ~HTN  Today BP [x] Controlled [] Borderline [] Uncontrolled   Counseling [x] Diet/Salt [x] Exercise [x] Weight    Plan Continue current medications at doses detailed above  ~Hyperchol  Goal LDL [] <100 [x] <70     Counseling [x] Diet [x] Weight [x] Exercise   Lipid/liver Monitor [x] PCP [] Cardio [] Endocrine   Plan Continue current doses of meds listed above  12/15 HDL:82, LDL:49  ~Afib   Date Detail   Type  paroxysmal   R/R  Regular, controlled   R/RM  Reviewed   CVS  >1   AC/AP  coumadin   Plan  Continue current medical regimen at doses noted above   ~Followup  Interval: 6 months  Tests/Labs: None    Scribe Attestation  ICarl, am scribing for and in the presence of Kendy Pickard MD.   SignedCarl 09/14/18 12:34 PM   Provider Magdiel Ayala is working as a scribe for and in the presence of me (Kendy Pickard MD). Working as a scribe, Carl Mckeon may have prepopulated components of this note with my historical  intellectual property under my direct supervision. Any additions to this intellectual property were performed in my presence and at my direction. Furthermore, the content and accuracy of this note have been reviewed by me Kendy Pickard MD).

## 2018-09-17 NOTE — LETTER
[address] [address] [address] [address]  Phone: [phone]  Fax: [phone]    Gonzalo Cardenas MD        September 17, 2018     Nikunj Hartman MD  [address] [place] [address]    Patient: Xenia Flynn  MR Number: [id number]  YOB: 1922  Date of Visit: 9/17/2018    Dear Dr. Nikunj Hartman: Thank you for the request for consultation for Eben Gamez to me for the evaluation of AS. Below are the relevant portions of my assessment and plan of care. [address]       H+P // CONSULT // OUTPATIENT VISIT // Mandi Armstrong     Referring Doctor Nikunj Hartman MD   Encounter Type Followup     CHIEF COMPLAINT     VisitType [] Acute [x] Chronic     Symptom [x] None [] CP [] SOB [] Dizzy [] Palps [] Fatigue     Problems AS, s/p TAVR, HTN, CHOL, pAFIB      HISTORY OF PRESENT ILLNESS     Doing well. Denies cp, sob. Compliant with meds. Noted recent episode of bleeding in lower leg. Resolving, asymptomatic. Symptom Y N Frequency Duration Severity Modify Assoc Sx Other   CP [] [x]         SOB [] [x]         Dizzy [] [x]         Syncope [] [x]         Palpitations [] [x]           COMPLIANCE     Category Meds Diet Salt Exercise Tobacco Alcohol Drugs   Compliant [x] [x] [x] [x] [x] [x] [x]   [x]Counseling given on all above above categories    HISTORY/ALLERGIES/ROS     MedHx:  has a past medical history of Anemia, pernicious; Arthritis; Cataract; High blood pressure; Hyperlipidemia; Hypothyroidism; Macular degeneration; Melanoma ([address] Nocturia; Urinary incontinence; and Vertigo. SurgHx:  has a past surgical history that includes joint replacement (2008); Inguinal hernia repair (2007); Cataract removal (2010); malignant skin lesion excision (2010); Endoscopy, colon, diagnostic (5/23/16); percutaneous balloon valvuloplasty (5/20/2016); and Cardioversion (01/2017). Gen Alert, coop, no distress Heart  RRR, 2/6   Head NC, AT, no abnorm Abd  Soft, NT, +BS, no mass, no OM   Eyes PER, conj/corn clear Ext  Ext nl, AT, no C/C/E   Nose Nares nl, no drain, NT Pulse 2+ and symmetric   Throat Lips, mucosa, tongue nl Skin Col/text/turg nl, no vis rash/les   Neck S/S, TM, NT, no bruit/JVD Psych Nl mood and affect   Lung CTA-B, unlabored, no DTP Lymph   No cervical or axillary LA   Ch wall NT, no deform Neuro  Nl gross M/S exam   R leg, bruising from knee to toes, nontender  ASSESSMENT AND PLAN     ~AS   Date EF Detail   Sx   No concerning   Hx 7/16  TAVR with ES3 23mm   NYHA   []I         [x]II           []III          []IV   TTE 7/16 7/17 8/19 60%  60%  60% TAVR MG 9, mod MR and TR  TAVR MG 13, severe MR  TAVR MG 12, mod TR, mod MR   Plan   Yearly echos  Continue current meds   ~HTN  Today BP [x] Controlled [] Borderline [] Uncontrolled   Counseling [x] Diet/Salt [x] Exercise [x] Weight    Plan Continue current medications at doses detailed above  ~Hyperchol  Goal LDL [] <100 [x] <70     Counseling [x] Diet [x] Weight [x] Exercise   Lipid/liver Monitor [x] PCP [] Cardio [] Endocrine   Plan Continue current doses of meds listed above  12/15 HDL:82, LDL:49  ~Afib   Date Detail   Type  paroxysmal   R/R  Regular, controlled   R/RM  Reviewed   CVS  >1   AC/AP  coumadin   Plan  Continue current medical regimen at doses noted above   ~Followup  Interval: 6 months  Tests/Labs: None    Scribe Attestation  Marty HALEY, am scribing for and in the presence of Kraig Peterson MD.   SignedMarty 09/14/18 12:34 PM   Provider Jesús President is working as a scribe for and in the presence of me (Kraig Peterson MD). Working as a scribe, Marty Carmichael may have prepopulated components of this note with my historical  intellectual property under my direct supervision. Any additions to this intellectual property were performed in my presence and at my direction.   Furthermore,

## 2018-09-24 ENCOUNTER — ANTI-COAG VISIT (OUTPATIENT)
Dept: PHARMACY | Age: 83
End: 2018-09-24
Payer: MEDICARE

## 2018-09-24 DIAGNOSIS — I48.19 PERSISTENT ATRIAL FIBRILLATION (HCC): ICD-10-CM

## 2018-09-24 LAB — INTERNATIONAL NORMALIZATION RATIO, POC: 2.5

## 2018-09-24 PROCEDURE — 85610 PROTHROMBIN TIME: CPT

## 2018-09-24 PROCEDURE — 99211 OFF/OP EST MAY X REQ PHY/QHP: CPT

## 2018-10-04 ENCOUNTER — TELEPHONE (OUTPATIENT)
Dept: CARDIOLOGY CLINIC | Age: 83
End: 2018-10-04

## 2018-10-22 ENCOUNTER — ANTI-COAG VISIT (OUTPATIENT)
Dept: PHARMACY | Age: 83
End: 2018-10-22
Payer: MEDICARE

## 2018-10-22 DIAGNOSIS — I48.19 PERSISTENT ATRIAL FIBRILLATION (HCC): ICD-10-CM

## 2018-10-22 LAB — INTERNATIONAL NORMALIZATION RATIO, POC: 1.8

## 2018-10-22 PROCEDURE — 85610 PROTHROMBIN TIME: CPT

## 2018-10-22 PROCEDURE — 99211 OFF/OP EST MAY X REQ PHY/QHP: CPT

## 2018-11-05 ENCOUNTER — OFFICE VISIT (OUTPATIENT)
Dept: ORTHOPEDIC SURGERY | Age: 83
End: 2018-11-05
Payer: MEDICARE

## 2018-11-05 VITALS
HEART RATE: 59 BPM | BODY MASS INDEX: 22.16 KG/M2 | TEMPERATURE: 98.6 F | WEIGHT: 133 LBS | HEIGHT: 65 IN | SYSTOLIC BLOOD PRESSURE: 139 MMHG | DIASTOLIC BLOOD PRESSURE: 62 MMHG

## 2018-11-05 DIAGNOSIS — M17.11 PRIMARY OSTEOARTHRITIS OF RIGHT KNEE: ICD-10-CM

## 2018-11-05 DIAGNOSIS — M70.62 GREATER TROCHANTERIC BURSITIS OF LEFT HIP: ICD-10-CM

## 2018-11-05 DIAGNOSIS — M79.605 LEG PAIN, LATERAL, LEFT: Primary | ICD-10-CM

## 2018-11-05 PROCEDURE — G8484 FLU IMMUNIZE NO ADMIN: HCPCS | Performed by: PHYSICIAN ASSISTANT

## 2018-11-05 PROCEDURE — 1090F PRES/ABSN URINE INCON ASSESS: CPT | Performed by: PHYSICIAN ASSISTANT

## 2018-11-05 PROCEDURE — 1101F PT FALLS ASSESS-DOCD LE1/YR: CPT | Performed by: PHYSICIAN ASSISTANT

## 2018-11-05 PROCEDURE — 1123F ACP DISCUSS/DSCN MKR DOCD: CPT | Performed by: PHYSICIAN ASSISTANT

## 2018-11-05 PROCEDURE — G8427 DOCREV CUR MEDS BY ELIG CLIN: HCPCS | Performed by: PHYSICIAN ASSISTANT

## 2018-11-05 PROCEDURE — 99213 OFFICE O/P EST LOW 20 MIN: CPT | Performed by: PHYSICIAN ASSISTANT

## 2018-11-05 PROCEDURE — 1036F TOBACCO NON-USER: CPT | Performed by: PHYSICIAN ASSISTANT

## 2018-11-05 PROCEDURE — 20610 DRAIN/INJ JOINT/BURSA W/O US: CPT | Performed by: PHYSICIAN ASSISTANT

## 2018-11-05 PROCEDURE — G8420 CALC BMI NORM PARAMETERS: HCPCS | Performed by: PHYSICIAN ASSISTANT

## 2018-11-05 PROCEDURE — 4040F PNEUMOC VAC/ADMIN/RCVD: CPT | Performed by: PHYSICIAN ASSISTANT

## 2018-11-05 RX ORDER — FUROSEMIDE 40 MG/1
40 TABLET ORAL DAILY
Qty: 90 TABLET | Refills: 1 | Status: ON HOLD | OUTPATIENT
Start: 2018-11-05 | End: 2018-11-16 | Stop reason: HOSPADM

## 2018-11-06 PROBLEM — M70.62 GREATER TROCHANTERIC BURSITIS OF LEFT HIP: Status: ACTIVE | Noted: 2018-11-06

## 2018-11-06 NOTE — PROGRESS NOTES
Subjective:      Patient ID: Lisa Feliz is a 80 y.o.  female. Chief Complaint   Patient presents with    Knee Pain     right knee- cortisone injection 8/13/18        HPI: She is here for evaluation and treatment of right knee pain related to arthritis. She states the previous injection which was performed 8/13/18  helped relieve the knee symptoms. The knee pain has gradually returned. There has not been a recent injury. Pain is 8/10. Pain is worse with activity. Pain improves somewhat with rest and elevation. She also has new complaint today. Left lateral hip pain. Pain lying on the hip at night or with prolonged sitting in a chair. Denies any groin pain. Denies any numbness or tingling the lower extremity. Denies any traumatic injury such as a fall. Review of Systems:   Negative for fever or chills. Negative for numbness or tingling around the knee. Past Medical History:   Diagnosis Date    Anemia, pernicious     Arthritis     both hand (R worse than the L)    Cataract     both eyes    High blood pressure     previous history but off of medication for 1 year.     Hyperlipidemia     on statin    Hypothyroidism     oral supplementation    Macular degeneration     injection to left eye every 6 weeks    Melanoma ([facility] [address] 2014    lesions removed from nose and forehead    Nocturia     Urinary incontinence 2006    Vertigo 2006    treated with meclizine       Family History   Problem Relation Age of Onset    Cancer Mother     Heart Disease Father        Past Surgical History:   Procedure Laterality Date    CARDIOVERSION  01/2017    CATARACT REMOVAL  2010    bilateral removal    ENDOSCOPY, COLON, DIAGNOSTIC  5/23/16    Esophagogastroduodenoscopy with biopsy    INGUINAL HERNIA REPAIR  2007    JOINT REPLACEMENT  2008    left  knee     MALIGNANT SKIN LESION EXCISION  2010    nose and forehead    PERCUTANEOUS BALLOON VALVULOPLASTY  5/20/2016    BAV       Social History Occupational History    Retired      Social History Main Topics    Smoking status: Former Smoker     Quit date: 1/1/1950    Smokeless tobacco: Never Used      Comment: social, quit over 50 years ago    Alcohol use No    Drug use: No    Sexual activity: Not Currently       Current Outpatient Prescriptions   Medication Sig Dispense Refill    furosemide (LASIX) 40 MG tablet TAKE 1 TABLET BY MOUTH DAILY 90 tablet 1    Mirabegron ER (MYRBETRIQ) 25 MG TB24 Take 25 mg by mouth daily      warfarin (COUMADIN) 5 MG tablet Take 1 tablet by mouth daily EXCEPT 2.5mg every Sunday, Tuesday and Thursday or as directed by [facility] Coumadin Service [phone] 90 tablet 3    amiodarone (CORDARONE) 200 MG tablet Take 0.5 tablets by mouth daily 30 tablet 5    potassium chloride (KLOR-CON M) 20 MEQ extended release tablet Take 1 tablet by mouth daily 90 tablet 3    furosemide (LASIX) 20 MG tablet Take 20mg alternating with 40mg QOD. 90 tablet 3    levothyroxine (SYNTHROID) 100 MCG tablet TAKE 1 TABLET BY MOUTH DAILY 90 tablet 1    metroNIDAZOLE (METROGEL) 0.75 % gel Apply topically every 24 hours Apply topically daily to face.  Multiple Vitamins-Minerals (OCUVITE PO) Take by mouth      nystatin (MYCOSTATIN) 023730 UNIT/GM powder Apply 3 times daily. 30 g 3    diazepam (VALIUM) 2 MG tablet Take 1 tablet by mouth nightly as needed for Anxiety or Sleep 5 tablet 2    pantoprazole (PROTONIX) 40 MG tablet Take 1 tablet by mouth 2 times daily (before meals) (Patient taking differently: Take 40 mg by mouth daily ) 180 tablet 3    aspirin 81 MG tablet Take 81 mg by mouth daily       No current facility-administered medications for this visit. Objective:   She is alert, oriented x 3, pleasant, well nourished, developed and in no acute distress.     /62   Pulse 59   Temp 98.6 °F (37 °C)   Ht 5' 5\" (1.651 m)   Wt 133 lb (60.3 kg)   BMI 22.13 kg/m²      KNEE EXAM:  Examination of the right knee shows:

## 2018-11-14 ENCOUNTER — HOSPITAL ENCOUNTER (INPATIENT)
Age: 83
LOS: 3 days | Discharge: NURSING, REHAB OR LONG-TERM CARE | DRG: 563 | End: 2018-11-17
Attending: SPECIALIST | Admitting: SPECIALIST
Payer: MEDICARE

## 2018-11-14 ENCOUNTER — APPOINTMENT (OUTPATIENT)
Dept: CT IMAGING | Age: 83
DRG: 563 | End: 2018-11-14
Payer: MEDICARE

## 2018-11-14 ENCOUNTER — TELEPHONE (OUTPATIENT)
Dept: ORTHOPEDIC SURGERY | Age: 83
End: 2018-11-14

## 2018-11-14 ENCOUNTER — HOSPITAL ENCOUNTER (EMERGENCY)
Age: 83
Discharge: HOME | DRG: 563 | End: 2018-11-14
Attending: EMERGENCY MEDICINE
Payer: MEDICARE

## 2018-11-14 ENCOUNTER — APPOINTMENT (OUTPATIENT)
Dept: GENERAL RADIOLOGY | Age: 83
DRG: 563 | End: 2018-11-14
Payer: MEDICARE

## 2018-11-14 VITALS
BODY MASS INDEX: 24.13 KG/M2 | OXYGEN SATURATION: 99 % | TEMPERATURE: 98.1 F | HEIGHT: 65 IN | RESPIRATION RATE: 16 BRPM | WEIGHT: 144.84 LBS | DIASTOLIC BLOOD PRESSURE: 77 MMHG | HEART RATE: 64 BPM | SYSTOLIC BLOOD PRESSURE: 178 MMHG
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
## 2018-11-14 DIAGNOSIS — M25.462 EFFUSION OF LEFT KNEE: ICD-10-CM

## 2018-11-14 DIAGNOSIS — S80.00XA CONTUSION OF KNEE, UNSPECIFIED LATERALITY, INITIAL ENCOUNTER: ICD-10-CM

## 2018-11-14 DIAGNOSIS — T14.8XXA BRUISING: ICD-10-CM

## 2018-11-14 DIAGNOSIS — M25.562 ACUTE PAIN OF LEFT KNEE: Primary | ICD-10-CM

## 2018-11-14 DIAGNOSIS — Z96.652 HISTORY OF TOTAL KNEE REPLACEMENT, LEFT: ICD-10-CM

## 2018-11-14 LAB
ALBUMIN SERPL-MCNC: 3.6 G/DL (ref 3.4–5)
ALP BLD-CCNC: 74 U/L (ref 40–129)
ALT SERPL-CCNC: 14 U/L (ref 10–40)
ANION GAP SERPL CALCULATED.3IONS-SCNC: 12 MMOL/L (ref 3–16)
AST SERPL-CCNC: 22 U/L (ref 15–37)
BASOPHILS ABSOLUTE: 0 K/UL (ref 0–0.2)
BASOPHILS RELATIVE PERCENT: 0.4 %
BILIRUB SERPL-MCNC: 0.6 MG/DL (ref 0–1)
BILIRUBIN DIRECT: <0.2 MG/DL (ref 0–0.3)
BILIRUBIN URINE: NEGATIVE
BILIRUBIN, INDIRECT: ABNORMAL MG/DL (ref 0–1)
BLOOD, URINE: NEGATIVE
BUN BLDV-MCNC: 24 MG/DL (ref 7–20)
CALCIUM SERPL-MCNC: 9.1 MG/DL (ref 8.3–10.6)
CHLORIDE BLD-SCNC: 98 MMOL/L (ref 99–110)
CLARITY: CLEAR
CO2: 25 MMOL/L (ref 21–32)
COLOR: YELLOW
CREAT SERPL-MCNC: 1 MG/DL (ref 0.6–1.2)
EOSINOPHILS ABSOLUTE: 0.3 K/UL (ref 0–0.6)
EOSINOPHILS RELATIVE PERCENT: 2.9 %
EPITHELIAL CELLS, UA: 1 /HPF (ref 0–5)
GFR AFRICAN AMERICAN: >60
GFR NON-AFRICAN AMERICAN: 51
GLUCOSE BLD-MCNC: 109 MG/DL (ref 70–99)
GLUCOSE URINE: NEGATIVE MG/DL
HCT VFR BLD CALC: 34.4 % (ref 36–48)
HEMOGLOBIN: 11.5 G/DL (ref 12–16)
HYALINE CASTS: 1 /LPF (ref 0–8)
INR BLD: 1.94 (ref 0.86–1.14)
KETONES, URINE: NEGATIVE MG/DL
LEUKOCYTE ESTERASE, URINE: ABNORMAL
LIPASE: 18 U/L (ref 13–60)
LYMPHOCYTES ABSOLUTE: 1 K/UL (ref 1–5.1)
LYMPHOCYTES RELATIVE PERCENT: 10.7 %
MCH RBC QN AUTO: 32.5 PG (ref 26–34)
MCHC RBC AUTO-ENTMCNC: 33.4 G/DL (ref 31–36)
MCV RBC AUTO: 97.3 FL (ref 80–100)
MICROSCOPIC EXAMINATION: YES
MONOCYTES ABSOLUTE: 0.7 K/UL (ref 0–1.3)
MONOCYTES RELATIVE PERCENT: 7.4 %
NEUTROPHILS ABSOLUTE: 7 K/UL (ref 1.7–7.7)
NEUTROPHILS RELATIVE PERCENT: 78.6 %
NITRITE, URINE: NEGATIVE
PDW BLD-RTO: 16.6 % (ref 12.4–15.4)
PH UA: 7
PLATELET # BLD: 233 K/UL (ref 135–450)
PMV BLD AUTO: 7 FL (ref 5–10.5)
POTASSIUM REFLEX MAGNESIUM: 3.9 MMOL/L (ref 3.5–5.1)
PRO-BNP: 1351 PG/ML (ref 0–449)
PROTEIN UA: NEGATIVE MG/DL
PROTHROMBIN TIME: 22.1 SEC (ref 9.8–13)
RBC # BLD: 3.54 M/UL (ref 4–5.2)
RBC UA: 1 /HPF (ref 0–4)
SODIUM BLD-SCNC: 135 MMOL/L (ref 136–145)
SPECIFIC GRAVITY UA: 1
TOTAL PROTEIN: 6.3 G/DL (ref 6.4–8.2)
URINE REFLEX TO CULTURE: YES
URINE TYPE: ABNORMAL
UROBILINOGEN, URINE: 1 E.U./DL
WBC # BLD: 8.9 K/UL (ref 4–11)
WBC UA: 3 /HPF (ref 0–5)

## 2018-11-14 PROCEDURE — 80076 HEPATIC FUNCTION PANEL: CPT

## 2018-11-14 PROCEDURE — 73502 X-RAY EXAM HIP UNI 2-3 VIEWS: CPT

## 2018-11-14 PROCEDURE — 80048 BASIC METABOLIC PNL TOTAL CA: CPT

## 2018-11-14 PROCEDURE — 85610 PROTHROMBIN TIME: CPT

## 2018-11-14 PROCEDURE — 83690 ASSAY OF LIPASE: CPT

## 2018-11-14 PROCEDURE — 99284 EMERGENCY DEPT VISIT MOD MDM: CPT

## 2018-11-14 PROCEDURE — 1200000000 HC SEMI PRIVATE

## 2018-11-14 PROCEDURE — 93005 ELECTROCARDIOGRAM TRACING: CPT | Performed by: EMERGENCY MEDICINE

## 2018-11-14 PROCEDURE — 73560 X-RAY EXAM OF KNEE 1 OR 2: CPT

## 2018-11-14 PROCEDURE — 93010 ELECTROCARDIOGRAM REPORT: CPT | Performed by: INTERNAL MEDICINE

## 2018-11-14 PROCEDURE — 70450 CT HEAD/BRAIN W/O DYE: CPT

## 2018-11-14 PROCEDURE — 87086 URINE CULTURE/COLONY COUNT: CPT

## 2018-11-14 PROCEDURE — 99285 EMERGENCY DEPT VISIT HI MDM: CPT

## 2018-11-14 PROCEDURE — 83880 ASSAY OF NATRIURETIC PEPTIDE: CPT

## 2018-11-14 PROCEDURE — 81001 URINALYSIS AUTO W/SCOPE: CPT

## 2018-11-14 PROCEDURE — 87186 SC STD MICRODIL/AGAR DIL: CPT

## 2018-11-14 PROCEDURE — 87077 CULTURE AEROBIC IDENTIFY: CPT

## 2018-11-14 PROCEDURE — 36415 COLL VENOUS BLD VENIPUNCTURE: CPT

## 2018-11-14 PROCEDURE — 85025 COMPLETE CBC W/AUTO DIFF WBC: CPT

## 2018-11-14 PROCEDURE — 73700 CT LOWER EXTREMITY W/O DYE: CPT

## 2018-11-14 RX ORDER — AMOXICILLIN 250 MG
1 CAPSULE ORAL DAILY
COMMUNITY

## 2018-11-14 RX ORDER — HYDROCODONE BITARTRATE AND ACETAMINOPHEN 5; 325 MG/1; MG/1
1 TABLET ORAL EVERY 6 HOURS PRN
Qty: 12 TABLET | Refills: 0 | Status: SHIPPED | OUTPATIENT
Start: 2018-11-14 | End: 2018-11-21

## 2018-11-14 RX ORDER — HYDROCODONE BITARTRATE AND ACETAMINOPHEN 5; 325 MG/1; MG/1
1 TABLET ORAL EVERY 4 HOURS PRN
Status: DISCONTINUED | OUTPATIENT
Start: 2018-11-14 | End: 2018-11-17 | Stop reason: HOSPADM

## 2018-11-14 ASSESSMENT — ENCOUNTER SYMPTOMS
COUGH: 0
STRIDOR: 0
CHEST TIGHTNESS: 0
EYE PAIN: 0
EYE REDNESS: 0
WHEEZING: 0
CHOKING: 0
PHOTOPHOBIA: 0
EYE DISCHARGE: 0
SHORTNESS OF BREATH: 0
ABDOMINAL DISTENTION: 0
EYE ITCHING: 0
APNEA: 0

## 2018-11-14 ASSESSMENT — PAIN SCALES - GENERAL
PAINLEVEL_OUTOF10: 10
PAINLEVEL_OUTOF10: 0

## 2018-11-14 ASSESSMENT — PAIN DESCRIPTION - LOCATION: LOCATION: KNEE

## 2018-11-14 ASSESSMENT — PAIN DESCRIPTION - PAIN TYPE: TYPE: ACUTE PAIN

## 2018-11-14 ASSESSMENT — PAIN DESCRIPTION - ORIENTATION: ORIENTATION: LEFT

## 2018-11-14 NOTE — TELEPHONE ENCOUNTER
Pt's daughter Sudha Mon calling to let [facility] know that her knee pain has worsened and it is hard for pt to put weight on her legs   Has few concerns about what to for the pt since the pt can't take certain meds

## 2018-11-14 NOTE — ED PROVIDER NOTES
[facility]  eMERGENCY dEPARTMENT eNCOUnter      Pt Name: Milton Vasquez  MRN: [id number]  [place] 6/25/1922  Date of evaluation: 11/14/2018  Provider: Deobra Valdez MD    CHIEF COMPLAINT       Chief Complaint   Patient presents with    Fall     Pt to ED from [address] [place] [address] via [facility] f/t fall. Per EMS report this is pt's 3rd time falling since last night. Pt denies hitting her head, denies LOC and denies pain from fall. Pt is on Coumadin. HISTORY OF PRESENT ILLNESS    Milton Vasquez is a 80 y.o. female who presents to the emergency department with fall. 3 falls in last 7 days per family. No dizziness prior to falls per patient. Mechanical in nature. No head trauma. Patient currently without complaints. Sent from [address] home via EMS for fall. Nursing Notes were reviewed. REVIEW OF SYSTEMS       Review of Systems   Constitutional: Negative for activity change, appetite change, chills, diaphoresis, fatigue, fever and unexpected weight change. HENT: Negative for congestion, dental problem, drooling and ear discharge. Eyes: Negative for photophobia, pain, discharge, redness and itching. Respiratory: Negative for apnea, cough, choking, chest tightness, shortness of breath, wheezing and stridor. Cardiovascular: Negative for chest pain and leg swelling. Gastrointestinal: Negative for abdominal distention. Endocrine: Negative for polyphagia and polyuria. Genitourinary: Negative for vaginal bleeding, vaginal discharge and vaginal pain. Musculoskeletal: Negative for arthralgias. Neurological: Negative for dizziness, facial asymmetry and headaches. Hematological: Negative for adenopathy. Does not bruise/bleed easily. Psychiatric/Behavioral: Negative for agitation, behavioral problems, confusion, decreased concentration, dysphoric mood, hallucinations, self-injury, sleep disturbance and suicidal ideas.  The patient is not METABOLIC PANEL W/ REFLEX TO MG FOR LOW K   LIPASE   HEPATIC FUNCTION PANEL   BRAIN NATRIURETIC PEPTIDE       All other labs were withinnormal range or not returned as of this dictation. EMERGENCY DEPARTMENT COURSE and DIFFERENTIAL DIAGNOSIS/MDM:     I discussed with patient the results of evaluation in the ED, diagnosis, care, and prognosis. The plan is to discharge to home. Patient is in agreement with plan and questions have been answered.      I also discussed with patient the reasons which may require a return visit and the importance of follow-up care. The patient is well-appearing, nontoxic, and improved at the time of discharge. Patient agrees to call to arrange follow-up care as directed. Patient understands to return immediately for worsening/change in symptoms. CRITICAL CARE TIME   Total Critical Caretime was 0 minutes, excluding separately reportable procedures. There was a high probability of clinically significant/life threatening deterioration in the patient's condition which required my urgent intervention. PROCEDURES:  Unlessotherwise noted below, none    FINAL IMPRESSION      1. Fall, initial encounter    2. Contusion of knee, unspecified laterality, initial encounter    3. Bruising          DISPOSITION/PLAN   DISPOSITION      PATIENT REFERRED TO:  Dottie Jacques MD  [address] [address]  [phone]          DISCHARGE MEDICATIONS:  New Prescriptions    HYDROCODONE-ACETAMINOPHEN (NORCO) 5-325 MG PER TABLET    Take 1 tablet by mouth every 6 hours as needed for Pain for up to 7 days. Sedation precautions please.           (Please note that portions ofthis note were completed with a voice recognition program.  Efforts were made to edit the dictations but occasionally words are mis-transcribed.)    Virginia Cervantes MD(electronically signed)  Attending Emergency

## 2018-11-15 LAB
INR BLD: 2.33 (ref 0.86–1.14)
PROTHROMBIN TIME: 26.6 SEC (ref 9.8–13)

## 2018-11-15 PROCEDURE — 97530 THERAPEUTIC ACTIVITIES: CPT

## 2018-11-15 PROCEDURE — 6370000000 HC RX 637 (ALT 250 FOR IP): Performed by: SPECIALIST

## 2018-11-15 PROCEDURE — 99221 1ST HOSP IP/OBS SF/LOW 40: CPT | Performed by: NURSE PRACTITIONER

## 2018-11-15 PROCEDURE — G8978 MOBILITY CURRENT STATUS: HCPCS | Performed by: PHYSICAL THERAPIST

## 2018-11-15 PROCEDURE — 6370000000 HC RX 637 (ALT 250 FOR IP): Performed by: NURSE PRACTITIONER

## 2018-11-15 PROCEDURE — 85610 PROTHROMBIN TIME: CPT

## 2018-11-15 PROCEDURE — 1200000000 HC SEMI PRIVATE

## 2018-11-15 PROCEDURE — 97530 THERAPEUTIC ACTIVITIES: CPT | Performed by: PHYSICAL THERAPIST

## 2018-11-15 PROCEDURE — G8988 SELF CARE GOAL STATUS: HCPCS

## 2018-11-15 PROCEDURE — G8979 MOBILITY GOAL STATUS: HCPCS | Performed by: PHYSICAL THERAPIST

## 2018-11-15 PROCEDURE — 97163 PT EVAL HIGH COMPLEX 45 MIN: CPT | Performed by: PHYSICAL THERAPIST

## 2018-11-15 PROCEDURE — 36415 COLL VENOUS BLD VENIPUNCTURE: CPT

## 2018-11-15 PROCEDURE — 94760 N-INVAS EAR/PLS OXIMETRY 1: CPT

## 2018-11-15 PROCEDURE — 97166 OT EVAL MOD COMPLEX 45 MIN: CPT

## 2018-11-15 PROCEDURE — 97535 SELF CARE MNGMENT TRAINING: CPT

## 2018-11-15 PROCEDURE — G8987 SELF CARE CURRENT STATUS: HCPCS

## 2018-11-15 RX ORDER — HYDROCODONE BITARTRATE AND ACETAMINOPHEN 5; 325 MG/1; MG/1
1 TABLET ORAL EVERY 6 HOURS PRN
Status: DISCONTINUED | OUTPATIENT
Start: 2018-11-15 | End: 2018-11-15 | Stop reason: SDUPTHER

## 2018-11-15 RX ORDER — ACETAMINOPHEN 325 MG/1
650 TABLET ORAL EVERY 8 HOURS SCHEDULED
Status: DISCONTINUED | OUTPATIENT
Start: 2018-11-15 | End: 2018-11-17 | Stop reason: HOSPADM

## 2018-11-15 RX ORDER — WARFARIN SODIUM 5 MG/1
5 TABLET ORAL DAILY
Status: DISCONTINUED | OUTPATIENT
Start: 2018-11-15 | End: 2018-11-15

## 2018-11-15 RX ORDER — PANTOPRAZOLE SODIUM 40 MG/1
40 TABLET, DELAYED RELEASE ORAL
Status: DISCONTINUED | OUTPATIENT
Start: 2018-11-15 | End: 2018-11-17 | Stop reason: HOSPADM

## 2018-11-15 RX ORDER — AMIODARONE HYDROCHLORIDE 200 MG/1
100 TABLET ORAL DAILY
Status: DISCONTINUED | OUTPATIENT
Start: 2018-11-15 | End: 2018-11-17 | Stop reason: HOSPADM

## 2018-11-15 RX ORDER — LEVOTHYROXINE SODIUM 0.1 MG/1
100 TABLET ORAL
Status: DISCONTINUED | OUTPATIENT
Start: 2018-11-15 | End: 2018-11-17 | Stop reason: HOSPADM

## 2018-11-15 RX ORDER — DIAZEPAM 2 MG/1
2 TABLET ORAL NIGHTLY PRN
Status: DISCONTINUED | OUTPATIENT
Start: 2018-11-15 | End: 2018-11-17 | Stop reason: HOSPADM

## 2018-11-15 RX ORDER — ACETAMINOPHEN 325 MG/1
650 TABLET ORAL EVERY 8 HOURS PRN
Status: DISCONTINUED | OUTPATIENT
Start: 2018-11-15 | End: 2018-11-15

## 2018-11-15 RX ORDER — WARFARIN SODIUM 2.5 MG/1
2.5 TABLET ORAL
Status: COMPLETED | OUTPATIENT
Start: 2018-11-15 | End: 2018-11-15

## 2018-11-15 RX ORDER — SENNA AND DOCUSATE SODIUM 50; 8.6 MG/1; MG/1
1 TABLET, FILM COATED ORAL DAILY
Status: DISCONTINUED | OUTPATIENT
Start: 2018-11-15 | End: 2018-11-17 | Stop reason: HOSPADM

## 2018-11-15 RX ADMIN — DIAZEPAM 2 MG: 2 TABLET ORAL at 21:04

## 2018-11-15 RX ADMIN — PANTOPRAZOLE SODIUM 40 MG: 40 TABLET, DELAYED RELEASE ORAL at 06:37

## 2018-11-15 RX ADMIN — WARFARIN SODIUM 2.5 MG: 2.5 TABLET ORAL at 17:48

## 2018-11-15 RX ADMIN — SENNOSIDES AND DOCUSATE SODIUM 1 TABLET: 8.6; 5 TABLET ORAL at 09:04

## 2018-11-15 RX ADMIN — AMIODARONE HYDROCHLORIDE 100 MG: 200 TABLET ORAL at 09:04

## 2018-11-15 RX ADMIN — LEVOTHYROXINE SODIUM 100 MCG: 100 TABLET ORAL at 06:37

## 2018-11-15 RX ADMIN — ACETAMINOPHEN 650 MG: 325 TABLET, FILM COATED ORAL at 13:23

## 2018-11-15 RX ADMIN — HYDROCODONE BITARTRATE AND ACETAMINOPHEN 1 TABLET: 5; 325 TABLET ORAL at 00:30

## 2018-11-15 RX ADMIN — ACETAMINOPHEN 650 MG: 325 TABLET, FILM COATED ORAL at 21:04

## 2018-11-15 ASSESSMENT — PAIN SCALES - GENERAL
PAINLEVEL_OUTOF10: 4
PAINLEVEL_OUTOF10: 5
PAINLEVEL_OUTOF10: 0
PAINLEVEL_OUTOF10: 5
PAINLEVEL_OUTOF10: 3
PAINLEVEL_OUTOF10: 6
PAINLEVEL_OUTOF10: 0

## 2018-11-15 ASSESSMENT — PAIN DESCRIPTION - PAIN TYPE
TYPE: ACUTE PAIN

## 2018-11-15 ASSESSMENT — PAIN DESCRIPTION - DESCRIPTORS
DESCRIPTORS: ACHING;CONSTANT
DESCRIPTORS: THROBBING
DESCRIPTORS: ACHING

## 2018-11-15 ASSESSMENT — PAIN DESCRIPTION - LOCATION
LOCATION: KNEE

## 2018-11-15 ASSESSMENT — PAIN DESCRIPTION - FREQUENCY
FREQUENCY: CONTINUOUS

## 2018-11-15 ASSESSMENT — PAIN DESCRIPTION - PROGRESSION
CLINICAL_PROGRESSION: NOT CHANGED

## 2018-11-15 ASSESSMENT — PAIN DESCRIPTION - ORIENTATION
ORIENTATION: LEFT

## 2018-11-15 ASSESSMENT — PAIN DESCRIPTION - ONSET
ONSET: ON-GOING

## 2018-11-15 NOTE — ED PROVIDER NOTES
[facility] [address]  eMERGENCY dEPARTMENT eNCOUnter      Pt Name: Sid Castillo  MRN: [id number]  [place] 6/25/1922  Date of evaluation: 11/14/2018  Provider: MAXIMO Plascencia    Evaluated by Advanced Practice Provider with Attending Physician available for consultation. CHIEF COMPLAINT       Chief Complaint   Patient presents with    Knee Pain     pt was seen last night after a fall pt came back in with increase in pain         HISTORY OF PRESENT ILLNESS  (Location/Symptom, Timing/Onset, Context/Setting, Quality, Duration, Modifying Factors, Severity.)   Sid Castillo is a 80 y.o. female who presents to the emergency department with the complaint of left knee pain. Patient was in this emergency department early this morning for evaluation following several falls in her home, an assisted living facility. She was evaluated and discharged, and an x-ray of her knee was performed with no acute findings. Patient reports history of left total knee replacement at least 10 years ago. His family members at bedside stated the patient has been completely unable to bear weight on the left leg since leaving the hospital, and there is even some pain when trying to move around, and they're concerned about this. They said they were given a prescription for Vicodin upon discharge, but hadn't filled it, because they were advised that it might only increase the risk of further falls. This patient has had multiple falls over the last couple of days, and they're aware that she might need a higher level of care in her living situation. Patient says she doesn't really have pain in the knee at rest, but it hurts a lot to put weight on it, mostly on the lateral aspect of the knee. She says she also thinks it looks swollen. She denies numbness. Nursing Notes were reviewed and I agree. REVIEW OF SYSTEMS    (2-9 systems for level 4, 10 or more for level 5)     Constitutional:  Negative for fever, chills. (LASIX) 40 MG TABLET    TAKE 1 TABLET BY MOUTH DAILY    HYDROCODONE-ACETAMINOPHEN (NORCO) 5-325 MG PER TABLET    Take 1 tablet by mouth every 6 hours as needed for Pain for up to 7 days. Sedation precautions please. LEVOTHYROXINE (SYNTHROID) 100 MCG TABLET    TAKE 1 TABLET BY MOUTH DAILY    METRONIDAZOLE (METROGEL) 0.75 % GEL    Apply topically every 24 hours Apply topically daily to face. MIRABEGRON ER (MYRBETRIQ) 25 MG TB24    Take 25 mg by mouth daily    MULTIPLE VITAMINS-MINERALS (OCUVITE PO)    Take by mouth    NYSTATIN (MYCOSTATIN) 923611 UNIT/GM POWDER    Apply 3 times daily. PANTOPRAZOLE (PROTONIX) 40 MG TABLET    Take 1 tablet by mouth 2 times daily (before meals)    POTASSIUM CHLORIDE (KLOR-CON M) 20 MEQ EXTENDED RELEASE TABLET    Take 1 tablet by mouth daily    WARFARIN (COUMADIN) 5 MG TABLET    Take 1 tablet by mouth daily EXCEPT 2.5mg every , Tuesday and Thursday or as directed by [facility] Coumadin Service [phone]       ALLERGIES     Zithromax [azithromycin]; Erythromycin; Amoxicillin; and Aspirin    FAMILY HISTORY       Family History   Problem Relation Age of Onset    Cancer Mother     Heart Disease Father      Family Status   Relation Status    Mother  at age 59        cancer   [facility] Father  at age 40        heart        SOCIAL HISTORY      reports that she quit smoking about 68 years ago. She has never used smokeless tobacco. She reports that she does not drink alcohol or use drugs. PHYSICAL EXAM    (up to 7 for level 4, 8 or more for level 5)     ED Triage Vitals [18]   BP Temp Temp src Pulse Resp SpO2 Height Weight   (!) 154/58 97.9 °F (36.6 °C) -- 64 16 100 % 5' 5\" (1.651 m) 150 lb 12.7 oz (68.4 kg)       Constitutional:  Appearing well-developed and well-nourished. No distress. HENT:  Normocephalic and atraumatic. Cardiovascular:  Normal rate, regular rhythm, normal heart sounds and intact distal pulses.     Pulmonary/Chest:  Effort normal and

## 2018-11-15 NOTE — PROGRESS NOTES
Physical Therapy    Facility/Department: [address] ORTHOPEDICS  Initial Assessment    NAME: Marshall Aponte  : 1922  MRN: [id number]    Date of Service: 11/15/2018    Discharge Recommendations:  Patient would benefit from continued therapy after discharge, 3-5 sessions per week   PT Equipment Recommendations  Other: defer to next level of care  Marshall Aponte scored a 8/24 on the AM-PAC short mobility form. Current research shows that an AM-PAC score of 17 or less is typically not associated with a discharge to the patient's home setting. Based on the patients AM-PAC score and their current functional mobility deficits, it is recommended that the patient have 3-5 sessions per week of Physical Therapy at d/c to increase the patients independence. Patient Diagnosis(es): The primary encounter diagnosis was Fall, subsequent encounter. Diagnoses of Acute pain of left knee, Effusion of left knee, and History of total knee replacement, left were also pertinent to this visit. has a past medical history of Anemia, pernicious; Arthritis; Cataract; High blood pressure; Hyperlipidemia; Hypothyroidism; Macular degeneration; Melanoma ([address] Nocturia; Urinary incontinence; and Vertigo. has a past surgical history that includes joint replacement (); Inguinal hernia repair (); Cataract removal (); malignant skin lesion excision (); Endoscopy, colon, diagnostic (16); percutaneous balloon valvuloplasty (2016); and Cardioversion (2017).     Restrictions  Restrictions/Precautions  Restrictions/Precautions: Fall Risk, Weight Bearing  Lower Extremity Weight Bearing Restrictions  Left Lower Extremity Weight Bearing: Non Weight Bearing  Position Activity Restriction  Other position/activity restrictions: Knee Immobilizer LLE; NWB  Vision/Hearing  Vision: Impaired  Vision Exceptions: Wears glasses at all times  Hearing: Within functional limits     Subjective  General  Chart Reviewed: comes [place] and friday; staff assist with weekend meds )  ADL Assistance: Needs assistance (assist with showering from family )  Homemaking Responsibilities:  (family completes laundry; meals at dining ewing for dinner (breakfast managed by pt) )  Ambulation Assistance: Independent (RW; w/c to dining halls with assist from staff)  Transfer Assistance: Independent  Active : No  Patient's  Info: Children drive  Additional Comments: Daughter and nurse aide from [place] assist with PLOF to confirm   Cognition   Cognition  Overall Cognitive Status: WFL    Objective          AROM RLE (degrees)  RLE AROM: WFL  PROM LLE (degrees)  LLE General PROM: knee immobilizer in place; [facility] for hip and ankle  Strength RLE  Comment: functionally poor  Strength LLE  Comment: n/t     Sensation  Overall Sensation Status: WFL  Bed mobility  Rolling to Left: Maximum assistance (1-2)  Rolling to Right: Maximum assistance (1-2)  Transfers  Bed to Chair: Dependent/Total (with maxi move)  Comment: did not attempt to try and stand this session                 Assessment   Body structures, Functions, Activity limitations: Decreased functional mobility   Assessment: pt is a 79 yo female who fell at her Ind Living apt and sustained Left posterior tibial periprosthetic fx with hemarthrosis; pt currently is NWB LLE with knee immobilizer; she is a high fall risk and will need extensive therapy at discharge due to deficits with bed mob and transfers   Prognosis: Fair  Decision Making: High Complexity  Patient Education: role and purpose of PT; had long discussion with pt's daughter and aide and then again with her son regarding need for continued therapy;  family had many questions regarding IP rehab vs SNF and voiced concerns over previous incident they had with SNF at [facility]  Barriers to Learning: forgetful  REQUIRES PT FOLLOW UP: Yes  Activity Tolerance  Activity Tolerance: Patient limited by endurance         Plan   Plan  Times per

## 2018-11-15 NOTE — H&P
ZITHROMAX, AMOXICILLIN, ASPIRIN. CURRENT MEDICATIONS:  See the reconciliation sheet. FAMILY HISTORY:  Noncontributory. SOCIAL HISTORY:  The patient is a . Has children, grandchildren. Lives in assisted facility, [facility] nursing home. REVIEW OF SYSTEMS:  No headache. No visual symptom. No swallowing  problem. No chest pain. No pleuritic pain. No cough. Had left knee  pain. PHYSICAL EXAMINATION:  VITAL SIGNS:  The patient's blood pressure was 178/77, followup blood  pressure was 147/63; respiratory rate was 14, pulse 67, temperature  98.3. The patient weighed 149 pounds. Saturation was 95% on room air. GENERAL:  The patient is alert and oriented, frail, elderly. SKIN:  Lots of bruises on the leg. HEENT:  Head:  Normocephalic, atraumatic. Pupils are reactive to light  and accommodation. Ears, Nose and Throat: Within normal limits. Mucous membranes are moist.  NECK:  Supple. No JVD. No lymphadenopathy. No thyromegaly. LUNGS:  Clear bilaterally. HEART:  S1, S2. Irregular rhythm. ABDOMEN:  Soft. Bowel sounds present. No mass. No hepatosplenomegaly. EXTREMITIES:  Bruises on both lower extremities. The left knee is  status post knee replacement. Some fluid present. Tender to pressure  and flexion. On the right knee, there is osteoarthritis noted. There  is maybe trace ankle edema on both sides. LABORATORY STUDIES:  WBC count was 8.9, hemoglobin 11.5, hematocrit  34.4, platelet count 728. Sodium 135, potassium 3.9, chloride 98, CO2  of 25, BUN 24, creatinine 1.0, glucose 109. ProBNP was 1351. LFTs were  normal.  Urine was negative. INR was 1.94. CT scan of the left knee showed metallic artifact. Severe osteopenia. Subtle nondisplaced fracture. Large lipohemarthrosis.     ASSESSMENT:  Fall, left total knee replacement, suspect a nondisplaced  fracture on the left knee, hemarthrosis, history of paroxysmal AFib,  hypertension, degenerative joint disease of the

## 2018-11-15 NOTE — CONSULTS
Clinical Pharmacy Note  Warfarin Consult    Ericka Fischer is a 80 y.o. female receiving warfarin managed by pharmacy. Patient being bridged with none. Warfarin Indication: Afib  Target INR range: 2-3   Dose prior to admission: 5mg daily except 2.5mg Renetta Tijerina Thur    Current warfarin drug-drug interactions: Amiodarone (home med)    Recent Labs      11/14/18   0421  11/14/18   2237  11/15/18   0717   HGB  11.5*   --    --    HCT  34.4*   --    --    INR   --   1.94*  2.33*       Assessment/Plan:    Warfarin 2.5 mg tonight. Confirmed with Celia (ortho) ok to dose. Daily PT/INR until stable within therapeutic range. Thank you for the consult. Will continue to follow.   Parviz Marquez RPh 11/15/2018 10:51 AM

## 2018-11-15 NOTE — PROGRESS NOTES
Nutrition Assessment    Type and Reason for Visit: Initial, Positive Nutrition Screen (MST 2)    Nutrition Recommendations:   Continue diet free of therapeutic restrictions   Ensure BID  Will monitor nutritional adequacy, nutrition-related labs, weights, BMs, and clinical progress     Nutrition Assessment: Pt at nutrition risk d/t low appetite at baseline, decreased more recently. Further nutrition risk possible d/t increased needs for healing, advanced age, and ongoing anorexia. Pt will accept supplement, will order BID. Malnutrition Assessment:  · Malnutrition Status: At risk for malnutrition  · Context: Chronic illness  · Findings of the 6 clinical characteristics of malnutrition (Minimum of 2 out of 6 clinical characteristics is required to make the diagnosis of moderate or severe Protein Calorie Malnutrition based on AND/ASPEN Guidelines):  1. Energy Intake-Less than 75% of estimated energy requirement for greater than or equal to 3 months,      2. Weight Loss-No significant weight loss,    3. Fat Loss-No significant subcutaneous fat loss,  (pt with advanced age)  3. Muscle Loss-No significant muscle mass loss,   (pt with advanced age)  11. Fluid Accumulation-No significant fluid accumulation,    6.  Strength-Not measured    Nutrition Risk Level:  Moderate    Nutrient Needs:  · Estimated Daily Total Kcal: 0387-2707 kcal (25-30 kcal/kg ABW)  · Estimated Daily Protein (g): 82-88 gm (1.2-1.3 gm/kg ABW)  · Estimated Daily Total Fluid (ml/day): 1 ml/kcal     Nutrition Diagnosis:   · Problem: Inadequate oral intake  · Etiology: related to Insufficient energy/nutrient consumption     Signs and symptoms:  as evidenced by Diet history of poor intake    Objective Information:  · Nutrition-Focused Physical Findings: some muscle mass and fat store wasting seen possibly d/t advanced age   · Wound Type: None  · Current Nutrition Therapies:  · Oral Diet Orders: General   · Oral Diet intake: 51-75%  · Oral

## 2018-11-15 NOTE — CARE COORDINATION
Jay intern spoke with pt's daughter, Kaiser Bobby [phone], present in room. Pt's daughter reports that pt is from [address] [place] [address]. Pt's daughter prefers ARU for rehab pending PT/OT assessment. Jay intern provided pt's daughter with snf list if ARU cannot accept as pt's daughter does not want skilled stay at [address] [place] [address]. Jay intern spoke with Lacey Cuellar in [place] [address] who is reviewing pt. Jay intern to follow up with Kaiser Bobby via phone if no family is in the room with a decision about ARU. Electronically signed by Lucio Leroy on 11/15/2018 at 2:17 PM    2:53 PM  Lacey Cuellar called and stated that ARU cannot accept pt.  Jay intern informed pt's daughter who will look over snf list.    Electronically signed by Lucio Leroy on 11/15/2018 at 2:54 PM  Electronically signed by Moncho Graves on 11/15/2018 at 3:41 PM

## 2018-11-15 NOTE — CONSULTS
[facility] Orthopedic Surgery  Consult Note    This patient is seen in consultation at the request of Je MARSH  Reason for Consult:  Left k nee pain    CHIEF COMPLAINT:  Left knee pain    History Obtained From:  patient, electronic medical record    HISTORY OF PRESENT ILLNESS:    The patient is a 80 y.o. female who presents with left knee pain. She is alert and oriented to place and self. Some poor recall of age, thought she was 61. She lives in independent living at [place]. She reports she recently fell and had left knee pain. She is post left TKA per Dr Woodrow Corado in 2008. Pain is described in left knee and with the intensity of mild at rest and severe with movement. Pain is described as aching, burning, shooting. Discomfort is intermittent. She is on Coumadin for hx cardiac valve. She denies other complaints at this time. Family at bedside. Past Medical History:        Diagnosis Date    Anemia, pernicious     Arthritis     both hand (R worse than the L)    Cataract     both eyes    High blood pressure     previous history but off of medication for 1 year.     Hyperlipidemia     on statin    Hypothyroidism     oral supplementation    Macular degeneration     injection to left eye every 6 weeks    Melanoma ([address] 2014    lesions removed from nose and forehead    Nocturia     Urinary incontinence 2006    Vertigo 2006    treated with meclizine       Past Surgical History:        Procedure Laterality Date    CARDIOVERSION  01/2017    CATARACT REMOVAL  2010    bilateral removal    ENDOSCOPY, COLON, DIAGNOSTIC  5/23/16    Esophagogastroduodenoscopy with biopsy    INGUINAL HERNIA REPAIR  2007    JOINT REPLACEMENT  2008    left  knee     MALIGNANT SKIN LESION EXCISION  2010    nose and forehead    PERCUTANEOUS BALLOON VALVULOPLASTY  5/20/2016    BAV       Social History   Substance Use Topics    Smoking status: Former Smoker     Quit date: 1/1/1950    Smokeless tobacco: Never Used      Comment: social, Component Value Date    WBC 8.9 11/14/2018    RBC 3.54 11/14/2018    HGB 11.5 11/14/2018    HCT 34.4 11/14/2018    MCV 97.3 11/14/2018    MCH 32.5 11/14/2018    MCHC 33.4 11/14/2018    RDW 16.6 11/14/2018     11/14/2018    MPV 7.0 11/14/2018     WBC:    Lab Results   Component Value Date    WBC 8.9 11/14/2018     PT/INR:    Lab Results   Component Value Date    PROTIME 26.6 11/15/2018    INR 2.33 11/15/2018     PTT:    Lab Results   Component Value Date    APTT 43.5 12/28/2017   [APTT  Radiology Review:    Narrative   EXAMINATION:   CT OF THE LEFT KNEE WITHOUT CONTRAST 11/14/2018 8:56 pm       TECHNIQUE:   CT of the left knee was performed without the administration of intravenous   contrast.  Multiplanar reformatted images are provided for review. Dose   modulation, iterative reconstruction, and/or weight based adjustment of the   mA/kV was utilized to reduce the radiation dose to as low as reasonably   achievable.       COMPARISON:   Left knee radiograph November 14, 2018       HISTORY   ORDERING SYSTEM PROVIDED HISTORY: pain, recent fall, history of TKR   TECHNOLOGIST PROVIDED HISTORY:   Ordering Physician Provided Reason for Exam: fall yesterday, increasing pain   Acuity: Acute   Type of Exam: Initial   Mechanism of Injury: fall       FINDINGS:   Bones: The patient is status post left total knee arthroplasty.  Metallic   streak artifact does limit evaluation of the adjacent osseous structures. The bones are severely osteopenic.  There is subtle cortical abnormality   along the posterior aspect of the tibia suggesting nondisplaced fracture. This corresponds to large lipohemarthrosis.       Soft Tissue:  Mild subcutaneous edema about the knee.  Atherosclerotic   vascular calcifications are noted.       Joint:  Status post left total knee arthroplasty with cemented components. The hardware appears intact and appropriately position. Luis Grise is a large   lipohemarthrosis.            Impression   Metallic

## 2018-11-16 LAB
INR BLD: 2.78 (ref 0.86–1.14)
ORGANISM: ABNORMAL
PROTHROMBIN TIME: 31.7 SEC (ref 9.8–13)
URINE CULTURE, ROUTINE: ABNORMAL
URINE CULTURE, ROUTINE: ABNORMAL

## 2018-11-16 PROCEDURE — 97530 THERAPEUTIC ACTIVITIES: CPT

## 2018-11-16 PROCEDURE — 6370000000 HC RX 637 (ALT 250 FOR IP): Performed by: NURSE PRACTITIONER

## 2018-11-16 PROCEDURE — 97535 SELF CARE MNGMENT TRAINING: CPT

## 2018-11-16 PROCEDURE — 6370000000 HC RX 637 (ALT 250 FOR IP): Performed by: SPECIALIST

## 2018-11-16 PROCEDURE — 85610 PROTHROMBIN TIME: CPT

## 2018-11-16 PROCEDURE — 1200000000 HC SEMI PRIVATE

## 2018-11-16 PROCEDURE — 36415 COLL VENOUS BLD VENIPUNCTURE: CPT

## 2018-11-16 RX ORDER — WARFARIN SODIUM 1 MG/1
1 TABLET ORAL
Status: COMPLETED | OUTPATIENT
Start: 2018-11-16 | End: 2018-11-16

## 2018-11-16 RX ORDER — HYDROCODONE BITARTRATE AND ACETAMINOPHEN 5; 325 MG/1; MG/1
1 TABLET ORAL EVERY 4 HOURS PRN
Qty: 30 TABLET | Refills: 0 | Status: SHIPPED | OUTPATIENT
Start: 2018-11-16 | End: 2018-11-23

## 2018-11-16 RX ADMIN — WARFARIN SODIUM 1 MG: 1 TABLET ORAL at 18:34

## 2018-11-16 RX ADMIN — ACETAMINOPHEN 650 MG: 325 TABLET, FILM COATED ORAL at 22:09

## 2018-11-16 RX ADMIN — ACETAMINOPHEN 650 MG: 325 TABLET, FILM COATED ORAL at 13:50

## 2018-11-16 RX ADMIN — LEVOTHYROXINE SODIUM 100 MCG: 100 TABLET ORAL at 06:25

## 2018-11-16 RX ADMIN — SENNOSIDES AND DOCUSATE SODIUM 1 TABLET: 8.6; 5 TABLET ORAL at 10:21

## 2018-11-16 RX ADMIN — ACETAMINOPHEN 650 MG: 325 TABLET, FILM COATED ORAL at 06:25

## 2018-11-16 RX ADMIN — DIAZEPAM 2 MG: 2 TABLET ORAL at 22:09

## 2018-11-16 RX ADMIN — AMIODARONE HYDROCHLORIDE 100 MG: 200 TABLET ORAL at 10:21

## 2018-11-16 RX ADMIN — PANTOPRAZOLE SODIUM 40 MG: 40 TABLET, DELAYED RELEASE ORAL at 06:25

## 2018-11-16 ASSESSMENT — PAIN DESCRIPTION - DESCRIPTORS
DESCRIPTORS: ACHING
DESCRIPTORS: ACHING
DESCRIPTORS: THROBBING;ACHING

## 2018-11-16 ASSESSMENT — PAIN SCALES - GENERAL
PAINLEVEL_OUTOF10: 0
PAINLEVEL_OUTOF10: 0
PAINLEVEL_OUTOF10: 4
PAINLEVEL_OUTOF10: 6
PAINLEVEL_OUTOF10: 0
PAINLEVEL_OUTOF10: 1
PAINLEVEL_OUTOF10: 0
PAINLEVEL_OUTOF10: 0

## 2018-11-16 ASSESSMENT — PAIN DESCRIPTION - PAIN TYPE
TYPE: ACUTE PAIN
TYPE: ACUTE PAIN;CHRONIC PAIN
TYPE: ACUTE PAIN

## 2018-11-16 ASSESSMENT — PAIN DESCRIPTION - ORIENTATION
ORIENTATION: LEFT

## 2018-11-16 ASSESSMENT — PAIN DESCRIPTION - FREQUENCY
FREQUENCY: CONTINUOUS

## 2018-11-16 ASSESSMENT — PAIN DESCRIPTION - ONSET
ONSET: ON-GOING

## 2018-11-16 ASSESSMENT — PAIN DESCRIPTION - LOCATION
LOCATION: KNEE

## 2018-11-16 ASSESSMENT — PAIN DESCRIPTION - PROGRESSION
CLINICAL_PROGRESSION: GRADUALLY IMPROVING
CLINICAL_PROGRESSION: GRADUALLY IMPROVING

## 2018-11-16 NOTE — PLAN OF CARE
Problem: Falls - Risk of:  Goal: Will remain free from falls  Will remain free from falls   Outcome: Ongoing  Fall risk assessment completed. Fall precautions in place. Call light within reach. Pt educated on calling for assistance before getting up. Walkway free of clutter. Will continue to monitor. Goal: Absence of physical injury  Absence of physical injury   Outcome: Ongoing  Fall risk assessment completed. Fall precautions in place. Call light within reach. Pt educated on calling for assistance before getting up. Walkway free of clutter. Will continue to monitor. Problem: Risk for Impaired Skin Integrity  Goal: Tissue integrity - skin and mucous membranes  Structural intactness and normal physiological function of skin and  mucous membranes. Outcome: Ongoing  Skin assessment completed every shift. Pt assessed for incontinence, appropriate barrier cream applied prn. Pt encouraged to turn/rotate every 2 hours. Assistance provided if pt unable to do so themselves. Problem: Pain:  Goal: Pain level will decrease  Pain level will decrease   Outcome: Ongoing  Pain/discomfort being managed with PRN analgesics per MD orders. Pt able to express presence and absence of pain and rate pain appropriately using numerical scale. Goal: Control of acute pain  Control of acute pain   Outcome: Ongoing  Pain/discomfort being managed with PRN analgesics per MD orders. Pt able to express presence and absence of pain and rate pain appropriately using numerical scale. Goal: Control of chronic pain  Control of chronic pain   Outcome: Ongoing  Pain/discomfort being managed with PRN analgesics per MD orders. Pt able to express presence and absence of pain and rate pain appropriately using numerical scale.

## 2018-11-16 NOTE — CARE COORDINATION
[facility] has a bed for patient. Patient's daughter aware. [facility] Blood at [facility] is on for the weekend [phone].     Electronically signed by Karla Reyes on 11/16/2018 at 2:26 PM

## 2018-11-16 NOTE — PROGRESS NOTES
lives in independent living at [facility]. She reports she recently fell and had left knee pain. She is post left TKA per Dr Morenita Hernandez in 2008. Pain is described in left knee and with the intensity of mild at rest and severe with movement. Pain is described as aching, burning, shooting. Discomfort is intermittent. She is on Coumadin for hx cardiac valve. She denies other complaints at this time. Pt diagnosed with Left posterior tibial periprosthetic fx with hemarthrosis, plan immobilizer and NWB, nonoperative tx. Pt to follow up with Dr Morenita Hernandez in 4 weeks  Response To Previous Treatment: Patient with no complaints from previous session. ;Patient unable to report, no changes reported from family or staff  Follows Commands: Within Functional Limits  Subjective  Subjective: alert oriented to self - does not remember what weight bearing restrictions she has  Pain Screening  Patient Currently in Pain: Yes (Simultaneous filing.  User may not have seen previous data.)  Pain Assessment  Pain Assessment:  (not rated)  Social/Functional History  Social/Functional History  Lives With: Alone  Type of Home: Apartment ([facility] )  Home Layout: One level  Home Access: Level entry  Bathroom Shower/Tub: Walk-in shower  Bathroom Equipment: Shower chair  Home Equipment: Rolling walker  Receives Help From:  (daughter comes [place]; YINA comes [place] and friday; staff assist with weekend meds )  ADL Assistance: Needs assistance (assist with showering from family )  Homemaking Responsibilities:  (family completes laundry; meals at dining [employer]wing for dinner (breakfast managed by pt) )  Ambulation Assistance: Independent (RW; w/c to dining halls with assist from staff)  Transfer Assistance: Independent  Active : No  Patient's  Info: Children drive  Additional Comments: Daughter and nurse aide from [facility] assist with PLOF to confirm   Objective  Bed mobility  Supine to Sit: Moderate assistance  Sit to Supine: Unable to assess

## 2018-11-17 VITALS
OXYGEN SATURATION: 97 % | DIASTOLIC BLOOD PRESSURE: 62 MMHG | BODY MASS INDEX: 24.21 KG/M2 | HEART RATE: 58 BPM | WEIGHT: 145.28 LBS | TEMPERATURE: 98.4 F | HEIGHT: 65 IN | SYSTOLIC BLOOD PRESSURE: 147 MMHG | RESPIRATION RATE: 18 BRPM

## 2018-11-17 LAB
INR BLD: 2.96 (ref 0.86–1.14)
PROTHROMBIN TIME: 33.7 SEC (ref 9.8–13)

## 2018-11-17 PROCEDURE — 94760 N-INVAS EAR/PLS OXIMETRY 1: CPT

## 2018-11-17 PROCEDURE — 36415 COLL VENOUS BLD VENIPUNCTURE: CPT

## 2018-11-17 PROCEDURE — 6370000000 HC RX 637 (ALT 250 FOR IP): Performed by: SPECIALIST

## 2018-11-17 PROCEDURE — 85610 PROTHROMBIN TIME: CPT

## 2018-11-17 PROCEDURE — 6370000000 HC RX 637 (ALT 250 FOR IP): Performed by: NURSE PRACTITIONER

## 2018-11-17 PROCEDURE — 99238 HOSP IP/OBS DSCHRG MGMT 30/<: CPT | Performed by: INTERNAL MEDICINE

## 2018-11-17 RX ORDER — WARFARIN SODIUM 1 MG/1
1 TABLET ORAL DAILY
Qty: 30 TABLET | Refills: 3 | Status: SHIPPED | OUTPATIENT
Start: 2018-11-17 | End: 2018-11-26

## 2018-11-17 RX ADMIN — PANTOPRAZOLE SODIUM 40 MG: 40 TABLET, DELAYED RELEASE ORAL at 05:59

## 2018-11-17 RX ADMIN — ACETAMINOPHEN 650 MG: 325 TABLET, FILM COATED ORAL at 05:59

## 2018-11-17 RX ADMIN — AMIODARONE HYDROCHLORIDE 100 MG: 200 TABLET ORAL at 09:01

## 2018-11-17 RX ADMIN — SENNOSIDES AND DOCUSATE SODIUM 1 TABLET: 8.6; 5 TABLET ORAL at 09:01

## 2018-11-17 RX ADMIN — LEVOTHYROXINE SODIUM 100 MCG: 100 TABLET ORAL at 05:59

## 2018-11-17 RX ADMIN — ACETAMINOPHEN 650 MG: 325 TABLET, FILM COATED ORAL at 14:18

## 2018-11-17 ASSESSMENT — PAIN SCALES - GENERAL
PAINLEVEL_OUTOF10: 5
PAINLEVEL_OUTOF10: 0
PAINLEVEL_OUTOF10: 0
PAINLEVEL_OUTOF10: 2
PAINLEVEL_OUTOF10: 0

## 2018-11-17 ASSESSMENT — PAIN DESCRIPTION - ONSET: ONSET: ON-GOING

## 2018-11-17 ASSESSMENT — PAIN DESCRIPTION - ORIENTATION: ORIENTATION: LEFT

## 2018-11-17 ASSESSMENT — PAIN DESCRIPTION - PROGRESSION: CLINICAL_PROGRESSION: NOT CHANGED

## 2018-11-17 ASSESSMENT — PAIN DESCRIPTION - FREQUENCY: FREQUENCY: CONTINUOUS

## 2018-11-17 ASSESSMENT — PAIN DESCRIPTION - DESCRIPTORS: DESCRIPTORS: THROBBING

## 2018-11-17 ASSESSMENT — PAIN DESCRIPTION - LOCATION: LOCATION: KNEE

## 2018-11-17 ASSESSMENT — PAIN DESCRIPTION - PAIN TYPE: TYPE: ACUTE PAIN

## 2018-11-17 NOTE — PROGRESS NOTES
Patient assisted from chair back to bed using maximove. Positioned for comfort. Incontinent of urine. Perirectal care performed and new attends placed. HS medications given without difficulty. Patient still a little confused about \"where she is and what she is supposed to be doing. \" patient reoriented easily. telesitter remains at bedside. Bed in lowest position, wheels locked, bed exit alarm in place, call light within reach. Able to make needs known. Pt educated to use call light when needing to get up, and pt utilizes call light to make needs known. Will continue to monitor.

## 2018-11-17 NOTE — CARE COORDINATION
SOCIAL WORK DISCHARGE SUMMARY:      DISCHARGE DATE:                 Saturday, 11-      [facility]               REPORT NUMBER:       [phone]             FAX NUMBER:                [phone]        TRANSPORTATION:                [facility]   [phone]             TIME:                              5:30 pm        INSURANCE PRECERT OBTAINED:       None needed    HENS/PASAAR COMPLETED:                  Yes. Dgiwona Bolivar called to inform.      [place], [place]     Case Management   [phone]    11/17/2018  11:20 AM

## 2018-11-19 ENCOUNTER — ANTI-COAG VISIT (OUTPATIENT)
Dept: PHARMACY | Age: 83
End: 2018-11-19

## 2018-11-19 DIAGNOSIS — I48.19 PERSISTENT ATRIAL FIBRILLATION (HCC): ICD-10-CM

## 2018-11-19 LAB — INR BLD: 1.2

## 2018-11-19 NOTE — PROGRESS NOTES
Warfarin 3mg today Warfarin 2mg tomorrow. Recheck INR 11/21/18     She was recently hospitalized with decreased appetite. Dose decreased at discharge from her regular maintenance dose of 5mg daily EXCEPT 2.5mg every Sun, Tues and Thurs to 1mg daily. She was discharged 11/17/18. Her INR dropped to 1.2 today. I will increase her warfarin dose today to 3mg today and 2mg tomorrow and recheck her INR 11/21/18.

## 2018-11-21 ENCOUNTER — TELEPHONE (OUTPATIENT)
Dept: PHARMACY | Age: 83
End: 2018-11-21

## 2018-11-21 NOTE — TELEPHONE ENCOUNTER
Spoke to [employer] at [facility] today patient did not get lab draw they have placed an order to have it done on 11/22. We will be closed this day and follow up on Friday RN instructed to give 2 mg of Warfarin daily ok to have MD at [place] dose tomorrows result if needed.

## 2018-11-22 LAB — INTERNATIONAL NORMALIZATION RATIO, POC: 1.3

## 2018-11-23 ENCOUNTER — ANTI-COAG VISIT (OUTPATIENT)
Dept: PHARMACY | Age: 83
End: 2018-11-23

## 2018-11-23 DIAGNOSIS — I48.19 PERSISTENT ATRIAL FIBRILLATION (HCC): ICD-10-CM

## 2018-11-23 NOTE — PROGRESS NOTES
Renee HARDY with [address] [place] [address] center called to let us know patients inr results from yesterday 11/22  was 1.3. The physician at [address] [place] gave instructions to give 5 mg on 11/22 then continue 2 mg daily recheck inr on 11/26.

## 2018-11-26 ENCOUNTER — ANTI-COAG VISIT (OUTPATIENT)
Dept: PHARMACY | Age: 83
End: 2018-11-26

## 2018-11-26 DIAGNOSIS — I48.19 PERSISTENT ATRIAL FIBRILLATION (HCC): ICD-10-CM

## 2018-11-26 LAB — INTERNATIONAL NORMALIZATION RATIO, POC: 1.1

## 2018-11-26 RX ORDER — WARFARIN SODIUM 1 MG/1
5 TABLET ORAL DAILY
COMMUNITY
End: 2018-12-13 | Stop reason: DRUGHIGH

## 2018-11-27 ENCOUNTER — OFFICE VISIT (OUTPATIENT)
Dept: ORTHOPEDIC SURGERY | Age: 83
End: 2018-11-27
Payer: MEDICARE

## 2018-11-27 VITALS
SYSTOLIC BLOOD PRESSURE: 130 MMHG | HEART RATE: 64 BPM | BODY MASS INDEX: 22.82 KG/M2 | DIASTOLIC BLOOD PRESSURE: 65 MMHG | WEIGHT: 137 LBS | TEMPERATURE: 97 F | HEIGHT: 65 IN

## 2018-11-27 DIAGNOSIS — M17.11 ARTHRITIS OF RIGHT KNEE: ICD-10-CM

## 2018-11-27 DIAGNOSIS — S80.02XA CONTUSION OF LEFT KNEE, INITIAL ENCOUNTER: ICD-10-CM

## 2018-11-27 DIAGNOSIS — M25.562 LEFT KNEE PAIN, UNSPECIFIED CHRONICITY: Primary | ICD-10-CM

## 2018-11-27 DIAGNOSIS — M25.561 RIGHT KNEE PAIN, UNSPECIFIED CHRONICITY: ICD-10-CM

## 2018-11-27 DIAGNOSIS — Z96.652 HISTORY OF ARTHROPLASTY OF LEFT KNEE: ICD-10-CM

## 2018-11-27 LAB
EKG ATRIAL RATE: 72 BPM
EKG DIAGNOSIS: NORMAL
EKG P AXIS: 92 DEGREES
EKG P-R INTERVAL: 204 MS
EKG Q-T INTERVAL: 450 MS
EKG QRS DURATION: 130 MS
EKG QTC CALCULATION (BAZETT): 492 MS
EKG R AXIS: -40 DEGREES
EKG T AXIS: 64 DEGREES
EKG VENTRICULAR RATE: 72 BPM

## 2018-11-27 PROCEDURE — 1111F DSCHRG MED/CURRENT MED MERGE: CPT | Performed by: PHYSICIAN ASSISTANT

## 2018-11-27 PROCEDURE — 1036F TOBACCO NON-USER: CPT | Performed by: PHYSICIAN ASSISTANT

## 2018-11-27 PROCEDURE — 1090F PRES/ABSN URINE INCON ASSESS: CPT | Performed by: PHYSICIAN ASSISTANT

## 2018-11-27 PROCEDURE — 4040F PNEUMOC VAC/ADMIN/RCVD: CPT | Performed by: PHYSICIAN ASSISTANT

## 2018-11-27 PROCEDURE — 1123F ACP DISCUSS/DSCN MKR DOCD: CPT | Performed by: PHYSICIAN ASSISTANT

## 2018-11-27 PROCEDURE — 99213 OFFICE O/P EST LOW 20 MIN: CPT | Performed by: PHYSICIAN ASSISTANT

## 2018-11-27 PROCEDURE — G8420 CALC BMI NORM PARAMETERS: HCPCS | Performed by: PHYSICIAN ASSISTANT

## 2018-11-27 PROCEDURE — 1101F PT FALLS ASSESS-DOCD LE1/YR: CPT | Performed by: PHYSICIAN ASSISTANT

## 2018-11-27 PROCEDURE — G8427 DOCREV CUR MEDS BY ELIG CLIN: HCPCS | Performed by: PHYSICIAN ASSISTANT

## 2018-11-27 PROCEDURE — G8484 FLU IMMUNIZE NO ADMIN: HCPCS | Performed by: PHYSICIAN ASSISTANT

## 2018-11-29 ENCOUNTER — ANTI-COAG VISIT (OUTPATIENT)
Dept: PHARMACY | Age: 83
End: 2018-11-29

## 2018-11-29 DIAGNOSIS — I48.19 PERSISTENT ATRIAL FIBRILLATION (HCC): ICD-10-CM

## 2018-11-29 LAB — INTERNATIONAL NORMALIZATION RATIO, POC: 1.3

## 2018-12-03 LAB
INTERNATIONAL NORMALIZATION RATIO, POC: 2.6
INTERNATIONAL NORMALIZATION RATIO, POC: 2.6

## 2018-12-04 ENCOUNTER — ANTI-COAG VISIT (OUTPATIENT)
Dept: PHARMACY | Age: 83
End: 2018-12-04
Payer: MEDICARE

## 2018-12-04 DIAGNOSIS — I48.19 PERSISTENT ATRIAL FIBRILLATION (HCC): ICD-10-CM

## 2018-12-06 ENCOUNTER — ANTI-COAG VISIT (OUTPATIENT)
Dept: PHARMACY | Age: 83
End: 2018-12-06
Payer: MEDICARE

## 2018-12-06 DIAGNOSIS — I48.19 PERSISTENT ATRIAL FIBRILLATION (HCC): ICD-10-CM

## 2018-12-06 LAB — INTERNATIONAL NORMALIZATION RATIO, POC: 2.7

## 2018-12-10 ENCOUNTER — ANTI-COAG VISIT (OUTPATIENT)
Dept: PHARMACY | Age: 83
End: 2018-12-10
Payer: MEDICARE

## 2018-12-10 DIAGNOSIS — I48.19 PERSISTENT ATRIAL FIBRILLATION (HCC): ICD-10-CM

## 2018-12-10 LAB — INTERNATIONAL NORMALIZATION RATIO, POC: 1.3

## 2018-12-13 ENCOUNTER — ANTI-COAG VISIT (OUTPATIENT)
Dept: PHARMACY | Age: 83
End: 2018-12-13
Payer: MEDICARE

## 2018-12-13 DIAGNOSIS — I48.19 PERSISTENT ATRIAL FIBRILLATION (HCC): ICD-10-CM

## 2018-12-13 LAB — INTERNATIONAL NORMALIZATION RATIO, POC: 2.2

## 2018-12-13 RX ORDER — WARFARIN SODIUM 5 MG/1
5 TABLET ORAL DAILY
COMMUNITY
End: 2019-02-05

## 2018-12-17 ENCOUNTER — ANTI-COAG VISIT (OUTPATIENT)
Dept: PHARMACY | Age: 83
End: 2018-12-17
Payer: MEDICARE

## 2018-12-17 DIAGNOSIS — I48.19 PERSISTENT ATRIAL FIBRILLATION (HCC): ICD-10-CM

## 2018-12-17 LAB — INTERNATIONAL NORMALIZATION RATIO, POC: 4.3

## 2018-12-17 NOTE — PROGRESS NOTES
Ms. Cristal Cruz is a 80 y.o.  female with history of Afib who presents today for anticoagulation monitoring and adjustment. Patient verifies current dosing regimen. Lab Results   Component Value Date    INR 4.3 12/17/2018    INR 2.2 12/13/2018    INR 1.3 12/10/2018     Patient continues ensure qod and still eating some greens. Coumadin dose: Hold 2 days then  Continue Warfarin 5mg daily except 7.5mg every Monday. Instructions to Torrey Darnell RN at [employer] [facility]    . Recheck INR in 1 weeks. Patient reminded to call the Anticoagulation Clinic with any signs or symptoms of bleeding or with any medication changes. Patient given instructions utilizing the teach back method. After visit summary printed and reviewed with patient.     Medications reviewed and Warfarin dose updated

## 2018-12-20 ENCOUNTER — ANTI-COAG VISIT (OUTPATIENT)
Dept: PHARMACY | Age: 83
End: 2018-12-20

## 2018-12-20 DIAGNOSIS — I48.19 PERSISTENT ATRIAL FIBRILLATION (HCC): ICD-10-CM

## 2018-12-20 LAB — INTERNATIONAL NORMALIZATION RATIO, POC: 1.8

## 2018-12-24 ENCOUNTER — ANTI-COAG VISIT (OUTPATIENT)
Dept: PHARMACY | Age: 83
End: 2018-12-24
Payer: MEDICARE

## 2018-12-24 DIAGNOSIS — I48.19 PERSISTENT ATRIAL FIBRILLATION (HCC): ICD-10-CM

## 2018-12-24 LAB — INR BLD: 2.8

## 2018-12-26 ENCOUNTER — ANTI-COAG VISIT (OUTPATIENT)
Dept: PHARMACY | Age: 83
End: 2018-12-26
Payer: MEDICARE

## 2018-12-26 DIAGNOSIS — I48.19 PERSISTENT ATRIAL FIBRILLATION (HCC): ICD-10-CM

## 2018-12-26 LAB — INTERNATIONAL NORMALIZATION RATIO, POC: 3.5

## 2019-01-02 ENCOUNTER — ANTI-COAG VISIT (OUTPATIENT)
Dept: PHARMACY | Age: 84
End: 2019-01-02
Payer: MEDICARE

## 2019-01-02 LAB — INTERNATIONAL NORMALIZATION RATIO, POC: 4

## 2019-01-03 ENCOUNTER — OFFICE VISIT (OUTPATIENT)
Dept: ORTHOPEDIC SURGERY | Age: 84
End: 2019-01-03
Payer: MEDICARE

## 2019-01-03 VITALS
SYSTOLIC BLOOD PRESSURE: 120 MMHG | BODY MASS INDEX: 23.49 KG/M2 | DIASTOLIC BLOOD PRESSURE: 61 MMHG | HEART RATE: 57 BPM | HEIGHT: 65 IN | WEIGHT: 141 LBS | TEMPERATURE: 98.4 F

## 2019-01-03 DIAGNOSIS — M25.562 LEFT KNEE PAIN, UNSPECIFIED CHRONICITY: ICD-10-CM

## 2019-01-03 DIAGNOSIS — Z96.652 HISTORY OF ARTHROPLASTY OF LEFT KNEE: Primary | ICD-10-CM

## 2019-01-03 DIAGNOSIS — M25.561 RIGHT KNEE PAIN, UNSPECIFIED CHRONICITY: ICD-10-CM

## 2019-01-03 PROCEDURE — 1101F PT FALLS ASSESS-DOCD LE1/YR: CPT | Performed by: PHYSICIAN ASSISTANT

## 2019-01-03 PROCEDURE — 1036F TOBACCO NON-USER: CPT | Performed by: PHYSICIAN ASSISTANT

## 2019-01-03 PROCEDURE — G8420 CALC BMI NORM PARAMETERS: HCPCS | Performed by: PHYSICIAN ASSISTANT

## 2019-01-03 PROCEDURE — 1090F PRES/ABSN URINE INCON ASSESS: CPT | Performed by: PHYSICIAN ASSISTANT

## 2019-01-03 PROCEDURE — G8484 FLU IMMUNIZE NO ADMIN: HCPCS | Performed by: PHYSICIAN ASSISTANT

## 2019-01-03 PROCEDURE — 1123F ACP DISCUSS/DSCN MKR DOCD: CPT | Performed by: PHYSICIAN ASSISTANT

## 2019-01-03 PROCEDURE — 4040F PNEUMOC VAC/ADMIN/RCVD: CPT | Performed by: PHYSICIAN ASSISTANT

## 2019-01-03 PROCEDURE — G8428 CUR MEDS NOT DOCUMENT: HCPCS | Performed by: PHYSICIAN ASSISTANT

## 2019-01-03 PROCEDURE — 99212 OFFICE O/P EST SF 10 MIN: CPT | Performed by: PHYSICIAN ASSISTANT

## 2019-01-09 ENCOUNTER — ANTI-COAG VISIT (OUTPATIENT)
Dept: PHARMACY | Age: 84
End: 2019-01-09

## 2019-01-09 DIAGNOSIS — I48.19 PERSISTENT ATRIAL FIBRILLATION (HCC): ICD-10-CM

## 2019-01-09 LAB — INR BLD: 3.2

## 2019-01-10 ENCOUNTER — ANTI-COAG VISIT (OUTPATIENT)
Dept: PHARMACY | Age: 84
End: 2019-01-10

## 2019-01-10 DIAGNOSIS — I48.19 PERSISTENT ATRIAL FIBRILLATION (HCC): ICD-10-CM

## 2019-01-10 LAB — INTERNATIONAL NORMALIZATION RATIO, POC: 4.1

## 2019-01-14 ENCOUNTER — ANTI-COAG VISIT (OUTPATIENT)
Dept: PHARMACY | Age: 84
End: 2019-01-14

## 2019-01-14 DIAGNOSIS — I48.19 PERSISTENT ATRIAL FIBRILLATION (HCC): ICD-10-CM

## 2019-01-14 LAB — INTERNATIONAL NORMALIZATION RATIO, POC: 2.1

## 2019-01-17 ENCOUNTER — ANTI-COAG VISIT (OUTPATIENT)
Dept: PHARMACY | Age: 84
End: 2019-01-17

## 2019-01-17 DIAGNOSIS — I48.19 PERSISTENT ATRIAL FIBRILLATION (HCC): ICD-10-CM

## 2019-01-18 ENCOUNTER — ANTI-COAG VISIT (OUTPATIENT)
Dept: PHARMACY | Age: 84
End: 2019-01-18

## 2019-01-18 DIAGNOSIS — I48.19 PERSISTENT ATRIAL FIBRILLATION (HCC): ICD-10-CM

## 2019-01-18 LAB — INTERNATIONAL NORMALIZATION RATIO, POC: 1.8

## 2019-02-02 ENCOUNTER — HOSPITAL ENCOUNTER (EMERGENCY)
Age: 84
Discharge: HOME | End: 2019-02-02
Attending: EMERGENCY MEDICINE
Payer: MEDICARE

## 2019-02-02 ENCOUNTER — APPOINTMENT (OUTPATIENT)
Dept: CT IMAGING | Age: 84
End: 2019-02-02
Payer: MEDICARE

## 2019-02-02 VITALS
WEIGHT: 136.24 LBS | SYSTOLIC BLOOD PRESSURE: 141 MMHG | DIASTOLIC BLOOD PRESSURE: 49 MMHG | TEMPERATURE: 97.8 F | BODY MASS INDEX: 22.67 KG/M2 | OXYGEN SATURATION: 99 % | RESPIRATION RATE: 16 BRPM | HEART RATE: 61 BPM

## 2019-02-02 DIAGNOSIS — S09.90XA INJURY OF HEAD, INITIAL ENCOUNTER: Primary | ICD-10-CM
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
## 2019-02-02 LAB
INR BLD: 2.6 (ref 0.86–1.14)
PROTHROMBIN TIME: 29.6 SEC (ref 9.8–13)

## 2019-02-02 PROCEDURE — 70450 CT HEAD/BRAIN W/O DYE: CPT

## 2019-02-02 PROCEDURE — 72125 CT NECK SPINE W/O DYE: CPT

## 2019-02-02 PROCEDURE — 85610 PROTHROMBIN TIME: CPT

## 2019-02-02 PROCEDURE — 99284 EMERGENCY DEPT VISIT MOD MDM: CPT

## 2019-02-04 ENCOUNTER — TELEPHONE (OUTPATIENT)
Dept: FAMILY MEDICINE CLINIC | Age: 84
End: 2019-02-04

## 2019-02-05 ENCOUNTER — TELEPHONE (OUTPATIENT)
Dept: PHARMACY | Age: 84
End: 2019-02-05

## 2019-02-05 ENCOUNTER — TELEPHONE (OUTPATIENT)
Dept: CARDIOLOGY CLINIC | Age: 84
End: 2019-02-05

## 2019-03-07 ENCOUNTER — OFFICE VISIT (OUTPATIENT)
Dept: CARDIOLOGY CLINIC | Age: 84
End: 2019-03-07
Payer: MEDICARE

## 2019-03-07 VITALS
HEIGHT: 65 IN | HEART RATE: 64 BPM | DIASTOLIC BLOOD PRESSURE: 82 MMHG | SYSTOLIC BLOOD PRESSURE: 124 MMHG | BODY MASS INDEX: 22.66 KG/M2 | WEIGHT: 136 LBS

## 2019-03-07 DIAGNOSIS — Z95.2 S/P TAVR (TRANSCATHETER AORTIC VALVE REPLACEMENT): ICD-10-CM

## 2019-03-07 DIAGNOSIS — I10 ESSENTIAL HYPERTENSION: ICD-10-CM

## 2019-03-07 DIAGNOSIS — I48.0 PAF (PAROXYSMAL ATRIAL FIBRILLATION) (HCC): ICD-10-CM

## 2019-03-07 DIAGNOSIS — I35.0 NONRHEUMATIC AORTIC VALVE STENOSIS: Primary | ICD-10-CM

## 2019-03-07 DIAGNOSIS — E78.00 HYPERCHOLESTEREMIA: ICD-10-CM

## 2019-03-07 PROCEDURE — 1036F TOBACCO NON-USER: CPT | Performed by: INTERNAL MEDICINE

## 2019-03-07 PROCEDURE — 4040F PNEUMOC VAC/ADMIN/RCVD: CPT | Performed by: INTERNAL MEDICINE

## 2019-03-07 PROCEDURE — G8427 DOCREV CUR MEDS BY ELIG CLIN: HCPCS | Performed by: INTERNAL MEDICINE

## 2019-03-07 PROCEDURE — 1090F PRES/ABSN URINE INCON ASSESS: CPT | Performed by: INTERNAL MEDICINE

## 2019-03-07 PROCEDURE — 1101F PT FALLS ASSESS-DOCD LE1/YR: CPT | Performed by: INTERNAL MEDICINE

## 2019-03-07 PROCEDURE — 99214 OFFICE O/P EST MOD 30 MIN: CPT | Performed by: INTERNAL MEDICINE

## 2019-03-07 PROCEDURE — 1123F ACP DISCUSS/DSCN MKR DOCD: CPT | Performed by: INTERNAL MEDICINE

## 2019-03-07 PROCEDURE — G8420 CALC BMI NORM PARAMETERS: HCPCS | Performed by: INTERNAL MEDICINE

## 2019-03-07 PROCEDURE — G8484 FLU IMMUNIZE NO ADMIN: HCPCS | Performed by: INTERNAL MEDICINE

## 2019-03-07 RX ORDER — MULTIVIT-MIN/IRON/FOLIC ACID/K 18-600-40
2000 CAPSULE ORAL DAILY
COMMUNITY

## 2019-03-07 RX ORDER — CHLORAL HYDRATE 500 MG
1000 CAPSULE ORAL 2 TIMES DAILY
COMMUNITY

## 2019-04-08 ENCOUNTER — ANTI-COAG VISIT (OUTPATIENT)
Dept: PHARMACY | Age: 84
End: 2019-04-08

## 2019-05-02 ENCOUNTER — OFFICE VISIT (OUTPATIENT)
Dept: ORTHOPEDIC SURGERY | Age: 84
End: 2019-05-02
Payer: MEDICARE

## 2019-05-02 VITALS
TEMPERATURE: 98.2 F | BODY MASS INDEX: 22.66 KG/M2 | DIASTOLIC BLOOD PRESSURE: 64 MMHG | HEIGHT: 65 IN | HEART RATE: 59 BPM | RESPIRATION RATE: 16 BRPM | WEIGHT: 136 LBS | SYSTOLIC BLOOD PRESSURE: 143 MMHG

## 2019-05-02 DIAGNOSIS — M17.11 ARTHRITIS OF RIGHT KNEE: Primary | ICD-10-CM

## 2019-05-02 PROCEDURE — 20610 DRAIN/INJ JOINT/BURSA W/O US: CPT | Performed by: PHYSICIAN ASSISTANT

## 2019-05-02 RX ORDER — AMIODARONE HYDROCHLORIDE 100 MG/1
TABLET ORAL
Qty: 90 TABLET | Refills: 1 | Status: SHIPPED | OUTPATIENT
Start: 2019-05-02

## 2019-05-02 NOTE — PROGRESS NOTES
Kenalog:  NDC: [id number]  Lot Number: [id number]  Expiration Date: 04.2020
Activity    Alcohol use: No     Alcohol/week: 0.0 oz    Drug use: No    Sexual activity: Not Currently       Current Outpatient Medications   Medication Sig Dispense Refill    Omega-3 Fatty Acids (FISH OIL) 1000 MG CAPS Take 3,000 mg by mouth 3 times daily      Cholecalciferol (VITAMIN D) 2000 units CAPS capsule Take by mouth      diclofenac sodium (VOLTAREN) 1 % GEL Apply 4 g topically 4 times daily 5 Tube 0    senna-docusate (PERICOLACE) 8.6-50 MG per tablet Take 1 tablet by mouth daily      amiodarone (CORDARONE) 200 MG tablet Take 0.5 tablets by mouth daily 30 tablet 5    potassium chloride (KLOR-CON M) 20 MEQ extended release tablet Take 1 tablet by mouth daily 90 tablet 3    furosemide (LASIX) 20 MG tablet Take 20mg alternating with 40mg QOD. 90 tablet 3    levothyroxine (SYNTHROID) 100 MCG tablet TAKE 1 TABLET BY MOUTH DAILY 90 tablet 1    metroNIDAZOLE (METROGEL) 0.75 % gel Apply topically every 24 hours Apply topically daily to face.  Multiple Vitamins-Minerals (OCUVITE PO) Take by mouth      nystatin (MYCOSTATIN) 777698 UNIT/GM powder Apply 3 times daily. 30 g 3    diazepam (VALIUM) 2 MG tablet Take 1 tablet by mouth nightly as needed for Anxiety or Sleep 5 tablet 2    pantoprazole (PROTONIX) 40 MG tablet Take 1 tablet by mouth 2 times daily (before meals) (Patient taking differently: Take 40 mg by mouth daily ) 180 tablet 3    aspirin 81 MG tablet Take 81 mg by mouth daily       No current facility-administered medications for this visit. Objective:   She is alert, oriented x 3, pleasant, well nourished, developed and in no acute distress. BP (!) 143/64   Pulse 59   Temp 98.2 °F (36.8 °C) (Temporal)   Resp 16   Ht 5' 5\" (1.651 m)   Wt 136 lb (61.7 kg)   BMI 22.63 kg/m²      KNEE EXAM:  Examination of the right knee shows: There is not erythema. ROM- decreased range of motion is noted. There is mild to moderate pain associated with ROM testing.    Extensor

## 2019-05-20 ENCOUNTER — APPOINTMENT (OUTPATIENT)
Dept: GENERAL RADIOLOGY | Age: 84
DRG: 470 | End: 2019-05-20
Payer: MEDICARE

## 2019-05-20 ENCOUNTER — ANESTHESIA (OUTPATIENT)
Dept: OPERATING ROOM | Age: 84
DRG: 470 | End: 2019-05-20
Payer: MEDICARE

## 2019-05-20 ENCOUNTER — HOSPITAL ENCOUNTER (INPATIENT)
Age: 84
LOS: 5 days | Discharge: NURSING, REHAB OR LONG-TERM CARE | DRG: 470 | End: 2019-05-25
Attending: INTERNAL MEDICINE | Admitting: INTERNAL MEDICINE
Payer: MEDICARE

## 2019-05-20 ENCOUNTER — ANESTHESIA EVENT (OUTPATIENT)
Dept: OPERATING ROOM | Age: 84
DRG: 470 | End: 2019-05-20
Payer: MEDICARE

## 2019-05-20 ENCOUNTER — APPOINTMENT (OUTPATIENT)
Dept: CT IMAGING | Age: 84
DRG: 470 | End: 2019-05-20
Payer: MEDICARE

## 2019-05-20 VITALS
OXYGEN SATURATION: 100 % | RESPIRATION RATE: 8 BRPM | TEMPERATURE: 99.1 F | SYSTOLIC BLOOD PRESSURE: 132 MMHG | DIASTOLIC BLOOD PRESSURE: 57 MMHG

## 2019-05-20 DIAGNOSIS — S72.002A CLOSED DISPLACED FRACTURE OF LEFT FEMORAL NECK (HCC): Primary | ICD-10-CM

## 2019-05-20 DIAGNOSIS — S09.90XA CLOSED HEAD INJURY, INITIAL ENCOUNTER: ICD-10-CM

## 2019-05-20 DIAGNOSIS — S42.001A CLOSED NONDISPLACED FRACTURE OF RIGHT CLAVICLE, UNSPECIFIED PART OF CLAVICLE, INITIAL ENCOUNTER: ICD-10-CM

## 2019-05-20 DIAGNOSIS — R42 VERTIGO: ICD-10-CM

## 2019-05-20 DIAGNOSIS — S89.91XA RIGHT KNEE INJURY, INITIAL ENCOUNTER: ICD-10-CM

## 2019-05-20 DIAGNOSIS — S81.812A LACERATION OF LEFT LOWER EXTREMITY, INITIAL ENCOUNTER: ICD-10-CM

## 2019-05-20 DIAGNOSIS — D51.0 ANEMIA, PERNICIOUS: ICD-10-CM

## 2019-05-20 DIAGNOSIS — S16.1XXA CERVICAL STRAIN, ACUTE, INITIAL ENCOUNTER: ICD-10-CM
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
## 2019-05-20 LAB
A/G RATIO: 1 (ref 1.1–2.2)
ABO/RH: NORMAL
ALBUMIN SERPL-MCNC: 3.5 G/DL (ref 3.4–5)
ALP BLD-CCNC: 97 U/L (ref 40–129)
ALT SERPL-CCNC: 16 U/L (ref 10–40)
ANION GAP SERPL CALCULATED.3IONS-SCNC: 11 MMOL/L (ref 3–16)
ANTIBODY SCREEN: NORMAL
AST SERPL-CCNC: 24 U/L (ref 15–37)
BASOPHILS ABSOLUTE: 0.1 K/UL (ref 0–0.2)
BASOPHILS RELATIVE PERCENT: 0.5 %
BILIRUB SERPL-MCNC: 0.7 MG/DL (ref 0–1)
BILIRUBIN URINE: NEGATIVE
BLOOD, URINE: NEGATIVE
BUN BLDV-MCNC: 21 MG/DL (ref 7–20)
CALCIUM SERPL-MCNC: 8.8 MG/DL (ref 8.3–10.6)
CHLORIDE BLD-SCNC: 99 MMOL/L (ref 99–110)
CLARITY: CLEAR
CO2: 24 MMOL/L (ref 21–32)
COLOR: YELLOW
CREAT SERPL-MCNC: 0.9 MG/DL (ref 0.6–1.2)
EKG ATRIAL RATE: 61 BPM
EKG DIAGNOSIS: NORMAL
EKG P AXIS: 92 DEGREES
EKG P-R INTERVAL: 212 MS
EKG Q-T INTERVAL: 478 MS
EKG QRS DURATION: 132 MS
EKG QTC CALCULATION (BAZETT): 481 MS
EKG R AXIS: -48 DEGREES
EKG T AXIS: 88 DEGREES
EKG VENTRICULAR RATE: 61 BPM
EOSINOPHILS ABSOLUTE: 0.2 K/UL (ref 0–0.6)
EOSINOPHILS RELATIVE PERCENT: 1.2 %
EPITHELIAL CELLS, UA: 1 /HPF (ref 0–5)
GFR AFRICAN AMERICAN: >60
GFR NON-AFRICAN AMERICAN: 58
GLOBULIN: 3.4 G/DL
GLUCOSE BLD-MCNC: 104 MG/DL (ref 70–99)
GLUCOSE URINE: NEGATIVE MG/DL
HCT VFR BLD CALC: 35.4 % (ref 36–48)
HEMOGLOBIN: 11.8 G/DL (ref 12–16)
HYALINE CASTS: 1 /LPF (ref 0–8)
KETONES, URINE: NEGATIVE MG/DL
LEUKOCYTE ESTERASE, URINE: ABNORMAL
LYMPHOCYTES ABSOLUTE: 1 K/UL (ref 1–5.1)
LYMPHOCYTES RELATIVE PERCENT: 8.2 %
MCH RBC QN AUTO: 31.9 PG (ref 26–34)
MCHC RBC AUTO-ENTMCNC: 33.5 G/DL (ref 31–36)
MCV RBC AUTO: 95.3 FL (ref 80–100)
MICROSCOPIC EXAMINATION: YES
MONOCYTES ABSOLUTE: 0.5 K/UL (ref 0–1.3)
MONOCYTES RELATIVE PERCENT: 4.3 %
NEUTROPHILS ABSOLUTE: 10.6 K/UL (ref 1.7–7.7)
NEUTROPHILS RELATIVE PERCENT: 85.8 %
NITRITE, URINE: NEGATIVE
PDW BLD-RTO: 17.2 % (ref 12.4–15.4)
PH UA: 6.5 (ref 5–8)
PLATELET # BLD: 178 K/UL (ref 135–450)
PMV BLD AUTO: 6.9 FL (ref 5–10.5)
POTASSIUM SERPL-SCNC: 4.3 MMOL/L (ref 3.5–5.1)
PRO-BNP: 1089 PG/ML (ref 0–449)
PROTEIN UA: NEGATIVE MG/DL
RBC # BLD: 3.71 M/UL (ref 4–5.2)
RBC UA: 2 /HPF (ref 0–4)
SODIUM BLD-SCNC: 134 MMOL/L (ref 136–145)
SPECIFIC GRAVITY UA: 1.01 (ref 1–1.03)
TOTAL CK: 179 U/L (ref 26–192)
TOTAL PROTEIN: 6.9 G/DL (ref 6.4–8.2)
TROPONIN: <0.01 NG/ML
URINE REFLEX TO CULTURE: YES
URINE TYPE: ABNORMAL
UROBILINOGEN, URINE: 1 E.U./DL
WBC # BLD: 12.4 K/UL (ref 4–11)
WBC UA: 1 /HPF (ref 0–5)

## 2019-05-20 PROCEDURE — 88311 DECALCIFY TISSUE: CPT

## 2019-05-20 PROCEDURE — 96361 HYDRATE IV INFUSION ADD-ON: CPT

## 2019-05-20 PROCEDURE — 2709999900 HC NON-CHARGEABLE SUPPLY: Performed by: ORTHOPAEDIC SURGERY

## 2019-05-20 PROCEDURE — 2580000003 HC RX 258: Performed by: INTERNAL MEDICINE

## 2019-05-20 PROCEDURE — 3600000015 HC SURGERY LEVEL 5 ADDTL 15MIN: Performed by: ORTHOPAEDIC SURGERY

## 2019-05-20 PROCEDURE — 90715 TDAP VACCINE 7 YRS/> IM: CPT | Performed by: NURSE PRACTITIONER

## 2019-05-20 PROCEDURE — 6360000002 HC RX W HCPCS: Performed by: NURSE ANESTHETIST, CERTIFIED REGISTERED

## 2019-05-20 PROCEDURE — 2500000003 HC RX 250 WO HCPCS: Performed by: ORTHOPAEDIC SURGERY

## 2019-05-20 PROCEDURE — 87077 CULTURE AEROBIC IDENTIFY: CPT

## 2019-05-20 PROCEDURE — 4500000025 HC ED LEVEL 5 PROCEDURE

## 2019-05-20 PROCEDURE — 93010 ELECTROCARDIOGRAM REPORT: CPT | Performed by: INTERNAL MEDICINE

## 2019-05-20 PROCEDURE — 87086 URINE CULTURE/COLONY COUNT: CPT

## 2019-05-20 PROCEDURE — 90471 IMMUNIZATION ADMIN: CPT | Performed by: NURSE PRACTITIONER

## 2019-05-20 PROCEDURE — 27236 TREAT THIGH FRACTURE: CPT | Performed by: NURSE PRACTITIONER

## 2019-05-20 PROCEDURE — 87186 SC STD MICRODIL/AGAR DIL: CPT

## 2019-05-20 PROCEDURE — 2500000003 HC RX 250 WO HCPCS: Performed by: NURSE ANESTHETIST, CERTIFIED REGISTERED

## 2019-05-20 PROCEDURE — 3700000001 HC ADD 15 MINUTES (ANESTHESIA): Performed by: ORTHOPAEDIC SURGERY

## 2019-05-20 PROCEDURE — 6370000000 HC RX 637 (ALT 250 FOR IP): Performed by: ORTHOPAEDIC SURGERY

## 2019-05-20 PROCEDURE — 93005 ELECTROCARDIOGRAM TRACING: CPT | Performed by: NURSE PRACTITIONER

## 2019-05-20 PROCEDURE — C1776 JOINT DEVICE (IMPLANTABLE): HCPCS | Performed by: ORTHOPAEDIC SURGERY

## 2019-05-20 PROCEDURE — 73552 X-RAY EXAM OF FEMUR 2/>: CPT

## 2019-05-20 PROCEDURE — 73560 X-RAY EXAM OF KNEE 1 OR 2: CPT

## 2019-05-20 PROCEDURE — 6360000002 HC RX W HCPCS: Performed by: ORTHOPAEDIC SURGERY

## 2019-05-20 PROCEDURE — 86900 BLOOD TYPING SEROLOGIC ABO: CPT

## 2019-05-20 PROCEDURE — 86901 BLOOD TYPING SEROLOGIC RH(D): CPT

## 2019-05-20 PROCEDURE — 93005 ELECTROCARDIOGRAM TRACING: CPT | Performed by: INTERNAL MEDICINE

## 2019-05-20 PROCEDURE — 2500000003 HC RX 250 WO HCPCS: Performed by: NURSE PRACTITIONER

## 2019-05-20 PROCEDURE — 7100000001 HC PACU RECOVERY - ADDTL 15 MIN: Performed by: ORTHOPAEDIC SURGERY

## 2019-05-20 PROCEDURE — 70450 CT HEAD/BRAIN W/O DYE: CPT

## 2019-05-20 PROCEDURE — 51702 INSERT TEMP BLADDER CATH: CPT

## 2019-05-20 PROCEDURE — 80053 COMPREHEN METABOLIC PANEL: CPT

## 2019-05-20 PROCEDURE — 73502 X-RAY EXAM HIP UNI 2-3 VIEWS: CPT

## 2019-05-20 PROCEDURE — 3600000005 HC SURGERY LEVEL 5 BASE: Performed by: ORTHOPAEDIC SURGERY

## 2019-05-20 PROCEDURE — 2580000003 HC RX 258: Performed by: NURSE ANESTHETIST, CERTIFIED REGISTERED

## 2019-05-20 PROCEDURE — 81001 URINALYSIS AUTO W/SCOPE: CPT

## 2019-05-20 PROCEDURE — 88305 TISSUE EXAM BY PATHOLOGIST: CPT

## 2019-05-20 PROCEDURE — 85025 COMPLETE CBC W/AUTO DIFF WBC: CPT

## 2019-05-20 PROCEDURE — 71045 X-RAY EXAM CHEST 1 VIEW: CPT

## 2019-05-20 PROCEDURE — 99221 1ST HOSP IP/OBS SF/LOW 40: CPT | Performed by: NURSE PRACTITIONER

## 2019-05-20 PROCEDURE — 96376 TX/PRO/DX INJ SAME DRUG ADON: CPT

## 2019-05-20 PROCEDURE — 84484 ASSAY OF TROPONIN QUANT: CPT

## 2019-05-20 PROCEDURE — 2580000003 HC RX 258: Performed by: NURSE PRACTITIONER

## 2019-05-20 PROCEDURE — 72125 CT NECK SPINE W/O DYE: CPT

## 2019-05-20 PROCEDURE — 6360000002 HC RX W HCPCS: Performed by: NURSE PRACTITIONER

## 2019-05-20 PROCEDURE — 86850 RBC ANTIBODY SCREEN: CPT

## 2019-05-20 PROCEDURE — 23500 CLTX CLAVICULAR FX W/O MNPJ: CPT | Performed by: ORTHOPAEDIC SURGERY

## 2019-05-20 PROCEDURE — 73030 X-RAY EXAM OF SHOULDER: CPT

## 2019-05-20 PROCEDURE — 6360000002 HC RX W HCPCS: Performed by: ANESTHESIOLOGY

## 2019-05-20 PROCEDURE — 96374 THER/PROPH/DIAG INJ IV PUSH: CPT

## 2019-05-20 PROCEDURE — 0SRS0JA REPLACEMENT OF LEFT HIP JOINT, FEMORAL SURFACE WITH SYNTHETIC SUBSTITUTE, UNCEMENTED, OPEN APPROACH: ICD-10-PCS | Performed by: INTERNAL MEDICINE

## 2019-05-20 PROCEDURE — 6370000000 HC RX 637 (ALT 250 FOR IP): Performed by: INTERNAL MEDICINE

## 2019-05-20 PROCEDURE — 2580000003 HC RX 258: Performed by: ORTHOPAEDIC SURGERY

## 2019-05-20 PROCEDURE — 3700000000 HC ANESTHESIA ATTENDED CARE: Performed by: ORTHOPAEDIC SURGERY

## 2019-05-20 PROCEDURE — 83880 ASSAY OF NATRIURETIC PEPTIDE: CPT

## 2019-05-20 PROCEDURE — 7100000000 HC PACU RECOVERY - FIRST 15 MIN: Performed by: ORTHOPAEDIC SURGERY

## 2019-05-20 PROCEDURE — 94664 DEMO&/EVAL PT USE INHALER: CPT

## 2019-05-20 PROCEDURE — 1200000000 HC SEMI PRIVATE

## 2019-05-20 PROCEDURE — 99285 EMERGENCY DEPT VISIT HI MDM: CPT

## 2019-05-20 PROCEDURE — 27236 TREAT THIGH FRACTURE: CPT | Performed by: ORTHOPAEDIC SURGERY

## 2019-05-20 PROCEDURE — 82550 ASSAY OF CK (CPK): CPT

## 2019-05-20 DEVICE — HEAD FEM OD45MM ID26MM HIP CO CHROM POLYETH BPLR CEMENTLESS: Type: IMPLANTABLE DEVICE | Site: HIP | Status: FUNCTIONAL

## 2019-05-20 DEVICE — HEAD FEM DIA26MM +0MM OFFSET HIP CO CHROM V40 TAPR LO FRIC: Type: IMPLANTABLE DEVICE | Site: HIP | Status: FUNCTIONAL

## 2019-05-20 DEVICE — STEM FEM SZ 5 L108MM NK L35MM 44MM OFFSET 127DEG HIP TI: Type: IMPLANTABLE DEVICE | Site: HIP | Status: FUNCTIONAL

## 2019-05-20 RX ORDER — CLINDAMYCIN PHOSPHATE 900 MG/50ML
900 INJECTION INTRAVENOUS EVERY 8 HOURS
Status: COMPLETED | OUTPATIENT
Start: 2019-05-21 | End: 2019-05-21

## 2019-05-20 RX ORDER — BUPIVACAINE HYDROCHLORIDE 5 MG/ML
INJECTION, SOLUTION EPIDURAL; INTRACAUDAL
Status: COMPLETED | OUTPATIENT
Start: 2019-05-20 | End: 2019-05-20

## 2019-05-20 RX ORDER — OXYCODONE HYDROCHLORIDE AND ACETAMINOPHEN 5; 325 MG/1; MG/1
1 TABLET ORAL PRN
Status: DISCONTINUED | OUTPATIENT
Start: 2019-05-20 | End: 2019-05-20 | Stop reason: HOSPADM

## 2019-05-20 RX ORDER — LIDOCAINE HYDROCHLORIDE 10 MG/ML
5 INJECTION, SOLUTION INFILTRATION; PERINEURAL ONCE
Status: DISCONTINUED | OUTPATIENT
Start: 2019-05-20 | End: 2019-05-20

## 2019-05-20 RX ORDER — LEVOTHYROXINE SODIUM 112 UG/1
112 TABLET ORAL DAILY
COMMUNITY

## 2019-05-20 RX ORDER — FENTANYL CITRATE 50 UG/ML
25 INJECTION, SOLUTION INTRAMUSCULAR; INTRAVENOUS EVERY 5 MIN PRN
Status: DISCONTINUED | OUTPATIENT
Start: 2019-05-20 | End: 2019-05-20 | Stop reason: HOSPADM

## 2019-05-20 RX ORDER — ONDANSETRON 2 MG/ML
4 INJECTION INTRAMUSCULAR; INTRAVENOUS EVERY 6 HOURS PRN
Status: DISCONTINUED | OUTPATIENT
Start: 2019-05-20 | End: 2019-05-25 | Stop reason: HOSPADM

## 2019-05-20 RX ORDER — FUROSEMIDE 20 MG/1
20 TABLET ORAL
COMMUNITY

## 2019-05-20 RX ORDER — CLINDAMYCIN PHOSPHATE 900 MG/50ML
900 INJECTION INTRAVENOUS
Status: COMPLETED | OUTPATIENT
Start: 2019-05-20 | End: 2019-05-20

## 2019-05-20 RX ORDER — 0.9 % SODIUM CHLORIDE 0.9 %
500 INTRAVENOUS SOLUTION INTRAVENOUS ONCE
Status: COMPLETED | OUTPATIENT
Start: 2019-05-20 | End: 2019-05-20

## 2019-05-20 RX ORDER — FENTANYL CITRATE 50 UG/ML
25 INJECTION, SOLUTION INTRAMUSCULAR; INTRAVENOUS ONCE
Status: COMPLETED | OUTPATIENT
Start: 2019-05-20 | End: 2019-05-20

## 2019-05-20 RX ORDER — SODIUM CHLORIDE 0.9 % (FLUSH) 0.9 %
10 SYRINGE (ML) INJECTION EVERY 12 HOURS SCHEDULED
Status: DISCONTINUED | OUTPATIENT
Start: 2019-05-20 | End: 2019-05-25 | Stop reason: HOSPADM

## 2019-05-20 RX ORDER — FENTANYL CITRATE 50 UG/ML
INJECTION, SOLUTION INTRAMUSCULAR; INTRAVENOUS PRN
Status: DISCONTINUED | OUTPATIENT
Start: 2019-05-20 | End: 2019-05-20 | Stop reason: SDUPTHER

## 2019-05-20 RX ORDER — SODIUM CHLORIDE 9 MG/ML
INJECTION, SOLUTION INTRAVENOUS CONTINUOUS PRN
Status: DISCONTINUED | OUTPATIENT
Start: 2019-05-20 | End: 2019-05-20 | Stop reason: SDUPTHER

## 2019-05-20 RX ORDER — DOCUSATE SODIUM 100 MG/1
100 CAPSULE, LIQUID FILLED ORAL 2 TIMES DAILY
Status: DISCONTINUED | OUTPATIENT
Start: 2019-05-20 | End: 2019-05-25 | Stop reason: HOSPADM

## 2019-05-20 RX ORDER — FOLIC ACID 1 MG/1
1 TABLET ORAL DAILY
Status: DISCONTINUED | OUTPATIENT
Start: 2019-05-21 | End: 2019-05-25 | Stop reason: HOSPADM

## 2019-05-20 RX ORDER — SUCCINYLCHOLINE/SOD CL,ISO/PF 200MG/10ML
SYRINGE (ML) INTRAVENOUS PRN
Status: DISCONTINUED | OUTPATIENT
Start: 2019-05-20 | End: 2019-05-20 | Stop reason: SDUPTHER

## 2019-05-20 RX ORDER — SODIUM CHLORIDE 0.9 % (FLUSH) 0.9 %
10 SYRINGE (ML) INJECTION EVERY 12 HOURS SCHEDULED
Status: DISCONTINUED | OUTPATIENT
Start: 2019-05-20 | End: 2019-05-20

## 2019-05-20 RX ORDER — SODIUM CHLORIDE 0.9 % (FLUSH) 0.9 %
10 SYRINGE (ML) INJECTION PRN
Status: DISCONTINUED | OUTPATIENT
Start: 2019-05-20 | End: 2019-05-25 | Stop reason: HOSPADM

## 2019-05-20 RX ORDER — ASPIRIN 81 MG/1
81 TABLET, CHEWABLE ORAL DAILY
Status: DISCONTINUED | OUTPATIENT
Start: 2019-05-21 | End: 2019-05-23

## 2019-05-20 RX ORDER — PHENYLEPHRINE HCL IN 0.9% NACL 1 MG/10 ML
SYRINGE (ML) INTRAVENOUS PRN
Status: DISCONTINUED | OUTPATIENT
Start: 2019-05-20 | End: 2019-05-20 | Stop reason: SDUPTHER

## 2019-05-20 RX ORDER — ROCURONIUM BROMIDE 10 MG/ML
INJECTION, SOLUTION INTRAVENOUS PRN
Status: DISCONTINUED | OUTPATIENT
Start: 2019-05-20 | End: 2019-05-20 | Stop reason: SDUPTHER

## 2019-05-20 RX ORDER — DEXAMETHASONE SODIUM PHOSPHATE 4 MG/ML
INJECTION, SOLUTION INTRA-ARTICULAR; INTRALESIONAL; INTRAMUSCULAR; INTRAVENOUS; SOFT TISSUE PRN
Status: DISCONTINUED | OUTPATIENT
Start: 2019-05-20 | End: 2019-05-20 | Stop reason: SDUPTHER

## 2019-05-20 RX ORDER — SODIUM CHLORIDE 0.9 % (FLUSH) 0.9 %
10 SYRINGE (ML) INJECTION PRN
Status: DISCONTINUED | OUTPATIENT
Start: 2019-05-20 | End: 2019-05-20

## 2019-05-20 RX ORDER — SENNA AND DOCUSATE SODIUM 50; 8.6 MG/1; MG/1
1 TABLET, FILM COATED ORAL DAILY
Status: DISCONTINUED | OUTPATIENT
Start: 2019-05-21 | End: 2019-05-25 | Stop reason: HOSPADM

## 2019-05-20 RX ORDER — ONDANSETRON 2 MG/ML
4 INJECTION INTRAMUSCULAR; INTRAVENOUS EVERY 6 HOURS PRN
Status: DISCONTINUED | OUTPATIENT
Start: 2019-05-20 | End: 2019-05-20 | Stop reason: SDUPTHER

## 2019-05-20 RX ORDER — SODIUM CHLORIDE 450 MG/100ML
INJECTION, SOLUTION INTRAVENOUS CONTINUOUS
Status: DISCONTINUED | OUTPATIENT
Start: 2019-05-20 | End: 2019-05-21

## 2019-05-20 RX ORDER — LIDOCAINE HYDROCHLORIDE 20 MG/ML
INJECTION, SOLUTION EPIDURAL; INFILTRATION; INTRACAUDAL; PERINEURAL PRN
Status: DISCONTINUED | OUTPATIENT
Start: 2019-05-20 | End: 2019-05-20 | Stop reason: SDUPTHER

## 2019-05-20 RX ORDER — ONDANSETRON 2 MG/ML
INJECTION INTRAMUSCULAR; INTRAVENOUS PRN
Status: DISCONTINUED | OUTPATIENT
Start: 2019-05-20 | End: 2019-05-20 | Stop reason: SDUPTHER

## 2019-05-20 RX ORDER — PANTOPRAZOLE SODIUM 40 MG/1
40 TABLET, DELAYED RELEASE ORAL DAILY
COMMUNITY

## 2019-05-20 RX ORDER — FUROSEMIDE 40 MG/1
40 TABLET ORAL
Status: ON HOLD | COMMUNITY
End: 2019-05-25 | Stop reason: HOSPADM

## 2019-05-20 RX ORDER — PANTOPRAZOLE SODIUM 40 MG/1
40 TABLET, DELAYED RELEASE ORAL
Status: DISCONTINUED | OUTPATIENT
Start: 2019-05-21 | End: 2019-05-25 | Stop reason: HOSPADM

## 2019-05-20 RX ORDER — PROPOFOL 10 MG/ML
INJECTION, EMULSION INTRAVENOUS PRN
Status: DISCONTINUED | OUTPATIENT
Start: 2019-05-20 | End: 2019-05-20 | Stop reason: SDUPTHER

## 2019-05-20 RX ORDER — LEVOTHYROXINE SODIUM 112 UG/1
112 TABLET ORAL DAILY
Status: DISCONTINUED | OUTPATIENT
Start: 2019-05-21 | End: 2019-05-25 | Stop reason: HOSPADM

## 2019-05-20 RX ORDER — MORPHINE SULFATE 2 MG/ML
2 INJECTION, SOLUTION INTRAMUSCULAR; INTRAVENOUS EVERY 4 HOURS PRN
Status: DISCONTINUED | OUTPATIENT
Start: 2019-05-20 | End: 2019-05-25 | Stop reason: HOSPADM

## 2019-05-20 RX ORDER — AMIODARONE HYDROCHLORIDE 200 MG/1
100 TABLET ORAL DAILY
Status: DISCONTINUED | OUTPATIENT
Start: 2019-05-21 | End: 2019-05-25 | Stop reason: HOSPADM

## 2019-05-20 RX ORDER — ONDANSETRON 2 MG/ML
4 INJECTION INTRAMUSCULAR; INTRAVENOUS
Status: DISCONTINUED | OUTPATIENT
Start: 2019-05-20 | End: 2019-05-20 | Stop reason: HOSPADM

## 2019-05-20 RX ORDER — SODIUM CHLORIDE 9 MG/ML
INJECTION, SOLUTION INTRAVENOUS CONTINUOUS
Status: DISCONTINUED | OUTPATIENT
Start: 2019-05-20 | End: 2019-05-20

## 2019-05-20 RX ORDER — FOLIC ACID 1 MG/1
1 TABLET ORAL DAILY
COMMUNITY

## 2019-05-20 RX ORDER — MAGNESIUM HYDROXIDE 1200 MG/15ML
LIQUID ORAL CONTINUOUS PRN
Status: COMPLETED | OUTPATIENT
Start: 2019-05-20 | End: 2019-05-20

## 2019-05-20 RX ORDER — HYDROCODONE BITARTRATE AND ACETAMINOPHEN 5; 325 MG/1; MG/1
1 TABLET ORAL EVERY 6 HOURS PRN
Status: DISCONTINUED | OUTPATIENT
Start: 2019-05-20 | End: 2019-05-25 | Stop reason: HOSPADM

## 2019-05-20 RX ORDER — OXYCODONE HYDROCHLORIDE AND ACETAMINOPHEN 5; 325 MG/1; MG/1
2 TABLET ORAL PRN
Status: DISCONTINUED | OUTPATIENT
Start: 2019-05-20 | End: 2019-05-20 | Stop reason: HOSPADM

## 2019-05-20 RX ADMIN — FENTANYL CITRATE 25 MCG: 50 INJECTION INTRAMUSCULAR; INTRAVENOUS at 11:57

## 2019-05-20 RX ADMIN — Medication 200 MCG: at 17:57

## 2019-05-20 RX ADMIN — SODIUM CHLORIDE 500 ML: 9 INJECTION, SOLUTION INTRAVENOUS at 11:04

## 2019-05-20 RX ADMIN — HYDROMORPHONE HYDROCHLORIDE 0.5 MG: 1 INJECTION, SOLUTION INTRAMUSCULAR; INTRAVENOUS; SUBCUTANEOUS at 18:25

## 2019-05-20 RX ADMIN — FENTANYL CITRATE 25 MCG: 50 INJECTION INTRAMUSCULAR; INTRAVENOUS at 10:58

## 2019-05-20 RX ADMIN — CLINDAMYCIN PHOSPHATE 900 MG: 18 INJECTION, SOLUTION INTRAMUSCULAR; INTRAVENOUS at 16:42

## 2019-05-20 RX ADMIN — SUGAMMADEX 200 MG: 100 INJECTION, SOLUTION INTRAVENOUS at 17:56

## 2019-05-20 RX ADMIN — Medication 200 MCG: at 17:15

## 2019-05-20 RX ADMIN — HYDROCODONE BITARTRATE AND ACETAMINOPHEN 1 TABLET: 5; 325 TABLET ORAL at 23:15

## 2019-05-20 RX ADMIN — Medication 200 MCG: at 17:45

## 2019-05-20 RX ADMIN — CLINDAMYCIN PHOSPHATE 900 MG: 900 INJECTION, SOLUTION INTRAVENOUS at 23:15

## 2019-05-20 RX ADMIN — PROPOFOL 30 MG: 10 INJECTION, EMULSION INTRAVENOUS at 16:38

## 2019-05-20 RX ADMIN — Medication 80 MG: at 16:38

## 2019-05-20 RX ADMIN — TETANUS TOXOID, REDUCED DIPHTHERIA TOXOID AND ACELLULAR PERTUSSIS VACCINE, ADSORBED 0.5 ML: 5; 2.5; 8; 8; 2.5 SUSPENSION INTRAMUSCULAR at 11:00

## 2019-05-20 RX ADMIN — Medication 200 MCG: at 17:24

## 2019-05-20 RX ADMIN — SODIUM CHLORIDE: 9 INJECTION, SOLUTION INTRAVENOUS at 16:36

## 2019-05-20 RX ADMIN — Medication 10 ML: at 23:16

## 2019-05-20 RX ADMIN — HYDROMORPHONE HYDROCHLORIDE 0.5 MG: 1 INJECTION, SOLUTION INTRAMUSCULAR; INTRAVENOUS; SUBCUTANEOUS at 18:20

## 2019-05-20 RX ADMIN — DOCUSATE SODIUM 100 MG: 100 CAPSULE, LIQUID FILLED ORAL at 23:15

## 2019-05-20 RX ADMIN — SODIUM CHLORIDE: 4.5 INJECTION, SOLUTION INTRAVENOUS at 23:17

## 2019-05-20 RX ADMIN — ONDANSETRON 4 MG: 2 INJECTION INTRAMUSCULAR; INTRAVENOUS at 16:53

## 2019-05-20 RX ADMIN — Medication 200 MCG: at 17:33

## 2019-05-20 RX ADMIN — ROCURONIUM BROMIDE 30 MG: 10 INJECTION INTRAVENOUS at 16:50

## 2019-05-20 RX ADMIN — Medication 100 MCG: at 17:04

## 2019-05-20 RX ADMIN — LIDOCAINE HYDROCHLORIDE 10 MG: 20 INJECTION, SOLUTION EPIDURAL; INFILTRATION; INTRACAUDAL; PERINEURAL at 16:38

## 2019-05-20 RX ADMIN — FENTANYL CITRATE 25 MCG: 50 INJECTION, SOLUTION INTRAMUSCULAR; INTRAVENOUS at 09:25

## 2019-05-20 RX ADMIN — FENTANYL CITRATE 100 MCG: 50 INJECTION INTRAMUSCULAR; INTRAVENOUS at 16:38

## 2019-05-20 RX ADMIN — DEXAMETHASONE SODIUM PHOSPHATE 4 MG: 4 INJECTION, SOLUTION INTRAMUSCULAR; INTRAVENOUS at 16:53

## 2019-05-20 RX ADMIN — SODIUM CHLORIDE: 9 INJECTION, SOLUTION INTRAVENOUS at 17:41

## 2019-05-20 RX ADMIN — Medication 100 MCG: at 16:48

## 2019-05-20 ASSESSMENT — PAIN DESCRIPTION - FREQUENCY
FREQUENCY: CONTINUOUS
FREQUENCY: INTERMITTENT
FREQUENCY: CONTINUOUS

## 2019-05-20 ASSESSMENT — PULMONARY FUNCTION TESTS
PIF_VALUE: 21
PIF_VALUE: 2
PIF_VALUE: 18
PIF_VALUE: 21
PIF_VALUE: 18
PIF_VALUE: 18
PIF_VALUE: 25
PIF_VALUE: 18
PIF_VALUE: 20
PIF_VALUE: 3
PIF_VALUE: 18
PIF_VALUE: 21
PIF_VALUE: 23
PIF_VALUE: 18
PIF_VALUE: 25
PIF_VALUE: 23
PIF_VALUE: 1
PIF_VALUE: 19
PIF_VALUE: 17
PIF_VALUE: 21
PIF_VALUE: 1
PIF_VALUE: 20
PIF_VALUE: 18
PIF_VALUE: 18
PIF_VALUE: 17
PIF_VALUE: 27
PIF_VALUE: 18
PIF_VALUE: 20
PIF_VALUE: 18
PIF_VALUE: 21
PIF_VALUE: 0
PIF_VALUE: 18
PIF_VALUE: 17
PIF_VALUE: 18
PIF_VALUE: 20
PIF_VALUE: 18
PIF_VALUE: 18
PIF_VALUE: 20
PIF_VALUE: 21
PIF_VALUE: 20
PIF_VALUE: 20
PIF_VALUE: 21
PIF_VALUE: 20
PIF_VALUE: 18
PIF_VALUE: 20
PIF_VALUE: 22
PIF_VALUE: 23
PIF_VALUE: 17
PIF_VALUE: 21
PIF_VALUE: 16
PIF_VALUE: 20
PIF_VALUE: 23
PIF_VALUE: 21
PIF_VALUE: 18
PIF_VALUE: 17
PIF_VALUE: 21
PIF_VALUE: 17
PIF_VALUE: 22
PIF_VALUE: 21
PIF_VALUE: 17
PIF_VALUE: 19
PIF_VALUE: 17
PIF_VALUE: 22
PIF_VALUE: 18
PIF_VALUE: 18
PIF_VALUE: 20
PIF_VALUE: 21
PIF_VALUE: 20
PIF_VALUE: 18
PIF_VALUE: 18
PIF_VALUE: 17
PIF_VALUE: 18
PIF_VALUE: 18

## 2019-05-20 ASSESSMENT — PAIN DESCRIPTION - ORIENTATION
ORIENTATION: RIGHT
ORIENTATION: LEFT

## 2019-05-20 ASSESSMENT — PAIN DESCRIPTION - PROGRESSION
CLINICAL_PROGRESSION: RAPIDLY WORSENING
CLINICAL_PROGRESSION: GRADUALLY IMPROVING
CLINICAL_PROGRESSION: GRADUALLY WORSENING
CLINICAL_PROGRESSION: OTHER (COMMENT)
CLINICAL_PROGRESSION: GRADUALLY IMPROVING
CLINICAL_PROGRESSION: GRADUALLY IMPROVING

## 2019-05-20 ASSESSMENT — PAIN SCALES - GENERAL
PAINLEVEL_OUTOF10: 9
PAINLEVEL_OUTOF10: 5
PAINLEVEL_OUTOF10: 6
PAINLEVEL_OUTOF10: 0
PAINLEVEL_OUTOF10: 5
PAINLEVEL_OUTOF10: 6
PAINLEVEL_OUTOF10: 10
PAINLEVEL_OUTOF10: 3
PAINLEVEL_OUTOF10: 0
PAINLEVEL_OUTOF10: 3
PAINLEVEL_OUTOF10: 7
PAINLEVEL_OUTOF10: 6
PAINLEVEL_OUTOF10: 0
PAINLEVEL_OUTOF10: 6
PAINLEVEL_OUTOF10: 4
PAINLEVEL_OUTOF10: 6

## 2019-05-20 ASSESSMENT — PAIN DESCRIPTION - PAIN TYPE
TYPE: SURGICAL PAIN
TYPE: ACUTE PAIN
TYPE: ACUTE PAIN
TYPE: SURGICAL PAIN
TYPE: ACUTE PAIN
TYPE: SURGICAL PAIN
TYPE: SURGICAL PAIN

## 2019-05-20 ASSESSMENT — PAIN DESCRIPTION - DESCRIPTORS
DESCRIPTORS: CONSTANT
DESCRIPTORS: DISCOMFORT;SORE;ACHING
DESCRIPTORS: ACHING
DESCRIPTORS: ACHING;DISCOMFORT;SORE
DESCRIPTORS: CONSTANT
DESCRIPTORS: SHARP
DESCRIPTORS: SHARP;ACHING;DISCOMFORT;SORE

## 2019-05-20 ASSESSMENT — PAIN DESCRIPTION - ONSET
ONSET: ON-GOING
ONSET: AWAKENED FROM SLEEP
ONSET: ON-GOING

## 2019-05-20 ASSESSMENT — PAIN DESCRIPTION - LOCATION
LOCATION: HIP
LOCATION: SHOULDER
LOCATION: HIP

## 2019-05-20 ASSESSMENT — PAIN SCALES - WONG BAKER
WONGBAKER_NUMERICALRESPONSE: 0
WONGBAKER_NUMERICALRESPONSE: 6
WONGBAKER_NUMERICALRESPONSE: 8
WONGBAKER_NUMERICALRESPONSE: 10

## 2019-05-20 ASSESSMENT — ENCOUNTER SYMPTOMS: SHORTNESS OF BREATH: 1

## 2019-05-20 NOTE — LETTER
May 20, 2019    [facility]  Consult Billing Form:      DEMOGRAPHICS:                                                                                                              .    Patient Name:  Azalia Roland  Patient :  1922   Patient SS#:  [id number]    Patient Phone:  [phone] (home)  Alt. Patient Phone:    Patient Address:  [address] [facility] [address]    PCP:  Yoana Rachel DO  Insurance:  Payor: Lelo Duvall / Plan: MEDICARE PART A AND B / Product Type: *No Product type* /   Insurance ID Number:    DIAGNOSIS & PROCEDURE:                                                                                            .    Diagnosis:   rihgt clav fx, left hip fx, right knee laceration    Hospital:  [facility]    Provider:  Betsy FIGUEREDO    SCHEDULING INFORMATION:                                                                                         .     Date of Consultation:                              Betsy FIGUEREDO  19     BILLING INFORMATION:                                                                                                    .    Procedure:       CPT Code Modifier

## 2019-05-20 NOTE — PROGRESS NOTES
Pt smiles to call of name, states no to pain, wiggles feet to command, appears to rest/sleep at intervals

## 2019-05-20 NOTE — PROGRESS NOTES
Medicated for pain per order. Resting in bed. Vss.   Electronically signed by Maekda Powell RN on 5/20/2019 at 6:55 PM

## 2019-05-20 NOTE — PROGRESS NOTES
Medication Reconciliation    List of medications patient is currently taking is complete. Source of information: 1. Conversation with patient's family at bedside                                      2. EPIC records                                       3. [facility] medication records                                      4. Conversation with patient's nurse at [facility] [azithromycin]; Erythromycin; Amoxicillin; and Aspirin     Notes regarding home medications:   1. Patient did not receive any of her home medications prior to arrival to the emergency department today. 2. Patient alternates Lasix 20 mg daily and Lasix 40 mg daily - she is due for her 40 mg po daily dose today.     Jaiden Donaldson, PharmD, BCPS  5/20/2019 11:34 AM

## 2019-05-20 NOTE — CONSULTS
[facility] Orthopedic Surgery  Consult Note    This patient is seen in consultation at the request of Edward Duong NP    Reason for Consult:  Left hip fracture, right clavicle fx, right knee laceration    CHIEF COMPLAINT:  Left hip pain , right shoulder pain    History Obtained From:  patient, electronic medical record    HISTORY OF PRESENT ILLNESS:    The patient is a 80 y.o. female who presents with a fall at [facility]. She reports she fell several times in one week but today had severe left  Hip pain and unable to move. per family pt having trouble with balance for one week and at times forgetful for one week. . Pain is described in left hip mostly and with the intensity of moderate at rest and severe with movment right leg. Also pain right anterior shoulder, moderate and then severe with movement right arm. Pain in right knee is mild but very tender to touch she says. . Pain is described as aching, burning. Discomfort is constant. She is alert and oriented at this time. Family at bedside. No other complaints. Past Medical History:        Diagnosis Date    Anemia, pernicious     Arthritis     both hand (R worse than the L)    Cataract     both eyes    High blood pressure     previous history but off of medication for 1 year.     Hyperlipidemia     on statin    Hypothyroidism     oral supplementation    Macular degeneration     injection to left eye every 6 weeks    Melanoma ([facility] [address] 2014    lesions removed from nose and forehead    Nocturia     Urinary incontinence 2006    Vertigo 2006    treated with meclizine       Past Surgical History:        Procedure Laterality Date    CARDIOVERSION  01/2017    CATARACT REMOVAL  2010    bilateral removal    ENDOSCOPY, COLON, DIAGNOSTIC  5/23/16    Esophagogastroduodenoscopy with biopsy    INGUINAL HERNIA REPAIR  2007    JOINT REPLACEMENT  2008    left  knee     MALIGNANT SKIN LESION EXCISION  2010    nose and forehead    PERCUTANEOUS BALLOON VALVULOPLASTY  2016    [facility]       Social History     Tobacco Use    Smoking status: Former Smoker     Last attempt to quit: 1950     Years since quittin.4    Smokeless tobacco: Never Used    Tobacco comment: social, quit over 50 years ago   Substance Use Topics    Alcohol use: No     Alcohol/week: 0.0 oz       Family History   Problem Relation Age of Onset    Cancer Mother     Heart Disease Father            Current Medications:   Current Facility-Administered Medications: lidocaine 1 % injection 5 mL, 5 mL, Intradermal, Once  Allergies:  Zithromax [azithromycin]; Erythromycin; Amoxicillin; and Aspirin    REVIEW OF SYSTEMS:    CONSTITUTIONAL:  negative for  fevers, chills and malaise  MUSCULOSKELETAL:  positive for  myalgias, arthralgias and pain  All other ROS reviewed in chart or with patient or family and are grossly negative. PHYSICAL EXAM:    VITALS:  BP (!) 171/70   Pulse 60   Temp 97.6 °F (36.4 °C) (Oral)   Resp 16   Wt 136 lb 7.4 oz (61.9 kg)   SpO2 97%   BMI 22.71 kg/m²     MUSCULOSKELETAL:  bilateral foot and hand NVI. Wiggles toes and fingers to command. Pedal and radial pulses are palpable. Notable large laceration/skin tear right anterior knee, bleeding, does not appear deep enough to expose bone but needs cleanup to be sure. ABle to bend right knee to 45 degrees with minimal pain anteriorly only. Right clavicle with mild deformity noted and tender. Pain in right clavicle worse with abduction right shoulder. Left groin tender to palpation, left leg externally rotated and shortened. NOntender left knee or ankle. Nontender right hip or ankle. NOntender bilateral shoulder, elbow or wrists.    NEUROLOGIC:   Sensory:    Touch:                     Right Upper Extremity:  normal                   Left Upper Extremity:  normal                  Right Lower Extremity:  normal                  Left Lower Extremity:  normal    Skin warm and dry  Resp deep and easy  Abdomen soft and and hip: Acute displaced fracture of the left femoral neck.         Narrative   EXAMINATION:   3 XRAY VIEWS OF THE RIGHT SHOULDER       5/20/2019 9:40 am       COMPARISON:   None       HISTORY:   ORDERING SYSTEM PROVIDED HISTORY: pain and limited ROM after fall   TECHNOLOGIST PROVIDED HISTORY:   Reason for exam:->pain and limited ROM after fall   Ordering Physician Provided Reason for Exam: Susan Ordaz   Acuity: Acute   Type of Exam: Initial   Mechanism of Injury: RT shoulder pain       FINDINGS:   There is evidence of fracture of the distal right clavicle which may be   subacute.  No evidence of acromioclavicular dislocation.  The glenohumeral   joint is well aligned although significant degenerative changes are noted. The scapula appears intact.           Impression   Right distal clavicular fracture.  This may be subacute to chronic. Degenerative changes in the glenohumeral joint.  No evidence of glenohumeral   dislocation.               IMPRESSION/RECOMMENDATIONS:    Fall  Right shoulder pain, noted right clavicular fx distally, nonoperative in sling prn pain for comfort, No right shoulder abduction  Left hip pain. Left femoral neck fx, displaced. Plan hemiarthroplasty today per DR Yamil Delgado  Right knee laceration. Wash out and close per ER. NPO for surgery today  Discussed with Dr Yamil Delgado by phone  Discussed with pt and family. All agreed to proceed .  Goal pt to be out of bed to chair/short ambulation again      [facility]  5/20/2019  12:28 PM

## 2019-05-20 NOTE — ED PROVIDER NOTES
I was asked to take this picture for placement in the chart, I did not evaluate or treat this patient.       [place], [place]  05/20/19 [id number]

## 2019-05-20 NOTE — PROGRESS NOTES
Xray completed left hip. morteza fair.   Electronically signed by Suzi Mcginnis RN on 5/20/2019 at 6:46 PM

## 2019-05-20 NOTE — ED NOTES
Patient brought in from nursing facility after falling twice this morning. Patient with c/o right shoulder pain, right knee lac and left hip pain. Patient could not remember if she hit her head or not. Patient alert and oriented x 3 with stable vitals.       Sonia Blake RN  05/20/19 [id number]

## 2019-05-20 NOTE — ED PROVIDER NOTES
[address]  [address] [facility] [address]  Dept: [phone]  Loc: [phone]    eMERGENCY dEPARTMENT eNCOUnter    Evaluated by the Advanced Practice Provider    [facility]    Chief Complaint   Patient presents with    Fall     Patient with 2 falls this morning attempting to ambulate without assistance.  Shoulder Pain     c/o right shoulder pain     Knee Injury     Previous right knee injury that re-opened after fall this am.        HPI    Kamini Craft is a 80 y.o. female who presents with 2 falls that occurred this morning at the nursing facility. The patient is residing in an assisted living and they report 2 falls. Unknown length of time that she was found on the floor from the initial fall. Family at the bedside reports that she's been falling a lot lately. The patient is unable to tell me if she had a loss of consciousness. She states she did not strike her head. She is complaining of neck pain, left hip and leg pain and right shoulder pain. She is also complaining of right knee pain. She denies chest pain, shortness of breath, blurred vision or headache. REVIEW OF SYSTEMS    Neurologic: unknown LOC, unknown Head injury  Cardiac: No Chest Pain, unknown syncope  Respiratory: No difficulty breathing  GI: No abdominal pain  Musculoskeletal: see HPI  All other systems reviewed and are negative. PAST MEDICAL & SURGICAL HISTORY    Past Medical History:   Diagnosis Date    Anemia, pernicious     Arthritis     both hand (R worse than the L)    Cataract     both eyes    High blood pressure     previous history but off of medication for 1 year.     Hyperlipidemia     on statin    Hypothyroidism     oral supplementation    Macular degeneration     injection to left eye every 6 weeks    Melanoma ([facility] [address] 2014    lesions removed from nose and forehead    Nocturia     Urinary incontinence 2006    Vertigo 2006    treated with meclizine     Past Surgical History:   Procedure Laterality Date    CARDIOVERSION  01/2017    CATARACT REMOVAL  2010    bilateral removal    ENDOSCOPY, COLON, DIAGNOSTIC  5/23/16    Esophagogastroduodenoscopy with biopsy    HEMIARTHROPLASTY HIP Left 5/20/2019    LEFT HIP HEMIARTHROPLASTY performed by Reny Sevilla MD at [address] [place] [address]  2007    JOINT REPLACEMENT  2008    left  knee     MALIGNANT SKIN LESION EXCISION  2010    nose and forehead    PERCUTANEOUS BALLOON VALVULOPLASTY  5/20/2016    [facility]       CURRENT MEDICATIONS  (may include discharge medications prescribed in the ED)  Current Outpatient Rx   Medication Sig Dispense Refill    diazepam (VALIUM) 2 MG tablet Take 1 tablet by mouth nightly as needed for Anxiety or Sleep for up to 5 days. 5 tablet 2    HYDROcodone-acetaminophen (NORCO) 5-325 MG per tablet Take 1 tablet by mouth every 6 hours as needed for Pain for up to 7 days. 30 tablet 0    aspirin EC 81 MG EC tablet Take 1 tablet by mouth 2 times daily Please avoid missing doses. 60 tablet 0       ALLERGIES    Allergies   Allergen Reactions    Zithromax [Azithromycin] Diarrhea     Diarrhea/cramping    Erythromycin Other (See Comments)     Stomach cramps  Nausea and diarrhea      Amoxicillin Hives    Aspirin Other (See Comments)     Other reaction(s): Other (See Comments)  Upset stomach only if non coated  Upset stomach  Full dose causes upset stomach but able to take 81 mg dose       SOCIAL & FAMILY HISTORY    Social History     Socioeconomic History    Marital status:       Spouse name: None    Number of children: 11    Years of education: None    Highest education level: None   Occupational History    Occupation: Retired   Social Needs    Financial resource strain: None    Food insecurity:     Worry: None     Inability: None    Transportation needs:     Medical: None     Non-medical: None   Tobacco Use    Smoking status: Former Smoker motion secondary to pain in the hip and the proximal femur. Patient is able to flex and extend on the right elbow and wrist.  She is unable to lift her shoulder. She complains of pain in the right clavicle area with palpation. No bony abnormalities or crepitance. No bruising. The patient has pain at rest and with range of motion to the right knee. I am able to put the knee through flexion and extension but the patient has pain. No cervical spine pain with palpation. No bony abnormalities or step-offs. Integument:  Poor skin turgor. Large irregular skin flap laceration over the right knee. Please see details of this wound in the procedure note. Neurologic:  Alert & oriented x4, no slurred speech  Psych: Pleasant affect, no hallucinations    EKG  Twelve-lead EKG reviewed by myself and interpreted by Dr. Bell Patel  Ventricular rate 61 bpm, ME interval 212 ms, QRS duration 132 ms,  ms  There is no ST elevation or depression noted. Interpretation is a sinus rhythm with first-degree block. Today's EKG compared to the tracing done on November 14, 2018. No acute changes noted. RADIOLOGY  (interpreted by the radiologist)  XR HIP 2-3 VW W PELVIS LEFT   Final Result   Postoperative changes related to interval placement of left hip arthroplasty   hardware. No significant periprosthetic lucency or acute osseous abnormality   identified. CT Head WO Contrast   Final Result   No acute intracranial abnormality. CT Cervical Spine WO Contrast   Final Result   No acute abnormality of the cervical spine. XR SHOULDER RIGHT (MIN 2 VIEWS)   Final Result   Right distal clavicular fracture. This may be subacute to chronic. Degenerative changes in the glenohumeral joint. No evidence of glenohumeral   dislocation. XR CHEST PORTABLE   Final Result   No acute process. Unchanged mild pulmonary vascular congestion.          XR FEMUR LEFT (MIN 2 VIEWS)   Final Result   Left femur and hip: Acute displaced fracture of the left femoral neck. XR HIP LEFT (2-3 VIEWS)   Final Result   Left femur and hip: Acute displaced fracture of the left femoral neck. XR KNEE RIGHT (1-2 VIEWS)   Final Result   Soft tissue swelling and joint effusion without evidence of fracture. Advanced tricompartmental osteoarthritis.            Labs Reviewed   URINE CULTURE - Abnormal; Notable for the following components:       Result Value    Organism Proteus mirabilis (*)     Organism Escherichia coli (*)     All other components within normal limits    Narrative:     ORDER#: [id number]                          ORDERED BY: Jasiel Galindo  SOURCE: Urine Clean Catch                  COLLECTED:  05/20/19 09:13  ANTIBIOTICS AT MINI.:                      RECEIVED :  05/20/19 09:46  Performed at:  [facility]  [address] [place] [address] [employer] [address]   Phone [phone]   CBC WITH AUTO DIFFERENTIAL - Abnormal; Notable for the following components:    WBC 12.4 (*)     RBC 3.71 (*)     Hemoglobin 11.8 (*)     Hematocrit 35.4 (*)     RDW 17.2 (*)     Neutrophils # 10.6 (*)     All other components within normal limits    Narrative:     Performed at:  [address] [employer] [address]   Phone [phone]   COMPREHENSIVE METABOLIC PANEL - Abnormal; Notable for the following components:    Sodium 134 (*)     Glucose 104 (*)     BUN 21 (*)     GFR Non-African American 58 (*)     Albumin/Globulin Ratio 1.0 (*)     All other components within normal limits    Narrative:     Performed at:  [facility]  [address] [facility] [employer] [address]   Phone [phone]   URINE RT REFLEX TO CULTURE - Abnormal; Notable for the following components:    Leukocyte Esterase, Urine TRACE (*)     All other components within normal limits    Narrative:     Performed at:  [facility]  [address] [email or web address]   Phone [phone]   BRAIN NATRIURETIC PEPTIDE - Abnormal; Notable for the following components:    Pro-BNP 1,089 (*)     All other components within normal limits    Narrative:     Performed at:  [facility]  [address] [facility] [employer]   Phone [phone]   CBC WITH AUTO DIFFERENTIAL - Abnormal; Notable for the following components:    Hemoglobin 8.2 (*)     Hematocrit 24.4 (*)     WBC 11.5 (*)     RBC 2.55 (*)     RDW 17.3 (*)     Platelets 801 (*)     Neutrophils # 10.0 (*)     Lymphocytes # 0.8 (*)     All other components within normal limits    Narrative:     Performed at:  [facility]  [address] [facility] [email or web address] [address]   Phone [phone]   RENAL FUNCTION PANEL - Abnormal; Notable for the following components:    Sodium 135 (*)     Glucose 141 (*)     BUN 21 (*)     Calcium 8.2 (*)     Alb 3.0 (*)     All other components within normal limits    Narrative:     Performed at:  [facility]  [address] [facility] [email or web address] [address]   Phone [phone]   CBC WITH AUTO DIFFERENTIAL - Abnormal; Notable for the following components:    RBC 2.19 (*)     Hemoglobin 7.1 (*)     Hematocrit 21.2 (*)     RDW 17.4 (*)     Platelets 143 (*)     Neutrophils # 8.3 (*)     All other components within normal limits    Narrative:     Performed at:  [facility]  [address] [facility] [email or web address] [address]   Phone [phone]   RENAL FUNCTION PANEL - Abnormal; Notable for the following components:    Sodium 130 (*)     Chloride 95 (*)     Glucose 145 (*)     BUN 34 (*)     CREATININE 1.5 (*)     GFR Non- 32 (*)     GFR  39 (*)     Calcium 8.2 (*)     Alb 3.0 (*)     All other components within normal limits    Narrative:     Performed at:  [facility]  [address] [place], [place] [address]   Phone [phone]   CK    Narrative:     Performed at:  [facility]  [address] [place], [place] [address]   Phone [phone]   TROPONIN    Narrative:     Performed at:  [facility]  [address] [place], [place] [address]   Phone [phone]   MICROSCOPIC URINALYSIS    Narrative:     Performed at:  [facility]  [address] [place], [place] [address]   Phone [phone]   SURGICAL PATHOLOGY    Narrative:                                          [facility]                                       [address] [facility] [address]                                       Fax [phone]   Ph. [phone]  Department of Pathology  FINAL SURGICAL PATHOLOGY REPORT  Patient Name:  Ulysses Gaspar           Accession No:  [id number]   Age Sex:   1922    80 Y / F      Location:      [place] [address]  Account No:    [de-identified]                 Collected:     2019  Med Rec No:    [id number]                Received:      2019  Attend Phys:   Erich Banuelos MD    Completed:     2019  Perform Phys:  Jethro Guerra MD           FINAL DIAGNOSIS:    Femoral head, left, resection:     - Bone with degenerative change and thinned bony trabeculae     KIRSE/KIRSE        Preoperative Diagnosis:  Left hip fracture  Postoperative Diagnosis:  Same    SPECIMEN:  LEFT FEMORAL HEAD     GROSS DESCRIPTION:   Left femoral head: The specimen is received in  formalin, labeled \"Nieves, Cyndee\" and \"left femoral head. \" It consists  of a femoral head, measuring 4.3 cm in diameter. The articular surface  displays overlying tan-yellow cartilage. The resection margin is  hemorrhagic and jagged. Sectioning displays yellow, focally hemorrhagic  trabeculated bone.  Representative sections are submitted in 4 cassettes  following decalcification. PANJA/PJS     MICROSCOPIC DESCRIPTION:  Microscopic examination performed. CPT: 76799 X1   27362 X1    Case signed out at [facility], [address].[facility], [address]  Technical processing at [facility], [address] [facility] [address]  Phone [phone]        Felice Pedraza M.D.  (Electronic Signature)  05/22/2019                                                                     Page 1 of 1   SURGICAL PATHOLOGY   CBC WITH AUTO DIFFERENTIAL   RENAL FUNCTION PANEL   TYPE AND SCREEN    Narrative:     Performed at:  [facility]  [address] [facility] [address]   Phone [phone]     Lac Repair  Date/Time: 5/20/2019 1:00 PM  Performed by: CAMRON Jacob CNP  Authorized by: CAMRON Jacob CNP     Consent:     Consent obtained:  Verbal    Consent given by:  Patient (children)    Risks discussed:  Infection, need for additional repair, nerve damage, pain, poor cosmetic result, poor wound healing, retained foreign body, tendon damage and vascular damage  Anesthesia (see MAR for exact dosages): Anesthesia method:  Local infiltration    Local anesthetic:  Lidocaine 1% w/o epi  Laceration details:     Location:  Leg    Leg location:  R knee    Wound length (cm): 8x10 cm irregular skin tear with multiple flaps.   Repair type:     Repair type:  Complex  Exploration:     Limited defect created (wound extended): no      Hemostasis achieved with:  Direct pressure    Wound exploration: wound explored through full range of motion and entire depth of wound probed and visualized      Wound extent: no fascia violation noted, no foreign bodies/material noted, no muscle damage noted, no nerve damage noted, no tendon damage noted, no underlying fracture noted and no vascular damage noted      Contaminated: no    Treatment:     Area cleansed with:  Saline (And chlorhexidine)    Amount of cleaning:  Extensive    Irrigation solution:  Sterile saline    Irrigation method:  Syringe    Visualized foreign bodies/material removed: no      Debridement:  Moderate (Multiple hematomas evacuated)    Undermining:  None  Skin repair:     Repair method:  Sutures and Steri-Strips    Suture size:  3-0    Suture material:  Nylon    Suture technique:  Simple interrupted    Number of sutures: 21+    Number of Steri-Strips: 12+  Approximation:     Laceration repair approximation: I was able to approximate most of the skin flap with a good approximation however at about the 1:00 position there was missing tissue and which I was unable to approximate. Post-procedure details:     Dressing:  Antibiotic ointment, bulky dressing and splint for protection    Patient tolerance of procedure: Tolerated well, no immediate complications  Comments:      Patient placed in a knee immobilizer for protection of the laceration repair. I spent 45 minutes repairing this wound. ED COURSE & MEDICAL DECISION MAKING    Pertinent studies reviewed and interpreted.  (See chart for details)  See chart for details of medications ordered  Medications   morphine (PF) injection 2 mg (has no administration in time range)   HYDROcodone-acetaminophen (NORCO) 5-325 MG per tablet 1 tablet (1 tablet Oral Given 5/21/19 2335)   sodium chloride flush 0.9 % injection 10 mL (10 mLs Intravenous Not Given 5/22/19 2302)   sodium chloride flush 0.9 % injection 10 mL (has no administration in time range)   magnesium hydroxide (MILK OF MAGNESIA) 400 MG/5ML suspension 30 mL (has no administration in time range)   ondansetron (ZOFRAN) injection 4 mg (has no administration in time range)   enoxaparin (LOVENOX) injection 40 mg (40 mg Subcutaneous Given 5/22/19 0932)   docusate sodium (COLACE) capsule 100 mg (100 mg Oral Given 5/22/19 2103)   amiodarone (CORDARONE) tablet 100 mg (100 mg Oral Given 5/22/19 0932) aspirin chewable tablet 81 mg (81 mg Oral Given 5/22/19 0932)   vitamin D (CHOLECALCIFEROL) tablet 2,000 Units (2,000 Units Oral Given 8/67/34 6888)   folic acid (FOLVITE) tablet 1 mg (1 mg Oral Given 5/22/19 0932)   levothyroxine (SYNTHROID) tablet 112 mcg (112 mcg Oral Given 5/22/19 0520)   pantoprazole (PROTONIX) tablet 40 mg (40 mg Oral Given 5/22/19 0520)   sennosides-docusate sodium (SENOKOT-S) 8.6-50 MG tablet 1 tablet (1 tablet Oral Given 5/22/19 0932)   lactobacillus (CULTURELLE) capsule 1 capsule (1 capsule Oral Not Given 5/22/19 1017)   acetaminophen (TYLENOL) tablet 650 mg (650 mg Oral Given 5/22/19 2103)   0.9 % sodium chloride infusion ( Intravenous New Bag 5/22/19 1752)   Tetanus-Diphth-Acell Pertussis (BOOSTRIX) injection 0.5 mL (0.5 mLs Intramuscular Given 5/20/19 1100)   fentaNYL (SUBLIMAZE) injection 25 mcg (25 mcg Intravenous Given 5/20/19 0925)   0.9 % sodium chloride bolus (0 mLs Intravenous Stopped 5/20/19 1204)   fentaNYL (SUBLIMAZE) injection 25 mcg (25 mcg Intravenous Given 5/20/19 1058)   fentaNYL (SUBLIMAZE) injection 25 mcg (25 mcg Intravenous Given 5/20/19 1157)   clindamycin (CLEOCIN) 900 mg in dextrose 5 % 50 mL IVPB (900 mg Intravenous Given 5/20/19 1642)   clindamycin (CLEOCIN) 900 mg in dextrose 5 % 50 mL IVPB (0 mg Intravenous Stopped 5/21/19 1013)   sodium chloride 0.9 % irrigation (1,000 mLs Irrigation New Bag 5/20/19 1701)   sodium chloride 0.9 % 3,000 mL with gentamicin (GARAMYCIN) 80 mg (2 mLs  Given 5/20/19 1701)   sodium chloride 0.9 % 1,000 mL with gentamicin (GARAMYCIN) 80 mg (  Given 5/20/19 1702)   bupivacaine (PF) (MARCAINE) 0.5 % injection (10 mLs Intradermal Given 5/20/19 1754)     I have evaluated this patient. My attending physician was available for consultation. Differential diagnosis includes but is not limited to subarachnoid hemorrhage, subdural hematoma, cervical spine fracture, extremity fracture versus dislocation, urinary tract infection, other.   She is nontoxic in appearance and hemodynamically stable. Plain films as interpreted by radiology reviewed by myself. She is in acute displaced left femoral neck fracture, right clavicle fracture that could be subacute. There is no evidence of neurovascular injury on exam.  She is going to need hospitalization. I put a page out to orthopedics and the hospitalist for admission. She has a white count of 12.4 with a left shift. Hemoglobin 11.8 and hematocrit 35.4. Sodium 134. BUN 21 with a creatinine of 0.9. GFR of 58. . Troponin less than 0.01. ProBNP 1089. No sign of infection so far on urinalysis. CT of the head and cervical spine as interpreted by radiology and reviewed by myself show no acute injuries. Large right knee laceration repair. Please see procedure note for details. [phone] with Celia nurse practitioner from orthopedics. Keep NPO. Multiple family members are at the bedside and they are updated on the plan of care. [phone] Dr. Rosalina Gomezs at bedside to see the patient. The family is not in the room at this time however he did evaluate the patient and he did have a picture of the laceration right before I started to repair it. Please see his note for photo of wound. FINAL IMPRESSION    1. Closed displaced fracture of left femoral neck (HCC)    2. Closed nondisplaced fracture of right clavicle, unspecified part of clavicle, initial encounter    3. Closed head injury, initial encounter    4. Right knee injury, initial encounter    5. Laceration of left lower extremity, initial encounter    6. Cervical strain, acute, initial encounter    7. Fall, initial encounter    8. Vertigo        PLAN  Admission.       (Please note that this note was completed with a voice recognition program.  Every attempt was made to edit the dictations, but inevitably there remain words that are mis-transcribed.)            Severiano Mcleod, CAMRON - RAHEL  05/23/19 0128

## 2019-05-20 NOTE — ED NOTES
Bed: B-08  Expected date: 5/20/19  Expected time: 7:57 AM  Means of arrival: [facility]  Comments:  Medic 51.  89F fall     Marcos Rodrigues RN  05/20/19 0174

## 2019-05-20 NOTE — ANESTHESIA PRE PROCEDURE
Department of Anesthesiology  Preprocedure Note       Name:  Odilia Martinez   Age:  80 y.o.  :  1922                                          MRN:  [id number]         Date:  2019      Surgeon: Larisa Acosta):  Reny Sevilla MD    Procedure: LEFT HIP HEMIARTHROPLASTY (Left )    Medications prior to admission:   Prior to Admission medications    Medication Sig Start Date End Date Taking? Authorizing Provider   pantoprazole (PROTONIX) 40 MG tablet Take 40 mg by mouth daily   Yes Historical Provider, MD   levothyroxine (SYNTHROID) 112 MCG tablet Take 112 mcg by mouth Daily   Yes Historical Provider, MD   furosemide (LASIX) 20 MG tablet Take 20 mg by mouth every 48 hours   Yes Historical Provider, MD   furosemide (LASIX) 40 MG tablet Take 40 mg by mouth every 48 hours   Yes Historical Provider, MD   amiodarone (PACERONE) 100 MG tablet TAKE 1 TABLET BY MOUTH EVERY DAY 19  Yes Jean Almeida DO   Omega-3 Fatty Acids (FISH OIL) 1000 MG CAPS Take 1,000 mg by mouth 2 times daily    Yes Historical Provider, MD   Cholecalciferol (VITAMIN D) 2000 units CAPS capsule Take 2,000 Units by mouth daily    Yes Historical Provider, MD   diclofenac sodium (VOLTAREN) 1 % GEL Apply 4 g topically 4 times daily  Patient taking differently: Apply 4 g topically 4 times daily as needed for Pain  18 Yes Maynor Pierce MD   senna-docusate (PERICOLACE) 8.6-50 MG per tablet Take 1 tablet by mouth daily   Yes Historical Provider, MD   potassium chloride (KLOR-CON M) 20 MEQ extended release tablet Take 1 tablet by mouth daily 3/12/18  Yes Maryam Greenberg MD   metroNIDAZOLE (METROGEL) 0.75 % gel Apply 1 g topically daily as needed (rosacea) Apply topically daily to face. Yes Historical Provider, MD   nystatin (MYCOSTATIN) 818074 UNIT/GM powder Apply 3 times daily. Patient taking differently: Apply 1 each topically 3 times daily as needed (skin folds) Apply 3 times daily.  5/3/17  Yes Coleen Haro, APRN - CNP diazepam (VALIUM) 2 MG tablet Take 1 tablet by mouth nightly as needed for Anxiety or Sleep 10/10/16  Yes Bárbara Avelar MD   aspirin 81 MG tablet Take 81 mg by mouth daily   Yes Historical Provider, MD       Current medications:    Current Facility-Administered Medications   Medication Dose Route Frequency Provider Last Rate Last Dose    lidocaine 1 % injection 5 mL  5 mL Intradermal Once CAMRON Pan CNP        clindamycin (CLEOCIN) 900 mg in dextrose 5 % 50 mL IVPB  900 mg Intravenous On Call to CAMRON Sun CNP         Current Outpatient Medications   Medication Sig Dispense Refill    pantoprazole (PROTONIX) 40 MG tablet Take 40 mg by mouth daily      levothyroxine (SYNTHROID) 112 MCG tablet Take 112 mcg by mouth Daily      furosemide (LASIX) 20 MG tablet Take 20 mg by mouth every 48 hours      furosemide (LASIX) 40 MG tablet Take 40 mg by mouth every 48 hours      amiodarone (PACERONE) 100 MG tablet TAKE 1 TABLET BY MOUTH EVERY DAY 90 tablet 1    Omega-3 Fatty Acids (FISH OIL) 1000 MG CAPS Take 1,000 mg by mouth 2 times daily       Cholecalciferol (VITAMIN D) 2000 units CAPS capsule Take 2,000 Units by mouth daily       diclofenac sodium (VOLTAREN) 1 % GEL Apply 4 g topically 4 times daily (Patient taking differently: Apply 4 g topically 4 times daily as needed for Pain ) 5 Tube 0    senna-docusate (PERICOLACE) 8.6-50 MG per tablet Take 1 tablet by mouth daily      potassium chloride (KLOR-CON M) 20 MEQ extended release tablet Take 1 tablet by mouth daily 90 tablet 3    metroNIDAZOLE (METROGEL) 0.75 % gel Apply 1 g topically daily as needed (rosacea) Apply topically daily to face.  nystatin (MYCOSTATIN) 637677 UNIT/GM powder Apply 3 times daily.  (Patient taking differently: Apply 1 each topically 3 times daily as needed (skin folds) Apply 3 times daily.) 30 g 3    diazepam (VALIUM) 2 MG tablet Take 1 tablet by mouth nightly as needed for Anxiety or Sleep 5 tablet 2    aspirin 81 MG tablet Take 81 mg by mouth daily         Allergies: Allergies   Allergen Reactions    Zithromax [Azithromycin] Diarrhea     Diarrhea/cramping    Erythromycin Other (See Comments)     Stomach cramps  Nausea and diarrhea      Amoxicillin Hives    Aspirin Other (See Comments)     Other reaction(s): Other (See Comments)  Upset stomach only if non coated  Upset stomach  Full dose causes upset stomach but able to take 81 mg dose       Problem List:    Patient Active Problem List   Diagnosis Code    Tendinitis M77.9    Primary localized osteoarthrosis, lower leg M17.10    Sinusitis J32.9    GERD (gastroesophageal reflux disease) K21.9    Anemia, pernicious D51.0    Fatigue R53.83    Carpal tunnel syndrome G56.00    Abnormal gait R26.9    Hypothyroidism E03.9    Essential hypertension I10    Low back pain radiating to right leg M54.5    Lumbar stenosis M48.061    Trochanteric bursitis of right hip M70.61    Urinary incontinence R32    Atrial fibrillation with RVR ([facility]) I48.91    Syncope and collapse R55    Nonrheumatic aortic valve stenosis I35.0    PAF (paroxysmal atrial fibrillation) ([facility]) I48.0    Anemia D64.9    Acute on chronic diastolic CHF (congestive heart failure), NYHA class 1 ([facility]) I50.33    Hypercholesteremia E78.00    Pneumonia  J18.9    Acute respiratory failure with hypoxia ([facility]) J96.01    Shortness of breath R06.02    Palpitations R00.2    S/P TAVR (transcatheter aortic valve replacement) Z95.2    Influenza J11.1    Chronic diastolic CHF (congestive heart failure) ([facility]) I50.32    Generalized weakness R53.1    Primary osteoarthritis of right knee M17.11    History of arthroplasty of left knee Z96.652    Greater trochanteric bursitis of left hip M70.62    Right knee pain M25.561    Arthritis of right knee M17.11    Contusion of left knee S80. 02XA    Left knee pain M25.562    Closed displaced fracture of left femoral neck ([address] S72.002A  Closed nondisplaced fracture of right clavicle S42.001A    Knee laceration, right, initial encounter S81.011A       Past Medical History:        Diagnosis Date    Anemia, pernicious     Arthritis     both hand (R worse than the L)    Cataract     both eyes    High blood pressure     previous history but off of medication for 1 year.     Hyperlipidemia     on statin    Hypothyroidism     oral supplementation    Macular degeneration     injection to left eye every 6 weeks    Melanoma ([address] 2014    lesions removed from nose and forehead    Nocturia     Urinary incontinence 2006    Vertigo 2006    treated with meclizine       Past Surgical History:        Procedure Laterality Date    CARDIOVERSION  2017    CATARACT REMOVAL  2010    bilateral removal    ENDOSCOPY, COLON, DIAGNOSTIC  16    Esophagogastroduodenoscopy with biopsy    INGUINAL HERNIA REPAIR  2007    JOINT REPLACEMENT  2008    left  knee     MALIGNANT SKIN LESION EXCISION      nose and forehead    PERCUTANEOUS BALLOON VALVULOPLASTY  2016    BAV       Social History:    Social History     Tobacco Use    Smoking status: Former Smoker     Last attempt to quit: 1950     Years since quittin.4    Smokeless tobacco: Never Used    Tobacco comment: social, quit over 50 years ago   Substance Use Topics    Alcohol use: No     Alcohol/week: 0.0 oz                                Counseling given: No  Comment: social, quit over 50 years ago      Vital Signs (Current):   Vitals:    19 0815 19 1055 19 1202   BP: (!) 157/65 (!) 151/119 (!) 171/70   Pulse: 66  60   Resp: 16  16   Temp: 97.7 °F (36.5 °C)  97.6 °F (36.4 °C)   TempSrc: Oral  Oral   SpO2: 92% 100% 97%   Weight: 136 lb 7.4 oz (61.9 kg)                                                BP Readings from Last 3 Encounters:   19 (!) 171/70   19 (!) 143/64   19 124/82       NPO Status: Time of last liquid consumption: (unknown ) Time of last solid consumption: 1630                        Date of last liquid consumption: 05/19/19                        Date of last solid food consumption: 05/19/19    BMI:   Wt Readings from Last 3 Encounters:   05/20/19 136 lb 7.4 oz (61.9 kg)   05/02/19 136 lb (61.7 kg)   03/07/19 136 lb (61.7 kg)     Body mass index is 22.71 kg/m². CBC:   Lab Results   Component Value Date    WBC 12.4 05/20/2019    RBC 3.71 05/20/2019    HGB 11.8 05/20/2019    HCT 35.4 05/20/2019    MCV 95.3 05/20/2019    RDW 17.2 05/20/2019     05/20/2019       CMP:   Lab Results   Component Value Date     05/20/2019    K 4.3 05/20/2019    K 3.9 11/14/2018    CL 99 05/20/2019    CO2 24 05/20/2019    BUN 21 05/20/2019    CREATININE 0.9 05/20/2019    GFRAA >60 05/20/2019    GFRAA >60 09/21/2012    AGRATIO 1.0 05/20/2019    LABGLOM 58 05/20/2019    GLUCOSE 104 05/20/2019    PROT 6.9 05/20/2019    PROT 7.4 09/21/2012    CALCIUM 8.8 05/20/2019    BILITOT 0.7 05/20/2019    ALKPHOS 97 05/20/2019    AST 24 05/20/2019    ALT 16 05/20/2019       POC Tests: No results for input(s): POCGLU, POCNA, POCK, POCCL, POCBUN, POCHEMO, POCHCT in the last 72 hours.     Coags:   Lab Results   Component Value Date    PROTIME 29.6 02/02/2019    INR 2.60 02/02/2019    APTT 43.5 12/28/2017       HCG (If Applicable): No results found for: PREGTESTUR, PREGSERUM, HCG, HCGQUANT     ABGs:   Lab Results   Component Value Date    PHART 7.348 07/05/2016    PO2ART 170 07/05/2016    CTY5UHT 48 07/05/2016    NZR2BPQ 26.3 07/05/2016    BEART 1 07/05/2016    C1DAFJGS 99 07/05/2016        Type & Screen (If Applicable):  No results found for: LABABO, [facility]    Anesthesia Evaluation  Patient summary reviewed no history of anesthetic complications:   Airway: Mallampati: II  TM distance: >3 FB   Neck ROM: limited  Mouth opening: > = 3 FB Dental:      Comment: No loose teeth    Pulmonary:   (+) shortness of breath:  decreased breath sounds,      (-) COPD, asthma, recent URI and sleep apnea                           Cardiovascular:    (+) hypertension:, valvular problems/murmurs (s/p TAVR): MR, dysrhythmias: atrial fibrillation, CHF:,       ECG reviewed  Rhythm: regular  Rate: normal  Echocardiogram reviewed                  Neuro/Psych:   (+) neuromuscular disease:, psychiatric history (memory loss/confusion):   (-) TIA and headaches           GI/Hepatic/Renal:   (+) GERD:,      (-) PUD, hepatitis, liver disease and no renal disease       Endo/Other:    (+) hypothyroidism, blood dyscrasia: arthritis:., .    (-) diabetes mellitus, hyperthyroidism               Abdominal:           Vascular:                                      Anesthesia Plan      general     ASA 4       Induction: intravenous. MIPS: Postoperative opioids intended and Prophylactic antiemetics administered. Anesthetic plan and risks discussed with patient, child/children and healthcare power of . Plan discussed with CRNA. This pre-anesthesia assessment may be used as a history and physical.    DOS STAFF ADDENDUM:    Pt seen and examined, chart reviewed (including anesthesia, drug and allergy history). No interval changes to history and physical examination. Anesthetic plan, risks, benefits, alternatives, and personnel involved discussed with patient. Patient verbalized an understanding and agrees to proceed.       Thad Fields MD  May 20, 2019  2:17 PM      Thad Fields MD   5/20/2019

## 2019-05-20 NOTE — H&P
Hospital Medicine History & Physical      PCP: Laura Pedersen DO    Date of Admission: 5/20/2019    Date of Service: Pt seen/examined on 5/20/2019 and Admitted to Inpatient       Chief Complaint:  Fall at home. History Of Present Illness: The patient is a 80 y.o. female w Hx of Hypothyroidism, HLD, macular degeneration, who presents to [facility] after a fall. Pt reports she got up in the middle of the night and as she was walking felt as if her legs gave out from under her and she fell. She thinks she \"knocked herself out\", albeit unable to tell me how long she may have been unconscious. When awakened she called for help using her life alert button. Her health aid helped her up to bed. She woke up next morning and noticed worsening pain in her left hip. Pt reports fall recently with resulting pain in her right shoulder - this was not evaluated prior. Past Medical History:        Diagnosis Date    Anemia, pernicious     Arthritis     both hand (R worse than the L)    Cataract     both eyes    High blood pressure     previous history but off of medication for 1 year.     Hyperlipidemia     on statin    Hypothyroidism     oral supplementation    Macular degeneration     injection to left eye every 6 weeks    Melanoma ([facility] 2014    lesions removed from nose and forehead    Nocturia     Urinary incontinence 2006    Vertigo 2006    treated with meclizine       Past Surgical History:        Procedure Laterality Date    CARDIOVERSION  01/2017    CATARACT REMOVAL  2010    bilateral removal    ENDOSCOPY, COLON, DIAGNOSTIC  5/23/16    Esophagogastroduodenoscopy with biopsy    INGUINAL HERNIA REPAIR  2007    JOINT REPLACEMENT  2008    left  knee     MALIGNANT SKIN LESION EXCISION  2010    nose and forehead    PERCUTANEOUS BALLOON VALVULOPLASTY  5/20/2016    BAV       Medications Prior to Admission:    Prior to Admission medications    Medication Sig Start Date End Date Taking? Authorizing Provider   pantoprazole (PROTONIX) 40 MG tablet Take 40 mg by mouth daily   Yes Historical Provider, MD   levothyroxine (SYNTHROID) 112 MCG tablet Take 112 mcg by mouth Daily   Yes Historical Provider, MD   furosemide (LASIX) 20 MG tablet Take 20 mg by mouth every 48 hours   Yes Historical Provider, MD   furosemide (LASIX) 40 MG tablet Take 40 mg by mouth every 48 hours   Yes Historical Provider, MD   folic acid (FOLVITE) 1 MG tablet Take 1 mg by mouth daily   Yes Historical Provider, MD   amiodarone (PACERONE) 100 MG tablet TAKE 1 TABLET BY MOUTH EVERY DAY 5/2/19  Yes Brigida Ceja DO   Omega-3 Fatty Acids (FISH OIL) 1000 MG CAPS Take 1,000 mg by mouth 2 times daily    Yes Historical Provider, MD   Cholecalciferol (VITAMIN D) 2000 units CAPS capsule Take 2,000 Units by mouth daily    Yes Historical Provider, MD   diclofenac sodium (VOLTAREN) 1 % GEL Apply 4 g topically 4 times daily  Patient taking differently: Apply 4 g topically 4 times daily as needed for Pain  11/14/18 5/20/19 Yes Xenia Carrillo MD   senna-docusate (PERICOLACE) 8.6-50 MG per tablet Take 1 tablet by mouth daily   Yes Historical Provider, MD   potassium chloride (KLOR-CON M) 20 MEQ extended release tablet Take 1 tablet by mouth daily 3/12/18  Yes Weston Jeter MD   metroNIDAZOLE (METROGEL) 0.75 % gel Apply 1 g topically daily as needed (rosacea) Apply topically daily to face. Yes Historical Provider, MD   nystatin (MYCOSTATIN) 967801 UNIT/GM powder Apply 3 times daily. Patient taking differently: Apply 1 each topically 3 times daily as needed (skin folds) Apply 3 times daily.  5/3/17  Yes CAMRON Argueta CNP   diazepam (VALIUM) 2 MG tablet Take 1 tablet by mouth nightly as needed for Anxiety or Sleep 10/10/16  Yes Justina Francois MD   aspirin 81 MG tablet Take 81 mg by mouth daily   Yes Historical Provider, MD       Allergies:  Zithromax [azithromycin]; Erythromycin; Amoxicillin; and Aspirin    Social History:  The patient currently lives at home. TOBACCO:   reports that she quit smoking about 69 years ago. She has never used smokeless tobacco.  ETOH:   reports that she does not drink alcohol. Family History:  Reviewed in detail and negative for DM, Early CAD, Cancer, CVA. Positive as follows:        Problem Relation Age of Onset    Cancer Mother     Heart Disease Father        REVIEW OF SYSTEMS:   As noted in the HPI. All other systems reviewed and negative. PHYSICAL EXAM:    BP (!) 98/58   Pulse 70   Temp 97.2 °F (36.2 °C) (Temporal)   Resp 16   Ht 5' 5\" (1.651 m)   Wt 136 lb 7.4 oz (61.9 kg)   SpO2 93%   BMI 22.71 kg/m²     General appearance: No apparent distress appears stated age and cooperative. HEENT Normal cephalic, atraumatic without obvious deformity. Pupils equal, round, and reactive to light. Extra ocular muscles intact. Conjunctivae/corneas clear. Neck: Supple, No jugular venous distention/bruits. Trachea midline without thyromegaly or adenopathy with full range of motion. Lungs: Clear to auscultation, bilaterally without Rales/Wheezes/Rhonchi with good respiratory effort. Heart: Regular rate and rhythm with Normal S1/S2 without murmurs, rubs or gallops, point of maximum impulse non-displaced  Abdomen: Soft, non-tender or non-distended without rigidity or guarding and positive bowel sounds all four quadrants. Extremities: right shoulder and clavicular pain to palpation. Left leg short and externally rotated. Skin: Skin color, texture, turgor normal.  No rashes or lesions. Neurologic: Alert and oriented X 3, neurovascularly intact with sensory/motor intact upper extremities/lower extremities, bilaterally. Cranial nerves: II-XII intact, grossly non-focal.  Mental status: Alert, oriented, thought content appropriate.   Capillary Refill: Acceptable  < 3 seconds  Peripheral Pulses: +3 Easily felt, not easily obliterated with pressure      CBC   Recent Labs     05/20/19 0914   WBC 12.4*   HGB 11.8*   HCT 35.4*         RENAL  Recent Labs     05/20/19 0914   *   K 4.3   CL 99   CO2 24   BUN 21*   CREATININE 0.9     LFT'S  Recent Labs     05/20/19 0914   AST 24   ALT 16   BILITOT 0.7   ALKPHOS 97     COAG  No results for input(s): INR in the last 72 hours. CARDIAC ENZYMES  Recent Labs     05/20/19 0914   CKTOTAL 179   TROPONINI <0.01       U/A:    Lab Results   Component Value Date    COLORU YELLOW 05/20/2019    WBCUA 1 05/20/2019    RBCUA 2 05/20/2019    CLARITYU Clear 05/20/2019    SPECGRAV 1.014 05/20/2019    LEUKOCYTESUR TRACE 05/20/2019    BLOODU Negative 05/20/2019    GLUCOSEU Negative 05/20/2019       ABG    Lab Results   Component Value Date    LQS1WNP 26.3 07/05/2016    BEART 1 07/05/2016    T8NNXJYY 99 07/05/2016    PHART 7.348 07/05/2016    XUZ7RZZ 48 07/05/2016    PO2ART 170 07/05/2016    ZSI4EFC 28 07/05/2016           Active Hospital Problems    Diagnosis Date Noted    Closed displaced fracture of left femoral neck ([facility] [S72.002A] 05/20/2019         PHYSICIANS CERTIFICATION:    I certify that Odilia Martinez is expected to be hospitalized for more than 2 midnights based on the following assessment and plan:      ASSESSMENT/PLAN:    Left Femoral Fracture. Ortho eval.   Pain control      R Clavicular Fracture - sling immobilizer. Ortho eval.   Pain control. Hypothyroidism - on synthroid. Afib Hx of Paroxysmal - on amiodarone. Used to be on coumadin - this was stopped per her primary cardiology Dr Kyara Vega due to recurrent falls. On ASA. PreOp -   EKG stable. High risk patient with Hx of Afib and advanced age. No angina. Proceed with surgery   Discussed with pt and family at length.    Full code status in the periop period  [facility] afterwards - she does not want prolonged life support if it ever got to that. DVT Prophylaxis: lovenox  Diet: Diet NPO Effective Now  Code Status: Full Code    Dispo - inpatient. Oliva Brown MD    Thank you Dani Mcgee DO for the opportunity to be involved in this patient's care. If you have any questions or concerns please feel free to contact me at [phone].

## 2019-05-20 NOTE — ED NOTES
Right knee LAC to be repaired in surgery per Orthopedic, NP.       Wilder Silver, ANTONY  05/20/19 0742

## 2019-05-20 NOTE — LETTER
May 20, 2019    [facility]  Consult Billing Form:      DEMOGRAPHICS:                                                                                                              .    Patient Name:  Compa Graham  Patient :  1922   Patient SS#:  [id number]    Patient Phone:  [phone] (home)  Alt. Patient Phone:    Patient Address:  [address] [place] [address]    PCP:  Kenneth Tai DO  Insurance:  Payor: Adriana Hernandez / Plan: MEDICARE PART A AND B / Product Type: *No Product type* /   Insurance ID Number:    DIAGNOSIS & PROCEDURE:                                                                                            .    Diagnosis:   Right clavicle fx, left hip fx, right knee laceration    Hospital:  [facility]    Provider:  Lisa FIGUEREDO    SCHEDULING INFORMATION:                                                                                         .     Date of Consultation:                              Lisa FIGUEREDO  19     BILLING INFORMATION:                                                                                                    .    Procedure:       CPT Code Modifier

## 2019-05-20 NOTE — PROGRESS NOTES
Received from OR. VSS. IVF kvo. Arouses easily. Frigidity upper extremities. Confusion. Bruising present entire body, face, extremities etc. Immobilizer right knee with dsg under immobilizer bloody break through drainage. Hip dsg intact, edema at left hip, ice pk left hip, pedal pulses present, cap refill brisk lower ext. No pain upon arrival. O2 in use.   Electronically signed by Irineo Angelucci, RN on 5/20/2019 at 6:54 PM

## 2019-05-21 LAB
ALBUMIN SERPL-MCNC: 3 G/DL (ref 3.4–5)
ANION GAP SERPL CALCULATED.3IONS-SCNC: 10 MMOL/L (ref 3–16)
BASOPHILS ABSOLUTE: 0 K/UL (ref 0–0.2)
BASOPHILS RELATIVE PERCENT: 0.2 %
BUN BLDV-MCNC: 21 MG/DL (ref 7–20)
CALCIUM SERPL-MCNC: 8.2 MG/DL (ref 8.3–10.6)
CHLORIDE BLD-SCNC: 102 MMOL/L (ref 99–110)
CO2: 23 MMOL/L (ref 21–32)
CREAT SERPL-MCNC: 0.8 MG/DL (ref 0.6–1.2)
EKG ATRIAL RATE: 66 BPM
EKG DIAGNOSIS: NORMAL
EKG P AXIS: 81 DEGREES
EKG P-R INTERVAL: 210 MS
EKG Q-T INTERVAL: 476 MS
EKG QRS DURATION: 130 MS
EKG QTC CALCULATION (BAZETT): 499 MS
EKG R AXIS: -49 DEGREES
EKG T AXIS: 90 DEGREES
EKG VENTRICULAR RATE: 66 BPM
EOSINOPHILS ABSOLUTE: 0 K/UL (ref 0–0.6)
EOSINOPHILS RELATIVE PERCENT: 0.1 %
GFR AFRICAN AMERICAN: >60
GFR NON-AFRICAN AMERICAN: >60
GLUCOSE BLD-MCNC: 141 MG/DL (ref 70–99)
HCT VFR BLD CALC: 24.4 % (ref 36–48)
HEMOGLOBIN: 8.2 G/DL (ref 12–16)
LYMPHOCYTES ABSOLUTE: 0.8 K/UL (ref 1–5.1)
LYMPHOCYTES RELATIVE PERCENT: 6.6 %
MCH RBC QN AUTO: 31.9 PG (ref 26–34)
MCHC RBC AUTO-ENTMCNC: 33.4 G/DL (ref 31–36)
MCV RBC AUTO: 95.6 FL (ref 80–100)
MONOCYTES ABSOLUTE: 0.6 K/UL (ref 0–1.3)
MONOCYTES RELATIVE PERCENT: 5.6 %
NEUTROPHILS ABSOLUTE: 10 K/UL (ref 1.7–7.7)
NEUTROPHILS RELATIVE PERCENT: 87.5 %
PDW BLD-RTO: 17.3 % (ref 12.4–15.4)
PHOSPHORUS: 4.4 MG/DL (ref 2.5–4.9)
PLATELET # BLD: 128 K/UL (ref 135–450)
PMV BLD AUTO: 7.7 FL (ref 5–10.5)
POTASSIUM SERPL-SCNC: 4.2 MMOL/L (ref 3.5–5.1)
RBC # BLD: 2.55 M/UL (ref 4–5.2)
SODIUM BLD-SCNC: 135 MMOL/L (ref 136–145)
WBC # BLD: 11.5 K/UL (ref 4–11)

## 2019-05-21 PROCEDURE — 97166 OT EVAL MOD COMPLEX 45 MIN: CPT

## 2019-05-21 PROCEDURE — 6370000000 HC RX 637 (ALT 250 FOR IP): Performed by: INTERNAL MEDICINE

## 2019-05-21 PROCEDURE — 93010 ELECTROCARDIOGRAM REPORT: CPT | Performed by: INTERNAL MEDICINE

## 2019-05-21 PROCEDURE — 97530 THERAPEUTIC ACTIVITIES: CPT

## 2019-05-21 PROCEDURE — 6360000002 HC RX W HCPCS: Performed by: INTERNAL MEDICINE

## 2019-05-21 PROCEDURE — 1200000000 HC SEMI PRIVATE

## 2019-05-21 PROCEDURE — 2500000003 HC RX 250 WO HCPCS: Performed by: ORTHOPAEDIC SURGERY

## 2019-05-21 PROCEDURE — 85025 COMPLETE CBC W/AUTO DIFF WBC: CPT

## 2019-05-21 PROCEDURE — 2580000003 HC RX 258: Performed by: INTERNAL MEDICINE

## 2019-05-21 PROCEDURE — 6370000000 HC RX 637 (ALT 250 FOR IP): Performed by: NURSE PRACTITIONER

## 2019-05-21 PROCEDURE — 97162 PT EVAL MOD COMPLEX 30 MIN: CPT

## 2019-05-21 PROCEDURE — 6370000000 HC RX 637 (ALT 250 FOR IP): Performed by: ORTHOPAEDIC SURGERY

## 2019-05-21 PROCEDURE — 80069 RENAL FUNCTION PANEL: CPT

## 2019-05-21 PROCEDURE — 2700000000 HC OXYGEN THERAPY PER DAY

## 2019-05-21 PROCEDURE — 94760 N-INVAS EAR/PLS OXIMETRY 1: CPT

## 2019-05-21 PROCEDURE — 36415 COLL VENOUS BLD VENIPUNCTURE: CPT

## 2019-05-21 RX ORDER — HYDROCODONE BITARTRATE AND ACETAMINOPHEN 5; 325 MG/1; MG/1
1 TABLET ORAL EVERY 6 HOURS PRN
Qty: 30 TABLET | Refills: 0 | Status: SHIPPED | OUTPATIENT
Start: 2019-05-21 | End: 2019-05-28

## 2019-05-21 RX ORDER — LACTOBACILLUS RHAMNOSUS GG 10B CELL
1 CAPSULE ORAL
Status: DISCONTINUED | OUTPATIENT
Start: 2019-05-21 | End: 2019-05-25 | Stop reason: HOSPADM

## 2019-05-21 RX ORDER — ASPIRIN 81 MG/1
81 TABLET ORAL 2 TIMES DAILY
Qty: 60 TABLET | Refills: 0 | Status: SHIPPED | OUTPATIENT
Start: 2019-05-21 | End: 2019-07-02

## 2019-05-21 RX ORDER — ACETAMINOPHEN 325 MG/1
650 TABLET ORAL EVERY 8 HOURS SCHEDULED
Status: DISCONTINUED | OUTPATIENT
Start: 2019-05-21 | End: 2019-05-25 | Stop reason: HOSPADM

## 2019-05-21 RX ADMIN — ACETAMINOPHEN 650 MG: 325 TABLET ORAL at 22:27

## 2019-05-21 RX ADMIN — HYDROCODONE BITARTRATE AND ACETAMINOPHEN 1 TABLET: 5; 325 TABLET ORAL at 23:35

## 2019-05-21 RX ADMIN — FOLIC ACID 1 MG: 1 TABLET ORAL at 08:30

## 2019-05-21 RX ADMIN — ASPIRIN 81 MG 81 MG: 81 TABLET ORAL at 08:31

## 2019-05-21 RX ADMIN — DOCUSATE SODIUM 100 MG: 100 CAPSULE, LIQUID FILLED ORAL at 22:28

## 2019-05-21 RX ADMIN — PANTOPRAZOLE SODIUM 40 MG: 40 TABLET, DELAYED RELEASE ORAL at 05:18

## 2019-05-21 RX ADMIN — Medication 1 CAPSULE: at 10:19

## 2019-05-21 RX ADMIN — ACETAMINOPHEN 650 MG: 325 TABLET ORAL at 14:31

## 2019-05-21 RX ADMIN — LEVOTHYROXINE SODIUM 112 MCG: 112 TABLET ORAL at 05:18

## 2019-05-21 RX ADMIN — VITAMIN D, TAB 1000IU (100/BT) 2000 UNITS: 25 TAB at 08:31

## 2019-05-21 RX ADMIN — ENOXAPARIN SODIUM 40 MG: 40 INJECTION SUBCUTANEOUS at 08:31

## 2019-05-21 RX ADMIN — CLINDAMYCIN PHOSPHATE 900 MG: 900 INJECTION, SOLUTION INTRAVENOUS at 08:31

## 2019-05-21 RX ADMIN — HYDROCODONE BITARTRATE AND ACETAMINOPHEN 1 TABLET: 5; 325 TABLET ORAL at 11:08

## 2019-05-21 RX ADMIN — Medication 10 ML: at 22:33

## 2019-05-21 RX ADMIN — AMIODARONE HYDROCHLORIDE 100 MG: 200 TABLET ORAL at 08:30

## 2019-05-21 RX ADMIN — HYDROCODONE BITARTRATE AND ACETAMINOPHEN 1 TABLET: 5; 325 TABLET ORAL at 05:19

## 2019-05-21 RX ADMIN — DOCUSATE SODIUM 100 MG: 100 CAPSULE, LIQUID FILLED ORAL at 08:30

## 2019-05-21 RX ADMIN — SENNOSIDES, DOCUSATE SODIUM 1 TABLET: 50; 8.6 TABLET, FILM COATED ORAL at 08:30

## 2019-05-21 ASSESSMENT — PAIN DESCRIPTION - ORIENTATION
ORIENTATION: LEFT;RIGHT
ORIENTATION: LEFT
ORIENTATION_2: RIGHT
ORIENTATION_2: RIGHT
ORIENTATION: LEFT
ORIENTATION: LEFT
ORIENTATION_2: RIGHT
ORIENTATION: LEFT
ORIENTATION: LEFT
ORIENTATION_2: RIGHT
ORIENTATION: LEFT
ORIENTATION: LEFT
ORIENTATION_2: RIGHT
ORIENTATION: LEFT
ORIENTATION: LEFT

## 2019-05-21 ASSESSMENT — PAIN - FUNCTIONAL ASSESSMENT

## 2019-05-21 ASSESSMENT — PAIN DESCRIPTION - LOCATION
LOCATION: HIP
LOCATION: HIP
LOCATION_2: KNEE
LOCATION: HIP
LOCATION_2: KNEE
LOCATION: HIP;KNEE
LOCATION: HIP
LOCATION_2: KNEE
LOCATION: HIP
LOCATION_2: KNEE
LOCATION: HIP
LOCATION: KNEE
LOCATION: HIP
LOCATION_2: KNEE

## 2019-05-21 ASSESSMENT — PAIN DESCRIPTION - DESCRIPTORS
DESCRIPTORS: ACHING
DESCRIPTORS_2: ACHING
DESCRIPTORS: ACHING
DESCRIPTORS_2: ACHING
DESCRIPTORS: ACHING
DESCRIPTORS_2: ACHING
DESCRIPTORS: ACHING
DESCRIPTORS: ACHING

## 2019-05-21 ASSESSMENT — PAIN SCALES - GENERAL
PAINLEVEL_OUTOF10: 5
PAINLEVEL_OUTOF10: 7
PAINLEVEL_OUTOF10: 0
PAINLEVEL_OUTOF10: 4
PAINLEVEL_OUTOF10: 7
PAINLEVEL_OUTOF10: 4
PAINLEVEL_OUTOF10: 4
PAINLEVEL_OUTOF10: 5
PAINLEVEL_OUTOF10: 5

## 2019-05-21 ASSESSMENT — PAIN DESCRIPTION - FREQUENCY
FREQUENCY: CONTINUOUS

## 2019-05-21 ASSESSMENT — PAIN DESCRIPTION - ONSET
ONSET_2: ON-GOING
ONSET: ON-GOING
ONSET_2: ON-GOING
ONSET: ON-GOING
ONSET_2: ON-GOING
ONSET_2: ON-GOING
ONSET: ON-GOING
ONSET_2: ON-GOING
ONSET: ON-GOING

## 2019-05-21 ASSESSMENT — PAIN DESCRIPTION - DURATION
DURATION_2: CONTINUOUS

## 2019-05-21 ASSESSMENT — PAIN DESCRIPTION - PAIN TYPE
TYPE_2: ACUTE PAIN
TYPE: SURGICAL PAIN
TYPE_2: ACUTE PAIN
TYPE: SURGICAL PAIN
TYPE: ACUTE PAIN
TYPE_2: ACUTE PAIN
TYPE_2: ACUTE PAIN
TYPE: SURGICAL PAIN;ACUTE PAIN
TYPE_2: ACUTE PAIN
TYPE: SURGICAL PAIN

## 2019-05-21 ASSESSMENT — PAIN DESCRIPTION - PROGRESSION
CLINICAL_PROGRESSION: GRADUALLY WORSENING
CLINICAL_PROGRESSION: GRADUALLY WORSENING
CLINICAL_PROGRESSION: GRADUALLY IMPROVING
CLINICAL_PROGRESSION: GRADUALLY IMPROVING
CLINICAL_PROGRESSION: GRADUALLY WORSENING
CLINICAL_PROGRESSION_2: GRADUALLY WORSENING
CLINICAL_PROGRESSION: GRADUALLY WORSENING
CLINICAL_PROGRESSION_2: GRADUALLY IMPROVING
CLINICAL_PROGRESSION_2: GRADUALLY IMPROVING
CLINICAL_PROGRESSION: GRADUALLY IMPROVING
CLINICAL_PROGRESSION_2: GRADUALLY WORSENING
CLINICAL_PROGRESSION: GRADUALLY WORSENING
CLINICAL_PROGRESSION: GRADUALLY IMPROVING
CLINICAL_PROGRESSION_2: GRADUALLY WORSENING

## 2019-05-21 ASSESSMENT — PAIN DESCRIPTION - INTENSITY
RATING_2: 0
RATING_2: 5
RATING_2: 7
RATING_2: 5
RATING_2: 4

## 2019-05-21 NOTE — PROGRESS NOTES
Patient in bed, reports pain as 4/10 on the numerical pain scale. She is alert and knows she is in the hospital related to a fall. The immobilizer in in place on the right leg. The left hip dressing is clean, dry, intact. Carvajal has been removed by night shift. Vitals stable. Call light in reach.

## 2019-05-21 NOTE — PROGRESS NOTES
[facility] Orthopedic Surgery  Consult Note        I have personally seen and examined this patient. The patient was seen for left hip displaced femoral neck fracture and right distal clavicle minimally displaced fracture. I have fully participated in the care of this patient. I have reviewed and agree with all pertinent clinical information including history, physical exam, diagnostic testing and the plan by [facility]. I discussed with Kade Enriquez and her family the treatment options and that the overall alignment of the clavicle fracture is good and we can try to treat this non-operatively in a sling for comfort. We discussed the risk of nonunion and or malunion. I also discussed with Kade Enriquez and her family the overall alignment of the left hip fracture and treatment options including both surgical and non-surgical treatment, and that my recommendation is left hip hemiarthroplasty given the amount of displacement and comminution of the fracture. I discussed the risks and benefits of surgery with the patient, including but not limited to infection, bleeding, pain, injury to nerves or blood vessels failure of the surgery and need for additional surgery. All the patient's questions were answered. We discussed an expected post-operative course. She  is understanding of this and wishes to proceed.        Berny Baird MD 5/20/2019 1:00 PM

## 2019-05-21 NOTE — PROGRESS NOTES
Patient in bed, family at the bedside. She is confused, and seeing people that are not in the room, she is not eating much and fluids have been encouraged. Pure wick in place. Family at the bedside. Vitals stable.

## 2019-05-21 NOTE — PROGRESS NOTES
Hospitalist Progress Note      PCP: Dani Mcgee DO    Date of Admission: 5/20/2019    Chief Complaint: fell at assisted living. Admitted with left hip and right clavicular factures. Subjective:     Pain mostly in R shoulder this am.       Medications:  Reviewed    Infusion Medications   Scheduled Medications    lactobacillus  1 capsule Oral Daily with breakfast    acetaminophen  650 mg Oral 3 times per day    sodium chloride flush  10 mL Intravenous 2 times per day    enoxaparin  40 mg Subcutaneous Daily    docusate sodium  100 mg Oral BID    amiodarone  100 mg Oral Daily    aspirin  81 mg Oral Daily    vitamin D  2,000 Units Oral Daily    folic acid  1 mg Oral Daily    levothyroxine  112 mcg Oral Daily    pantoprazole  40 mg Oral QAM AC    sennosides-docusate sodium  1 tablet Oral Daily     PRN Meds: morphine, HYDROcodone 5 mg - acetaminophen, sodium chloride flush, magnesium hydroxide, ondansetron      Intake/Output Summary (Last 24 hours) at 5/21/2019 1356  Last data filed at 5/21/2019 0940  Gross per 24 hour   Intake 1555 ml   Output 875 ml   Net 680 ml       Physical Exam Performed:    /71   Pulse 59   Temp 97.6 °F (36.4 °C) (Oral)   Resp 16   Ht 5' 5\" (1.651 m)   Wt 138 lb 7.2 oz (62.8 kg)   SpO2 98%   BMI 23.04 kg/m²        General appearance: No apparent distress appears stated age and cooperative. HEENT Normal cephalic, atraumatic without obvious deformity. Pupils equal, round, and reactive to light. Extra ocular muscles intact. Conjunctivae/corneas clear. Neck: Supple, No jugular venous distention/bruits. Trachea midline without thyromegaly or adenopathy with full range of motion. Lungs: Clear to auscultation, bilaterally without Rales/Wheezes/Rhonchi with good respiratory effort.   Heart: Regular rate and rhythm with Normal S1/S2 without murmurs, rubs or gallops, point of maximum impulse non-displaced  Abdomen: Soft, non-tender or non-distended without rigidity or guarding and positive bowel sounds all four quadrants. Extremities: right shoulder and clavicular pain to palpation. Left leg short and externally rotated. Skin: Skin color, texture, turgor normal.  No rashes or lesions. Neurologic: Alert and oriented X 3, neurovascularly intact with sensory/motor intact upper extremities/lower extremities, bilaterally. Cranial nerves: II-XII intact, grossly non-focal.  Mental status: Alert, oriented, thought content appropriate. Capillary Refill: Acceptable  < 3 seconds  Peripheral Pulses: +3 Easily felt, not easily obliterated with pressure           Labs:   Recent Labs     05/20/19 0914 05/21/19 0617   WBC 12.4* 11.5*   HGB 11.8* 8.2*   HCT 35.4* 24.4*    128*     Recent Labs     05/20/19 0914 05/21/19 0617   * 135*   K 4.3 4.2   CL 99 102   CO2 24 23   BUN 21* 21*   CREATININE 0.9 0.8   CALCIUM 8.8 8.2*   PHOS  --  4.4     Recent Labs     05/20/19  0914   AST 24   ALT 16   BILITOT 0.7   ALKPHOS 97     No results for input(s): INR in the last 72 hours. Recent Labs     05/20/19  0914   CKTOTAL 179   TROPONINI <0.01       Urinalysis:      Lab Results   Component Value Date    NITRU Negative 05/20/2019    WBCUA 1 05/20/2019    RBCUA 2 05/20/2019    BLOODU Negative 05/20/2019    SPECGRAV 1.014 05/20/2019    GLUCOSEU Negative 05/20/2019       Radiology:  XR HIP 2-3 VW W PELVIS LEFT   Final Result   Postoperative changes related to interval placement of left hip arthroplasty   hardware. No significant periprosthetic lucency or acute osseous abnormality   identified. CT Head WO Contrast   Final Result   No acute intracranial abnormality. CT Cervical Spine WO Contrast   Final Result   No acute abnormality of the cervical spine. XR SHOULDER RIGHT (MIN 2 VIEWS)   Final Result   Right distal clavicular fracture. This may be subacute to chronic. Degenerative changes in the glenohumeral joint.   No evidence of glenohumeral dislocation. XR CHEST PORTABLE   Final Result   No acute process. Unchanged mild pulmonary vascular congestion. XR FEMUR LEFT (MIN 2 VIEWS)   Final Result   Left femur and hip: Acute displaced fracture of the left femoral neck. XR HIP LEFT (2-3 VIEWS)   Final Result   Left femur and hip: Acute displaced fracture of the left femoral neck. XR KNEE RIGHT (1-2 VIEWS)   Final Result   Soft tissue swelling and joint effusion without evidence of fracture. Advanced tricompartmental osteoarthritis. Assessment/Plan:    Active Hospital Problems    Diagnosis    Closed displaced fracture of left femoral neck ([address] [S72.002A]       Left Femoral Fracture. S/p Left femoral arthroplasty 5/20  Pain control  PTOT  Will need ECF stay considering concurrent right shoulder fracture.         R Clavicular Fracture - sling immobilizer. Ortho eval.   Pain control.         Hypothyroidism - on synthroid.         Afib Hx of Paroxysmal - on amiodarone. Used to be on coumadin - this was stopped per her primary cardiology Dr Vinicio Stokes due to recurrent falls. On ASA.         PreOp -   EKG stable. High risk patient with Hx of Afib and advanced age. No angina. Proceed with surgery   Discussed with pt and family at length. Code status to [facility].    Tolerated surgery well.           DVT Prophylaxis: lovenox  Diet: Diet General  Code Status: Full Code     Dispo - inpatient.          Onur Acosta MD

## 2019-05-21 NOTE — PLAN OF CARE
Problem: Falls - Risk of:  Goal: Will remain free from falls  Description  Will remain free from falls  Outcome: Ongoing  Note:   Fall risk assessment completed . Fall precautions in place, bed/ chair alarm on, side rails 2/4 up, call light in reach, educated pt on calling for assistance when needed, room clear of clutter. Pt verbalized understanding. Problem: Falls - Risk of:  Goal: Absence of physical injury  Description  Absence of physical injury  Outcome: Ongoing     Problem: Risk for Impaired Skin Integrity  Goal: Tissue integrity - skin and mucous membranes  Description  Structural intactness and normal physiological function of skin and  mucous membranes. Outcome: Ongoing  Note:   Skin assessment completed every shift. Pt assessed for incontinence, appropriate barrier cream applied prn. Pt encouraged to turn/rotate every 2 hours. Assistance provided if pt unable to do so themselves. Problem: Pain:  Goal: Pain level will decrease  Description  Pain level will decrease  Outcome: Ongoing     Problem: Pain:  Goal: Control of acute pain  Description  Control of acute pain  Outcome: Ongoing  Note:   Pain/discomfort being managed with PRN analgesics per MD orders. Pt able to express presence and absence of pain and rate pain appropriately using numerical scale.       Problem: Pain:  Goal: Control of chronic pain  Description  Control of chronic pain  Outcome: Ongoing

## 2019-05-21 NOTE — PROGRESS NOTES
Physical Therapy    Facility/Department: [address] ORTHOPEDICS  Initial Assessment    NAME: El Rogers  : 1922  MRN: [id number]    Date of Service: 2019    Discharge Recommendations:  El Rogers scored a 7/24 on the AM-PAC short mobility form. Current research shows that an AM-PAC score of 17 or less is typically not associated with a discharge to the patient's home setting. Based on the patients AM-PAC score and their current functional mobility deficits, it is recommended that the patient have 3-5 sessions per week of Physical Therapy at d/c to increase the patients independence. Assessment  Pt needs continued PTOT and nursing care in a skilled setting. Anticipate will need slow pace. Body structures, Functions, Activity limitations: Decreased functional mobility ; Decreased ADL status; Decreased ROM; Decreased strength;Decreased balance  Prognosis: Fair  Decision Making: Medium Complexity  REQUIRES PT FOLLOW UP: Yes  Activity Tolerance  Activity Tolerance: Patient limited by pain; Patient limited by endurance       Patient Diagnosis(es): The primary encounter diagnosis was Closed displaced fracture of left femoral neck ([address] Diagnoses of Closed nondisplaced fracture of right clavicle, unspecified part of clavicle, initial encounter, Closed head injury, initial encounter, Right knee injury, initial encounter, Laceration of left lower extremity, initial encounter, Cervical strain, acute, initial encounter, and Fall, initial encounter were also pertinent to this visit. has a past medical history of Anemia, pernicious, Arthritis, Cataract, High blood pressure, Hyperlipidemia, Hypothyroidism, Macular degeneration, Melanoma ([address] Nocturia, Urinary incontinence, and Vertigo. has a past surgical history that includes joint replacement (); Inguinal hernia repair (); Cataract removal (); malignant skin lesion excision ();  Endoscopy, colon, diagnostic (16); percutaneous balloon valvuloplasty (5/20/2016); and Cardioversion (01/2017). Restrictions  Restrictions/Precautions  Restrictions/Precautions: Fall Risk  Position Activity Restriction  Other position/activity restrictions: RUE in sling no abduction. RLE in knee immobilizer d/t laceration. LLE WBAT anterolateral approach. Vision/Hearing  Vision: Impaired  Vision Exceptions: Wears glasses at all times  Hearing: Exceptions to [facility]  Hearing Exceptions: Bilateral hearing aid;Hard of hearing/hearing concerns     Subjective  General  Chart Reviewed: Yes  Patient assessed for rehabilitation services?: Yes  Additional Pertinent Hx: Pt here due to fall in assisted living facility resulting in L hip fx, R clavicle fx, R knee laceration. Family / Caregiver Present: No  Follows Commands: Within Functional Limits(With some repetition due to [facility])  Subjective  Subjective: Arrived in room with pt awake. No acute distress. Agreeable with PTOT intake. Pain Screening  Patient Currently in Pain: Yes(Pain in L hip, R knee, R shoulder area)       Orientation  Orientation  Overall Orientation Status: Impaired(Oriented to self and situation)  Social/Functional History  Social/Functional History  Lives With: Alone  Type of Home: Assisted living(Believed to be AL not skilled unit )  Home Layout: One level  Home Access: Level entry  Bathroom Shower/Tub: Walk-in shower  Bathroom Toilet: Handicap height  ADL Assistance: (Reports assistance at times )  Homemaking Assistance: (Anticipate assist)  Ambulation Assistance: (Reports she has been ambulating in the last month, unable to describe use of device)  Additional Comments: Pt is disoriented and having difficulty answering PLOF info at this time. Would benefit from clarification of level of assistance provided. Objective  ROM and strength not formally assessed.   Motor Control  Gross Motor?: WFL(Not formally assessed)     Bed mobility  Supine to Sit: Dependent/Total  Sit to Supine: Dependent/Total  Scooting: Dependent/Total  Transfers  Sit to Stand: Unable to assess  Stand to sit: Unable to assess  Ambulation  Ambulation?: No  Stairs/Curb  Stairs?: No     Balance  Comments: Midline sitting with mod assist of 2 due to R knee immoblizer and R sling for UE. Exercises  Comments: Encouraged deep breathing     Plan   Plan  Times per week: QD x1 3-5  Current Treatment Recommendations: Transfer Training, Functional Mobility Training, Strengthening, Modalities, Positioning, Patient/Caregiver Education & Training  Safety Devices  Type of devices:  All fall risk precautions in place, Bed alarm in place, Call light within reach, Left in bed, Nurse notified(Fior Retana)    AM-PAC Score  AM-PAC Inpatient Mobility Raw Score : 7  AM-PAC Inpatient T-Scale Score : 26.42  Mobility Inpatient CMS 0-100% Score: 92.36  Mobility Inpatient CMS G-Code Modifier : CM    Goals  Short term goals  Time Frame for Short term goals: 1-2 days  Short term goal 1: Bed mobility at max assist  Short term goal 2: Transfers at max assist x2  Short term goal 3: Ambulation goals deferred to next level of care  Short term goal 4: Sit EOB x10 minutes at min assist  Patient Goals   Patient goals : Decrease pain       Therapy Time   Individual Concurrent Group Co-treatment   Time In 0815         Time Out 0855         Minutes 111 Miguelito Grant, PT

## 2019-05-21 NOTE — OP NOTE
[address]                                OPERATIVE REPORT    PATIENT NAME: Patsy Sanchez                   :        1922  MED REC NO:   [id number]                          ROOM:       9884  ACCOUNT NO:   [de-identified]                           ADMIT DATE: 2019  PROVIDER:     Krzysztof Colunga MD      DATE OF PROCEDURE:  2019    PRIMARY CARE PHYSICIAN:  Mikey Magallon DO    PREOPERATIVE DIAGNOSIS:  Left hip displaced femoral neck fracture. POSTOPERATIVE DIAGNOSIS:  Left hip displaced femoral neck fracture. OPERATION PERFORMED:  Open treatment of left femoral neck fracture with  bipolar press-fit hemiarthroplasty. SURGEON:  Krzysztof Colunga MD    ASSISTANTS:  Princess Finney CNP and Dani Patel SA    ANESTHESIA:  General anesthesia. ESTIMATED BLOOD LOSS:  100 mL. COMPLICATIONS:  None. APPROACH:  Anterolateral approach. IMPLANTS USED:  Donnie Accolade II, stem size #5 with hip size 45-mm  bipolar, 0 offset. INDICATIONS:  This is a 51-year-old white female who lives in an  assisted living who usually ambulates with a walker. She sustained a  fall with multiple injures. She sustained a left hip displaced femoral  neck fracture and right clavicle distal part minimally displaced  fracture as well as laceration to her right knee. She was then brought  to [facility] ER where she was evaluated and was confirmed of injury. The right knee was cleaned and sutured in the ER. The right clavicle  will be treated closed without manipulation. All risks, benefits, and  alternatives were discussed with the patient and her family for the hip  hemiarthroplasty and they agreed to proceed with surgical treatment. OPERATIVE PROCEDURE:  The patient's left hip was marked. She received  900 mg of clindamycin IV preoperatively.   The patient was then brought  to the operating room and underwent general anesthesia. The patient was  then placed in the right lateral decubitus position with the left hip  up. Axillary roll was placed to protect the right shoulder. The left  hip and lower extremity were then prepped and draped in regular sterile  routine fashion. A time-out was called confirming the patient.s name,  site and procedure. We used an anterolateral approach. An incision was made over the  lateral aspect of the left hip. The fascia aida was then incised the  length of the incision. We then elevated the anterior third of the  gluteus medius and vastus lateralis using the cautery. It was taken  down sharply elevating the gluteus medius and capsulotomy was done. We  then exposed the femoral neck fracture. We used the saw to cut the  femoral neck at the appropriate height. At this point, we prepared the  proximal femur with a , canal finder, lateralizing reamer, and  sequential broaching was performed and size #5 stem Accolade II was  found to be appropriate size stem. At this point, we removed the femoral head and measured 45 mm. We  irrigated the acetabulum copiously with the PulsaVac using normal saline  mixed with gentamicin. We removed all bony fragments. At this point,  we inserted the stem, which was the Accolade II, stem size #5 with good  press-fit. We elected to proceed with a 0 offset bipolar head size  45-mm, 26-mm inner head which was placed and reduced and was found to be  stable up to 90 degrees of external rotation. At this point, we irrigated the hip joint copiously with normal saline  mixed with gentamicin using PulsaVac. We then closed the capsule with  #2 Ethibond. We repaired the vastus lateralis, gluteus medius and  minimus back to the greater trochanter using #5 Ti-Cron through a drill  hole. We then reinforced the repair with #2 Ethibond and #1 Vicryl. The fascia aida was then closed with #2 Jacqualin Able in a running fashion.    Subcu was then irrigated with PulsaVac, was closed with 2-0 and 3-0  Vicryl and skin with 4-0 Monocryl. Steri-Strips were then applied. Dressing was then applied in the form of Mepilex. The patient tolerated the procedure well and was taken to the recovery  in stable condition. Craig Osgood, CNP was 1st Assist given the nature of the procedure that needed advanced assistance. POSTOPERATIVE PLAN:  The patient will be readmitted as an inpatient. We  will start PT/OT with weightbearing as tolerated on the left lower  extremity. The patient will be nonweightbearing on the right shoulder  because of the clavicle fracture. She will need an extended care  facility placement.         Clotilde De Leon MD    D: 05/20/2019 22:22:13       T: 05/21/2019 4:29:47     [email or web address]  Job#: [id number]     Doc#: [id number]    CC:  Tammie Tolbert DO

## 2019-05-21 NOTE — PLAN OF CARE
Problem: Falls - Risk of:  Goal: Will remain free from falls  Description  Will remain free from falls  5/21/2019 1515 by Wilner Leblanc RN  Outcome: Met This Shift  Note:   Patient has been assessed for fall risk, fall precautions are in place, environment has been cleared of obstacles and reassessed during rounding, bed is in lowest position with the wheels locked, call light is within reach. ID band has been verified, appropriate transfer methods have been utilized and patient has been educated on safety  5/21/2019 0805 by Yon Doty RN  Outcome: Ongoing  Note:   Fall risk assessment completed . Fall precautions in place, bed/ chair alarm on, side rails 2/4 up, call light in reach, educated pt on calling for assistance when needed, room clear of clutter. Pt verbalized understanding. Problem: Risk for Impaired Skin Integrity  Goal: Tissue integrity - skin and mucous membranes  Description  Structural intactness and normal physiological function of skin and  mucous membranes. 5/21/2019 1515 by Wilner Leblanc RN  Outcome: Met This Shift  Note:   Patient has been assessed for skin breakdown, patient has been turned frequently, pillow support to protect bony prominences, shearing avoided by using  pad and 2 staff to move  5/21/2019 0805 by Yon Doty RN  Outcome: Ongoing  Note:   Skin assessment completed every shift. Pt assessed for incontinence, appropriate barrier cream applied prn. Pt encouraged to turn/rotate every 2 hours. Assistance provided if pt unable to do so themselves.         Problem: Pain:  Goal: Pain level will decrease  Description  Pain level will decrease  5/21/2019 1515 by Wilner Leblanc RN  Outcome: Met This Shift  Note:   Patient has been assessed for pain on a regular bases, patient has been given pain medication as appropriate, patient has been reassessed for pain after medication has been administered   5/21/2019 0805 by Yno Doty RN  Outcome: Ongoing  Goal: Control of acute pain  Description  Control of acute pain  5/21/2019 1515 by Sean Wahl RN  Outcome: Met This Shift  5/21/2019 0805 by Briana Alford RN  Outcome: Ongoing  Note:   Pain/discomfort being managed with PRN analgesics per MD orders. Pt able to express presence and absence of pain and rate pain appropriately using numerical scale.

## 2019-05-21 NOTE — PROGRESS NOTES
Patient admitted to room [phone] from PACU. Pt sleeping at this time. Family oriented to room and routine.

## 2019-05-21 NOTE — CARE COORDINATION
Jay spoke with patient and daughter, Denita Colindres ([phone]), regarding dc plan. Patient lives at [facility]. Daughter is aware patient will need a snf stay and prefers [facility] skilled unit. Sw answered daughter's questions about medicare skilled benefit/criteria for skilled stay. Jay made referral to [facility] and left Cecelia at the facility a message. Electronically signed by Everton Rolon on 5/21/2019 at 10:59 AM    2:44 PM  Jay received call back from Cecelia at [facility] will have skilled bed for patient. Patient sleeping. Daughter present in room informed.     Electronically signed by Everton Rolon on 5/21/2019 at 2:45 PM

## 2019-05-21 NOTE — PROGRESS NOTES
(NORCO) 5-325 MG per tablet 1 tablet, 1 tablet, Oral, Q6H PRN  sodium chloride flush 0.9 % injection 10 mL, 10 mL, Intravenous, 2 times per day  sodium chloride flush 0.9 % injection 10 mL, 10 mL, Intravenous, PRN  magnesium hydroxide (MILK OF MAGNESIA) 400 MG/5ML suspension 30 mL, 30 mL, Oral, Daily PRN  ondansetron (ZOFRAN) injection 4 mg, 4 mg, Intravenous, Q6H PRN  enoxaparin (LOVENOX) injection 40 mg, 40 mg, Subcutaneous, Daily  docusate sodium (COLACE) capsule 100 mg, 100 mg, Oral, BID  amiodarone (CORDARONE) tablet 100 mg, 100 mg, Oral, Daily  aspirin chewable tablet 81 mg, 81 mg, Oral, Daily  vitamin D (CHOLECALCIFEROL) tablet 2,000 Units, 2,000 Units, Oral, Daily  folic acid (FOLVITE) tablet 1 mg, 1 mg, Oral, Daily  levothyroxine (SYNTHROID) tablet 112 mcg, 112 mcg, Oral, Daily  pantoprazole (PROTONIX) tablet 40 mg, 40 mg, Oral, QAM AC  sennosides-docusate sodium (SENOKOT-S) 8.6-50 MG tablet 1 tablet, 1 tablet, Oral, Daily    ASSESSMENT AND PLAN    Fall  Right shoulder pain, noted right clavicular fx distally, nonoperative in sling prn pain for comfort, No right shoulder abduction  Left hip pain. Left femoral neck fx, displaced. Plan hemiarthroplasty yesterday per DR Mckeon Come  Right knee laceration. Wash out and close per ER. Plan right knee dressing change tomorrow at bedside. PT OT following  ECF planned. Lovenox as inpt then switch to ASA as ordered. Postop confusion, most likely related to anesthesia and meds.  Will order Tylenol ATC for pain and use narcotic sparingly          Ann-Marie Coronado [facility]  5/21/2019  10:56 AM

## 2019-05-21 NOTE — DISCHARGE INSTR - COC
[facility]) Continuity of Care Form    Patient Name:  El Rogers  : 1922    MRN:  [id number]    Admit date:  2019  Discharge date:  19    Code Status Order: Full Code  Advance Directives: Yes    Admitting Physician: Vincent Betancur MD  PCP: Eva Bundy DO    Discharging Nurse: [facility] Unit/Room#: T8C-9650/3129-01  Discharging Unit Phone Number: [phone]    Emergency Contact:        Past Surgical History:  Past Surgical History:   Procedure Laterality Date    CARDIOVERSION  2017    CATARACT REMOVAL      bilateral removal    ENDOSCOPY, COLON, DIAGNOSTIC  16    Esophagogastroduodenoscopy with biopsy    INGUINAL HERNIA REPAIR      JOINT REPLACEMENT      left  knee     MALIGNANT SKIN LESION EXCISION      nose and forehead    PERCUTANEOUS BALLOON VALVULOPLASTY  2016    BAV       Immunization History:   Immunization History   Administered Date(s) Administered    Influenza Vaccine, unspecified formulation 10/03/2015    Influenza Virus Vaccine 2016, 10/02/2017    Pneumococcal 13-valent Conjugate ([id number]) 2017    Pneumococcal Conjugate 7-valent 10/29/2008    Pneumococcal Polysaccharide ([id number]) 2015    Tdap (Boostrix, Adacel) 2019       Active Problems:  Active Problems:    Closed displaced fracture of left femoral neck (HCC)  Resolved Problems:    * No resolved hospital problems.  *      Isolation/Infection:       Nurse Assessment:  Last Vital Signs:BP (!) 109/52   Pulse 66   Temp 97.8 °F (36.6 °C) (Oral)   Resp 16   Ht 5' 5\" (1.651 m)   Wt 138 lb 7.2 oz (62.8 kg)   SpO2 99%   BMI 23.04 kg/m²   Last documented pain score (0-10 scale): Pain Level: 4  Last Weight:   Wt Readings from Last 1 Encounters:   19 138 lb 7.2 oz (62.8 kg)     Mental Status:  disoriented and alert     IV Access:  - None    Nursing Mobility/ADLs:  Walking   Dependent  Transfer  Dependent  Bathing Dependent  Dressing  Dependent  Toileting  Dependent  Feeding  Assisted  Med Admin  {[facility] VDTO:[id number]}  Med Delivery   whole    Wound Care Documentation and Therapy:  Keep tan Mepilex dressing clean and intact right knee and left hip for 7 days after surgery then removeReplace right knee Mepilex for 7 more days but leave left hip wound to air if no drainage noted. Mepilex is waterproof for showering. Wound Laceration Head Posterior;Right passed out at home, laceration stapled in ED (Active)   Number of days:        Wound 05/20/19 Knee Anterior;Right (Active)   Dressing Status New drainage 5/20/2019  8:50 PM   Dressing/Treatment Dry Dressing;Steri-strips 5/20/2019  8:50 PM   Drainage Amount Moderate 5/20/2019  8:50 PM   Drainage Description Sanguinous 5/20/2019  8:50 PM   Odor None 5/20/2019  6:51 PM   Number of days: 0        Elimination:  Urinary Catheter: None   Colostomy/Ileostomy: No  Continence:   · Bowel: No  · Bladder: No  Date of Last BM: pt unsure    Intake/Output Summary (Last 24 hours)     Intake/Output Summary (Last 24 hours) at 5/21/2019 0844  Last data filed at 5/21/2019 0545  Gross per 24 hour   Intake 1315 ml   Output 875 ml   Net 440 ml     Safety Concerns:     History of Falls (last 30 days)    Impairments/Disabilities:      None    Nutrition Therapy:  Current Nutrition Therapy: DIET GENERAL;  Dietary Nutrition Supplements: Standard High Calorie Oral Supplement  Routes of Feeding: Oral  Liquids: Thin Liquids  Daily Fluid Restriction: no  Last Modified Barium Swallow with Video (Video Swallowing Test): not done    Treatments at the Time of Hospital Discharge:   Respiratory Treatments: IS  Oxygen Therapy:  is on oxygen at 1 L/min per nasal cannula.   Ventilator:    - No ventilator support    Lab orders for discharge:        Rehab Therapies: Physical Therapy, Occupational Therapy and nursing care  Weight Bearing Status/Restrictions: No weight bearing restirctions, Posterior left hip precautions. Keep right knee in immobilizer, remove daily to inspect skin  Other Medical Equipment (for information only, NOT a DME order): rolling walker   Other Treatments:     Patient's personal belongings (please select all that are sent with patient):  Glasses, Hearing Aides bilateral    RN SIGNATURE:  Electronically signed by Miya Kirkpatrick RN on 5/25/19 at 10:49 AM    PHYSICIAN SECTION    Prognosis: Guarded    Condition at Discharge: Stable    Rehab Potential (if transferring to Rehab): Guarded    Physician Certification: I certify the above orders, information, and transfer of Caity Alfonso is necessary for the continuing treatment of the diagnosis listed and that he requires [place] for less 30 days. ______________________________________________  - CBC Tuesday 5/28/2019  - PTOT  - Skin Care protocol  ______________________________________________        Update Admission H&P: No change in H&P    PHYSICIAN SIGNATURE:  Electronically signed by CAMRON Rehman CNP on 5/23/19 at 8:45 AM/ Dr Lizzeth Maravilla 5/25/2019 8:10 AM     CASE MANAGEMENT/SOCIAL WORK SECTION    Inpatient Status Date: 5/20/2019    [place] Readmission Risk Assessment Score:    Discharging to Facility/ Agency   · Name: Henrique Martinez  · Address:  · [phone]  · Fax:      / signature:Electronically signed by Miri Lugo on 5/22/2019 at 5:15 PM           Followup Dr Lizzeth Pedersen in 2 weeks   [address] [facility], [place], [address] [address] [phone], [phone]      Posterior Approach   The main positions and movements to avoid after a posterior approach include crossing your legs, turning your hip and leg inward, or bending the hip more than 90 degrees. Don't cross your legs. When sitting, do not cross your affected leg.  When lying on your back, don't roll your affected leg toward the other leg as you might do when rolling over. A pillow or triangular-shaped wedge may be used to block the legs from crossing. Don't roll your leg and foot in. Use a pillow between your legs when lying in bed to keep your leg from rolling inward. Don't allow the knee of your operated leg to cross the midline of your body. This means don't let your knee move across your body past your navel (belly button). When lying in bed, place pillows between your legs to keep your hip in the correct position. Don't turn your upper body toward your sore hip. When sitting, swivel your whole body rather than turning your upper body toward your hip. Don't twist your body toward your operated hip. This means don't stand pigeon-toed. Keep the toes of your affected leg pointed forward when you stand, sit, or walk. If you turn your body in the direction of your surgical hip without pivoting your foot, your hip will be placed in an unsafe position. Remember to lift and turn your foot as you turn in the same direction as your surgical hip. Don't bend the hip past ninety degrees. This means do not lean too far forward when sitting up in bed. Also, raising your knee up in bed can cause the hip angle to go past ninety degrees. To avoid bending past ninety degrees when sitting in a chair, lean back slightly. Don't bend over past ninety degrees at the waist. Your hip may go past ninety degrees if you bend over at the waist to tie your shoes or  items off the floor. Instead, use a reacher to put on your shoes and socks or to  items from the floor.

## 2019-05-21 NOTE — PROGRESS NOTES
cognition and disoriented at this time and difficulty providing PLOF. PTA pt living at AL at [place], anticipate assistance for ADL tasks and functional mobility. Currently, pt limited by pain. Pt presents with limited ROM/strength in UEs. Pt completed bed mobility with max A x3. Pt tolerated sitting EOB for ~5 minutes with mod Ax2 - balance and support of RLE. Anticipate pt to require max A for ADL needs. Pt is unsafe to return to AL at this time and would benefit from continued low frequency skilled therapy to increase mobility and independence. Treatment Diagnosis: impaired func mob, transfers, ADL status, and cognition  Prognosis: Fair  Decision Making: Medium Complexity  History: PMH: Melanoma, Hypothyroidism, Macular degeneration  Exam: ADLs, bed mob  Assistance / Modification: Max x3 for bed mobility, max A for ADLs  Patient Education: Role of OT, POC, safety   Barriers to Learning: Cognition  REQUIRES OT FOLLOW UP: Yes  Activity Tolerance  Activity Tolerance: Patient limited by pain;Treatment limited secondary to decreased cognition  Safety Devices  Safety Devices in place: Yes(RN [facility]) notified)  Type of devices: Left in bed;Bed alarm in place;Call light within reach;Nurse notified           Patient Diagnosis(es): The primary encounter diagnosis was Closed displaced fracture of left femoral neck ([address] Diagnoses of Closed nondisplaced fracture of right clavicle, unspecified part of clavicle, initial encounter, Closed head injury, initial encounter, Right knee injury, initial encounter, Laceration of left lower extremity, initial encounter, Cervical strain, acute, initial encounter, and Fall, initial encounter were also pertinent to this visit. has a past medical history of Anemia, pernicious, Arthritis, Cataract, High blood pressure, Hyperlipidemia, Hypothyroidism, Macular degeneration, Melanoma ([address] Nocturia, Urinary incontinence, and Vertigo.    has a past surgical history that includes joint replacement (2008); Inguinal hernia repair (2007); Cataract removal (2010); malignant skin lesion excision (2010); Endoscopy, colon, diagnostic (5/23/16); percutaneous balloon valvuloplasty (5/20/2016); and Cardioversion (01/2017). Treatment Diagnosis: impaired func mob, transfers, ADL status, and cognition      Restrictions  Restrictions/Precautions  Restrictions/Precautions: Fall Risk  Position Activity Restriction  Other position/activity restrictions: RUE in sling no abduction. RLE in knee immobilizer d/t laceration. LLE WBAT anterolateral approach. Subjective   General  Chart Reviewed: Yes  Patient assessed for rehabilitation services?: Yes  Additional Pertinent Hx: Pt is 80 y.o. F who presents s/p several mechanical fall at assisted living resulting in R clavicle fracture (now in sling with no abduction), R knee in knee immobilizer d/t laceration and contusion, L hip fracture now s/p hemiarthroplasty and is WBAT. PMH: Melanoma, Hypothyroidism, Macular degeneration  Family / Caregiver Present: No  Referring Practitioner: Anatoly Leyva MD   Diagnosis: Closed displaced L femoral neck fracture  Subjective  Subjective: Pt met bedside, resting supine in bed. Pt is yelling out for Tree Davis (which is her granddaughter). Pt reports being very disoriented.    Pain Assessment  Pain Assessment: 0-10  Pain Level: 4  Patient's Stated Pain Goal: 4  Pain Type: Surgical pain  Pain Location: Hip  Pain Orientation: Left  Pain Descriptors: Aching  Pain Frequency: Continuous  Pain Onset: On-going  Clinical Progression: Gradually improving  Functional Pain Assessment: Prevents or interferes with many active not passive activities  Non-Pharmaceutical Pain Intervention(s): Cold applied  Response to Pain Intervention: Patient Satisfied  Multiple Pain Sites: No  Oxygen Therapy  SpO2: 99 %  O2 Device: Nasal cannula  O2 Flow Rate (L/min): 3 L/min     Social/Functional History  Social/Functional History  Lives With: Alone  Type of Home: Assisted living(Believed to be AL not skilled unit )  Home Layout: One level  Home Access: Level entry  Bathroom Shower/Tub: Walk-in shower  Bathroom Toilet: Handicap height  ADL Assistance: (Reports assistance at times )  Homemaking Assistance: (Anticipate assist)  Ambulation Assistance: (Reports she has been ambulating in the last month, unable to describe use of device)  Additional Comments: Pt is disoriented and having difficulty answering PLOF info at this time. Would benefit from clarification of level of assistance provided. Objective   Vision: Impaired  Vision Exceptions: Wears glasses at all times(Macular Degeneration)  Hearing: Exceptions to [facility]  Hearing Exceptions: Bilateral hearing aid;Hard of hearing/hearing concerns      Orientation  Overall Orientation Status: Impaired  Orientation Level: Disoriented to person;Oriented to situation;Oriented to place; Disoriented to time     Balance  Sitting Balance: Moderate assistance(Mod A for sitting balance and mod A to support RLE, tolerated sitting upright for ~5 minutes )    Functional Mobility  Functional Mobility Comments: Unsafe to attempt functional mobility at this time - d/t pain pt unable to attempt OOB activity at this time. ADL  Feeding: Setup  Additional Comments: Unsure of level of assistance PTA - anticipate ranging from setup to min A. Anticipate at this time pt to require max A for bathing/dressing/toileting. Tone RUE  RUE Tone: Normotonic  Tone LUE  LUE Tone: Normotonic  Quality of Movement Other  Comment: RUE in sling - no abduction. Pt reports difficulty with fine motor tasks d/t arthritis.       Bed mobility  Supine to Sit: Dependent/Total(Assist of 3 )  Sit to Supine: Dependent/Total(Assist of 3)  Scooting: Dependent/Total     Transfers  Transfer Comments: Unable to attempt OOB activity d/t pain - anticipate maxi move or stand pivot (difficulty with stedy d/t knee immobilizer)     Cognition  Overall Cognitive Status: Exceptions  Arousal/Alertness: Delayed responses to stimuli  Following Commands: Follows one step commands with increased time; Follows one step commands with repetition  Memory: Decreased recall of recent events;Decreased short term memory  Safety Judgement: Decreased awareness of need for assistance;Decreased awareness of need for safety  Problem Solving: Assistance required to generate solutions;Assistance required to implement solutions  Insights: Decreased awareness of deficits  Initiation: Requires cues for some  Sequencing: Requires cues for some        Sensation  Overall Sensation Status: WFL        LUE AROM (degrees)  LUE AROM : WFL  LUE General AROM: Limitations in shoulder flexion  Left Hand AROM (degrees)  Left Hand AROM: WFL  RUE AROM (degrees)  RUE General AROM: Did not assess - placed RUE in sling   Right Hand AROM (degrees)  Right Hand General AROM: Limited by arthritis - AROM however arthritic deformities noted     LUE Strength  LUE Strength Comment: Did not formally assess  RUE Strength  RUE Strength Comment: Did not formally assess          Plan   Plan  Times per week: 3-5  Times per day: Daily  Current Treatment Recommendations: Strengthening, Endurance Training, Balance Training, Safety Education & Training, Equipment Evaluation, Education, & procurement, Patient/Caregiver Education & Training, Self-Care / ADL    AM-[facility] Score    Melany Soni scored a 11/24 on the AM-[facility] ADL Inpatient form. Current research shows that an AM-PAC score of 17 or less is typically not associated with a discharge to the patient's home setting. Based on the patients AM-PAC score and their current ADL deficits, it is recommended that the patient have 3-5 sessions per week of Occupational Therapy at d/c to increase the patients independence.        AM-PAC Inpatient Daily Activity Raw Score: 11  AM-PAC Inpatient ADL T-Scale Score : 29.04  ADL Inpatient CMS 0-100% Score: 70.42  ADL Inpatient CMS G-Code Modifier : CL    Goals  Short term goals  Time Frame for Short term goals: Prior to d/c  Short term goal 1: Pt will tolerate sitting EOB for 5+ minutes with CGA  Short term goal 2: Pt will complete grooming tasks with setup  Short term goal 3: Pt will complete transfer with mod Ax2  Patient Goals   Patient goals : \"I never thought I would be in this shape\" Unable to formulate goal at this time       Therapy Time   Individual Concurrent Group Co-treatment   Time In       0815   Time Out       0855   Minutes       40   Timed Code Treatment Minutes: 25 Minutes(15 min eval )     If pt is discharged prior to next OT session, this note will serve as the discharge summary.     Faye Monsalve

## 2019-05-22 LAB
ALBUMIN SERPL-MCNC: 3 G/DL (ref 3.4–5)
ANION GAP SERPL CALCULATED.3IONS-SCNC: 14 MMOL/L (ref 3–16)
BASOPHILS ABSOLUTE: 0 K/UL (ref 0–0.2)
BASOPHILS RELATIVE PERCENT: 0.3 %
BUN BLDV-MCNC: 34 MG/DL (ref 7–20)
CALCIUM SERPL-MCNC: 8.2 MG/DL (ref 8.3–10.6)
CHLORIDE BLD-SCNC: 95 MMOL/L (ref 99–110)
CO2: 21 MMOL/L (ref 21–32)
CREAT SERPL-MCNC: 1.5 MG/DL (ref 0.6–1.2)
EOSINOPHILS ABSOLUTE: 0.3 K/UL (ref 0–0.6)
EOSINOPHILS RELATIVE PERCENT: 2.8 %
GFR AFRICAN AMERICAN: 39
GFR NON-AFRICAN AMERICAN: 32
GLUCOSE BLD-MCNC: 145 MG/DL (ref 70–99)
HCT VFR BLD CALC: 21.2 % (ref 36–48)
HEMOGLOBIN: 7.1 G/DL (ref 12–16)
LYMPHOCYTES ABSOLUTE: 1.1 K/UL (ref 1–5.1)
LYMPHOCYTES RELATIVE PERCENT: 10.9 %
MCH RBC QN AUTO: 32.5 PG (ref 26–34)
MCHC RBC AUTO-ENTMCNC: 33.6 G/DL (ref 31–36)
MCV RBC AUTO: 96.9 FL (ref 80–100)
MONOCYTES ABSOLUTE: 0.5 K/UL (ref 0–1.3)
MONOCYTES RELATIVE PERCENT: 5.2 %
NEUTROPHILS ABSOLUTE: 8.3 K/UL (ref 1.7–7.7)
NEUTROPHILS RELATIVE PERCENT: 80.8 %
ORGANISM: ABNORMAL
ORGANISM: ABNORMAL
PDW BLD-RTO: 17.4 % (ref 12.4–15.4)
PHOSPHORUS: 3.5 MG/DL (ref 2.5–4.9)
PLATELET # BLD: 119 K/UL (ref 135–450)
PMV BLD AUTO: 7.7 FL (ref 5–10.5)
POTASSIUM SERPL-SCNC: 3.8 MMOL/L (ref 3.5–5.1)
RBC # BLD: 2.19 M/UL (ref 4–5.2)
SODIUM BLD-SCNC: 130 MMOL/L (ref 136–145)
URINE CULTURE, ROUTINE: ABNORMAL
WBC # BLD: 10.2 K/UL (ref 4–11)

## 2019-05-22 PROCEDURE — 85025 COMPLETE CBC W/AUTO DIFF WBC: CPT

## 2019-05-22 PROCEDURE — 97530 THERAPEUTIC ACTIVITIES: CPT

## 2019-05-22 PROCEDURE — 6370000000 HC RX 637 (ALT 250 FOR IP): Performed by: INTERNAL MEDICINE

## 2019-05-22 PROCEDURE — 6370000000 HC RX 637 (ALT 250 FOR IP): Performed by: ORTHOPAEDIC SURGERY

## 2019-05-22 PROCEDURE — 6370000000 HC RX 637 (ALT 250 FOR IP): Performed by: NURSE PRACTITIONER

## 2019-05-22 PROCEDURE — 1200000000 HC SEMI PRIVATE

## 2019-05-22 PROCEDURE — 6360000002 HC RX W HCPCS: Performed by: INTERNAL MEDICINE

## 2019-05-22 PROCEDURE — 2580000003 HC RX 258: Performed by: INTERNAL MEDICINE

## 2019-05-22 PROCEDURE — 80069 RENAL FUNCTION PANEL: CPT

## 2019-05-22 PROCEDURE — 94760 N-INVAS EAR/PLS OXIMETRY 1: CPT

## 2019-05-22 PROCEDURE — 36415 COLL VENOUS BLD VENIPUNCTURE: CPT

## 2019-05-22 RX ORDER — SODIUM CHLORIDE 9 MG/ML
INJECTION, SOLUTION INTRAVENOUS CONTINUOUS
Status: DISCONTINUED | OUTPATIENT
Start: 2019-05-22 | End: 2019-05-23

## 2019-05-22 RX ORDER — DIAZEPAM 2 MG/1
2 TABLET ORAL NIGHTLY PRN
Qty: 5 TABLET | Refills: 2 | Status: SHIPPED | OUTPATIENT
Start: 2019-05-22 | End: 2019-05-27

## 2019-05-22 RX ADMIN — PANTOPRAZOLE SODIUM 40 MG: 40 TABLET, DELAYED RELEASE ORAL at 05:20

## 2019-05-22 RX ADMIN — DOCUSATE SODIUM 100 MG: 100 CAPSULE, LIQUID FILLED ORAL at 21:03

## 2019-05-22 RX ADMIN — DOCUSATE SODIUM 100 MG: 100 CAPSULE, LIQUID FILLED ORAL at 09:32

## 2019-05-22 RX ADMIN — ACETAMINOPHEN 650 MG: 325 TABLET ORAL at 05:20

## 2019-05-22 RX ADMIN — SODIUM CHLORIDE: 9 INJECTION, SOLUTION INTRAVENOUS at 17:52

## 2019-05-22 RX ADMIN — FOLIC ACID 1 MG: 1 TABLET ORAL at 09:32

## 2019-05-22 RX ADMIN — Medication 10 ML: at 09:33

## 2019-05-22 RX ADMIN — ACETAMINOPHEN 650 MG: 325 TABLET ORAL at 13:53

## 2019-05-22 RX ADMIN — ENOXAPARIN SODIUM 40 MG: 40 INJECTION SUBCUTANEOUS at 09:32

## 2019-05-22 RX ADMIN — AMIODARONE HYDROCHLORIDE 100 MG: 200 TABLET ORAL at 09:32

## 2019-05-22 RX ADMIN — LEVOTHYROXINE SODIUM 112 MCG: 112 TABLET ORAL at 05:20

## 2019-05-22 RX ADMIN — ACETAMINOPHEN 650 MG: 325 TABLET ORAL at 21:03

## 2019-05-22 RX ADMIN — VITAMIN D, TAB 1000IU (100/BT) 2000 UNITS: 25 TAB at 09:32

## 2019-05-22 RX ADMIN — ASPIRIN 81 MG 81 MG: 81 TABLET ORAL at 09:32

## 2019-05-22 RX ADMIN — SENNOSIDES, DOCUSATE SODIUM 1 TABLET: 50; 8.6 TABLET, FILM COATED ORAL at 09:32

## 2019-05-22 ASSESSMENT — PAIN DESCRIPTION - PAIN TYPE
TYPE: ACUTE PAIN
TYPE_2: ACUTE PAIN
TYPE_2: ACUTE PAIN
TYPE: ACUTE PAIN
TYPE: ACUTE PAIN

## 2019-05-22 ASSESSMENT — PAIN SCALES - GENERAL
PAINLEVEL_OUTOF10: 6
PAINLEVEL_OUTOF10: 4
PAINLEVEL_OUTOF10: 0
PAINLEVEL_OUTOF10: 4
PAINLEVEL_OUTOF10: 5
PAINLEVEL_OUTOF10: 4

## 2019-05-22 ASSESSMENT — PAIN DESCRIPTION - ORIENTATION
ORIENTATION: LEFT
ORIENTATION_2: RIGHT
ORIENTATION_2: RIGHT
ORIENTATION: LEFT

## 2019-05-22 ASSESSMENT — PAIN DESCRIPTION - PROGRESSION
CLINICAL_PROGRESSION: GRADUALLY IMPROVING
CLINICAL_PROGRESSION_2: GRADUALLY WORSENING
CLINICAL_PROGRESSION: GRADUALLY WORSENING
CLINICAL_PROGRESSION: GRADUALLY IMPROVING
CLINICAL_PROGRESSION: GRADUALLY WORSENING
CLINICAL_PROGRESSION: GRADUALLY IMPROVING
CLINICAL_PROGRESSION_2: GRADUALLY IMPROVING

## 2019-05-22 ASSESSMENT — PAIN DESCRIPTION - LOCATION
LOCATION: KNEE
LOCATION: HIP;KNEE
LOCATION: KNEE;HIP
LOCATION: KNEE
LOCATION_2: KNEE
LOCATION: KNEE
LOCATION_2: KNEE

## 2019-05-22 ASSESSMENT — PAIN DESCRIPTION - DESCRIPTORS
DESCRIPTORS_2: ACHING
DESCRIPTORS_2: ACHING
DESCRIPTORS: ACHING

## 2019-05-22 ASSESSMENT — PAIN DESCRIPTION - INTENSITY
RATING_2: 6
RATING_2: 4

## 2019-05-22 ASSESSMENT — PAIN DESCRIPTION - FREQUENCY
FREQUENCY: CONTINUOUS

## 2019-05-22 ASSESSMENT — PAIN DESCRIPTION - ONSET
ONSET: PROGRESSIVE
ONSET_2: ON-GOING
ONSET: PROGRESSIVE
ONSET: ON-GOING
ONSET: GRADUAL
ONSET_2: ON-GOING

## 2019-05-22 ASSESSMENT — PAIN DESCRIPTION - DURATION
DURATION_2: CONTINUOUS
DURATION_2: CONTINUOUS

## 2019-05-22 ASSESSMENT — PAIN - FUNCTIONAL ASSESSMENT
PAIN_FUNCTIONAL_ASSESSMENT: PREVENTS OR INTERFERES SOME ACTIVE ACTIVITIES AND ADLS

## 2019-05-22 NOTE — PROGRESS NOTES
Hospitalist Progress Note      PCP: Dee Paz DO    Date of Admission: 5/20/2019    Chief Complaint: fell at assisted living. Admitted with left hip and right clavicular factures. Subjective:     Pain mostly in R shoulder this am - worse with cough. Medications:  Reviewed    Infusion Medications   Scheduled Medications    lactobacillus  1 capsule Oral Daily with breakfast    acetaminophen  650 mg Oral 3 times per day    sodium chloride flush  10 mL Intravenous 2 times per day    enoxaparin  40 mg Subcutaneous Daily    docusate sodium  100 mg Oral BID    amiodarone  100 mg Oral Daily    aspirin  81 mg Oral Daily    vitamin D  2,000 Units Oral Daily    folic acid  1 mg Oral Daily    levothyroxine  112 mcg Oral Daily    pantoprazole  40 mg Oral QAM AC    sennosides-docusate sodium  1 tablet Oral Daily     PRN Meds: morphine, HYDROcodone 5 mg - acetaminophen, sodium chloride flush, magnesium hydroxide, ondansetron      Intake/Output Summary (Last 24 hours) at 5/22/2019 1725  Last data filed at 5/22/2019 1516  Gross per 24 hour   Intake 710 ml   Output --   Net 710 ml       Physical Exam Performed:    /63   Pulse 74   Temp 98.2 °F (36.8 °C) (Oral)   Resp 16   Ht 5' 5\" (1.651 m)   Wt 140 lb 10.5 oz (63.8 kg)   SpO2 92%   BMI 23.41 kg/m²        General appearance: No apparent distress appears stated age and cooperative. HEENT Normal cephalic, atraumatic without obvious deformity. Pupils equal, round, and reactive to light. Extra ocular muscles intact. Conjunctivae/corneas clear. Neck: Supple, No jugular venous distention/bruits. Trachea midline without thyromegaly or adenopathy with full range of motion. Lungs: Clear to auscultation, bilaterally without Rales/Wheezes/Rhonchi with good respiratory effort.   Heart: Regular rate and rhythm with Normal S1/S2 without murmurs, rubs or gallops, point of maximum impulse non-displaced  Abdomen: Soft, non-tender or non-distended without rigidity or guarding and positive bowel sounds all four quadrants. Extremities: right shoulder and clavicular pain to palpation. Left leg short and externally rotated. Skin: Skin color, texture, turgor normal.  No rashes or lesions. Neurologic: Alert and oriented X 3, neurovascularly intact with sensory/motor intact upper extremities/lower extremities, bilaterally. Cranial nerves: II-XII intact, grossly non-focal.  Mental status: Alert, oriented, thought content appropriate. Capillary Refill: Acceptable  < 3 seconds  Peripheral Pulses: +3 Easily felt, not easily obliterated with pressure           Labs:   Recent Labs     05/20/19  0914 05/21/19 0617 05/22/19  1003   WBC 12.4* 11.5* 10.2   HGB 11.8* 8.2* 7.1*   HCT 35.4* 24.4* 21.2*    128* 119*     Recent Labs     05/20/19  0914 05/21/19  0617 05/22/19  1003   * 135* 130*   K 4.3 4.2 3.8   CL 99 102 95*   CO2 24 23 21   BUN 21* 21* 34*   CREATININE 0.9 0.8 1.5*   CALCIUM 8.8 8.2* 8.2*   PHOS  --  4.4 3.5     Recent Labs     05/20/19  0914   AST 24   ALT 16   BILITOT 0.7   ALKPHOS 97     No results for input(s): INR in the last 72 hours. Recent Labs     05/20/19  0914   CKTOTAL 179   TROPONINI <0.01       Urinalysis:      Lab Results   Component Value Date    NITRU Negative 05/20/2019    WBCUA 1 05/20/2019    RBCUA 2 05/20/2019    BLOODU Negative 05/20/2019    SPECGRAV 1.014 05/20/2019    GLUCOSEU Negative 05/20/2019       Radiology:  XR HIP 2-3 VW W PELVIS LEFT   Final Result   Postoperative changes related to interval placement of left hip arthroplasty   hardware. No significant periprosthetic lucency or acute osseous abnormality   identified. CT Head WO Contrast   Final Result   No acute intracranial abnormality. CT Cervical Spine WO Contrast   Final Result   No acute abnormality of the cervical spine. XR SHOULDER RIGHT (MIN 2 VIEWS)   Final Result   Right distal clavicular fracture.   This may be subacute to chronic. Degenerative changes in the glenohumeral joint. No evidence of glenohumeral   dislocation. XR CHEST PORTABLE   Final Result   No acute process. Unchanged mild pulmonary vascular congestion. XR FEMUR LEFT (MIN 2 VIEWS)   Final Result   Left femur and hip: Acute displaced fracture of the left femoral neck. XR HIP LEFT (2-3 VIEWS)   Final Result   Left femur and hip: Acute displaced fracture of the left femoral neck. XR KNEE RIGHT (1-2 VIEWS)   Final Result   Soft tissue swelling and joint effusion without evidence of fracture. Advanced tricompartmental osteoarthritis. Assessment/Plan:    Active Hospital Problems    Diagnosis    Closed displaced fracture of left femoral neck ([facility] [address] [S72.002A]       Left Femoral Fracture. S/p Left femoral arthroplasty 5/20  Pain control  PTOT  Will need ECF stay considering concurrent right shoulder fracture.         R Clavicular Fracture - sling immobilizer. Ortho eval.   Pain control.         Hypothyroidism - on synthroid.         Afib Hx of Paroxysmal - on amiodarone. Used to be on coumadin - this was stopped per her primary cardiology Dr Madelin Taylor due to recurrent falls. On ASA.         PreOp -   EKG stable. High risk patient with Hx of Afib and advanced age. No angina. Proceed with surgery   Discussed with pt and family at length. Code status to [facility]. Tolerated surgery well.        JOHN not POA - possible prerenal and due to hemodynamic shifts. Start low dose IVF and monitor daily. Anemia of acute blood loss - secondary to fracture and as expected post op. Hgb down to 7.1 today.   Daily CBC       DVT Prophylaxis: lovenox  Diet: Diet General  Code Status: Full Code     Dispo - inpatient.          Delphia Apley, MD

## 2019-05-22 NOTE — PROGRESS NOTES
Occupational Therapy  Facility/Department: [address] ORTHOPEDICS  Daily Treatment Note  NAME: Anupam Parada  : 1922  MRN: [id number]    Date of Service: 2019    Discharge Recommendations:  3-5 sessions per week     Anupam Parada scored a  on the AM-PAC ADL Inpatient form. Current research shows that an AM-PAC score of 17 or less is typically not associated with a discharge to the patient's home setting. Based on the patients AM-PAC score and their current ADL deficits, it is recommended that the patient have 3-5 sessions per week of Occupational Therapy at d/c to increase the patients independence. Assessment   Performance deficits / Impairments: Decreased functional mobility ; Decreased strength;Decreased endurance;Decreased ADL status; Decreased balance;Decreased cognition  Assessment: Discussed with OTR: Pt alert for session today, and sat at EOB with Mod/Max A x2 for sitting balance (x13 min). Pt requied up to Max(dep)x3 assist for bed mob. Pt limited by decreaed activity tolerance, ROM, pain and decreased cogntion-pt anxious, fearful of falling and c/o pain increasing with movement. Pt will benefit from cont OT to maximize function. Cont poc. Treatment Diagnosis: impaired func mob, transfers, ADL status, and cognition  Prognosis: Fair  Patient Education: Role of OT, bed mob; sitting balance, safety   REQUIRES OT FOLLOW UP: Yes  Activity Tolerance  Activity Tolerance: Patient limited by pain;Treatment limited secondary to decreased cognition  Safety Devices  Safety Devices in place: Yes(RNHeather)  Type of devices: Left in bed;Bed alarm in place;Call light within reach;Nurse notified         Patient Diagnosis(es): The primary encounter diagnosis was Closed displaced fracture of left femoral neck ([address]  Diagnoses of Closed nondisplaced fracture of right clavicle, unspecified part of clavicle, initial encounter, Closed head injury, initial encounter, Right knee injury, initial sitting at EOB x13 min.)  Bed mobility  Supine to Sit: Dependent/Total(Max A x3. pt anxious, fearful and c/o pain with movement)  Sit to Supine: Dependent/Total(Max A x3 -pt anxious, fearful and c/o pain with movement)         Cognition  Overall Cognitive Status: Exceptions  Following Commands: Follows one step commands with repetition; Follows one step commands with increased time  Memory: Decreased recall of recent events;Decreased short term memory  Safety Judgement: Decreased awareness of need for assistance;Decreased awareness of need for safety  Problem Solving: Assistance required to generate solutions;Assistance required to implement solutions  Insights: Decreased awareness of deficits  Initiation: Requires cues for some  Sequencing: Requires cues for some  Cognition Comment: difficulty following precautions d/t pain and fear-frequently expressing fear of falling          Plan   Plan  Times per week: 3-5  Times per day: Daily  Current Treatment Recommendations: Strengthening, Endurance Training, Balance Training, Safety Education & Training, Equipment Evaluation, Education, & procurement, Patient/Caregiver Education & Training, Self-Care / ADL    AM-PAC Score        AM-PAC Inpatient Daily Activity Raw Score: 11  AM-PAC Inpatient ADL T-Scale Score : 29.04  ADL Inpatient CMS 0-100% Score: 70.42  ADL Inpatient CMS G-Code Modifier : CL    Goals  Short term goals  Time Frame for Short term goals: Status: goals ongoing  Short term goal 1: Pt will tolerate sitting EOB for 5+ minutes with CGA  Short term goal 2: Pt will complete grooming tasks with setup  Short term goal 3: Pt will complete transfer with mod Ax2  Patient Goals   Patient goals : \"I never thought I would be in this shape\" Unable to formulate goal at this time       Therapy Time   Individual Concurrent Group Co-treatment   Time In 1455         Time Out 1525         Minutes 30               Frank[place] VICTOR/L,515  This note to serve as discharge summary if pt d/c'd prior to next session

## 2019-05-22 NOTE — PROGRESS NOTES
Patient in bed, she reports she is confused. She is drinking without difficulty but she is not eating much, just a few bites. She has been sleeping most of the morning. The immobilizer to the right leg is in place, dressing is clean, dry, intact. The right foot is warm with brisk cap refill, the let hip dressing is clean, dry, intact, the left femoral pulse,  popliteal pulse and the left pedal pulses are intact. Her right arm continues in a sling. Call light in reach.

## 2019-05-22 NOTE — ANESTHESIA POST-OP
[facility] Orthopedic Surgery   Progress Note      S/P :  SUBJECTIVE  Alert and pleasant. Confused to situation but recognized visitor. Pain is   described in left hip and right shoulder and with the intensity of moderate. Pain is described as aching. Pain is worse with any activity and better if still       OBJECTIVE              Physical                      VITALS:  BP (!) 109/56   Pulse 64   Temp 97.7 °F (36.5 °C) (Oral)   Resp 16   Ht 5' 5\" (1.651 m)   Wt 140 lb 10.5 oz (63.8 kg)   SpO2 99%   BMI 23.41 kg/m²                     MUSCULOSKELETAL:  left foot NVI. Wiggles toes to command. Pedal pulses are palpable. right hand NVI. Wiggles fingers to command. Radial pulses are palpable. Right shoulder with noted bruising today. As well left hip with bruising today. Right knee in immobilizer with Kerlix roll clean and dry. NEUROLOGIC:                                  Sensory:  Touch:  Right Upper Extremity:  normal  Left Upper Extremity:  normal                                  Left hip dressing clean and dry with Mepilex AG dressing.      Data       CBC:   Lab Results   Component Value Date    WBC 10.2 05/22/2019    RBC 2.19 05/22/2019    HGB 7.1 05/22/2019    HCT 21.2 05/22/2019    MCV 96.9 05/22/2019    MCH 32.5 05/22/2019    MCHC 33.6 05/22/2019    RDW 17.4 05/22/2019     05/22/2019    MPV 7.7 05/22/2019        WBC:    Lab Results   Component Value Date    WBC 10.2 05/22/2019        Hemoglobin/Hematocrit:    Lab Results   Component Value Date    HGB 7.1 05/22/2019    HCT 21.2 05/22/2019        PT/INR:    Lab Results   Component Value Date    PROTIME 29.6 02/02/2019    INR 2.60 02/02/2019              Current Inpatient Medications             Current Facility-Administered Medications: lactobacillus (CULTURELLE) capsule 1 capsule, 1 capsule, Oral, Daily with breakfast  acetaminophen (TYLENOL) tablet 650 mg, 650 mg, Oral, 3 times per day  morphine (PF) injection 2 mg, 2 mg, Intravenous, Q4H PRN  HYDROcodone-acetaminophen (NORCO) 5-325 MG per tablet 1 tablet, 1 tablet, Oral, Q6H PRN  sodium chloride flush 0.9 % injection 10 mL, 10 mL, Intravenous, 2 times per day  sodium chloride flush 0.9 % injection 10 mL, 10 mL, Intravenous, PRN  magnesium hydroxide (MILK OF MAGNESIA) 400 MG/5ML suspension 30 mL, 30 mL, Oral, Daily PRN  ondansetron (ZOFRAN) injection 4 mg, 4 mg, Intravenous, Q6H PRN  enoxaparin (LOVENOX) injection 40 mg, 40 mg, Subcutaneous, Daily  docusate sodium (COLACE) capsule 100 mg, 100 mg, Oral, BID  amiodarone (CORDARONE) tablet 100 mg, 100 mg, Oral, Daily  aspirin chewable tablet 81 mg, 81 mg, Oral, Daily  vitamin D (CHOLECALCIFEROL) tablet 2,000 Units, 2,000 Units, Oral, Daily  folic acid (FOLVITE) tablet 1 mg, 1 mg, Oral, Daily  levothyroxine (SYNTHROID) tablet 112 mcg, 112 mcg, Oral, Daily  pantoprazole (PROTONIX) tablet 40 mg, 40 mg, Oral, QAM AC  sennosides-docusate sodium (SENOKOT-S) 8.6-50 MG tablet 1 tablet, 1 tablet, Oral, Daily    ASSESSMENT AND PLAN    ASSESSMENT AND PLAN    Fall  Right shoulder pain, noted right clavicular fx distally, nonoperative in sling prn pain for comfort, No right shoulder abduction  Left hip pain. Left femoral neck fx, displaced. Plan hemiarthroplasty yesterday per DR Anatoly Leyva  Right knee laceration. Wash out and close per ER. Plan right knee dressing change tomorrow at bedside. PT OT following. NWB right arm . WBAT left and right leg. Keep right knee in immoblizer  ECF planned. Lovenox as inpt then switch to ASA as ordered. Postop confusion, most likely related to anesthesia and meds.  Will order Tylenol ATC for pain and use narcotic sparingly          [facility]  5/22/2019  10:57 AM

## 2019-05-22 NOTE — PROGRESS NOTES
Patient in bed, she is much more alert this shift than yesterday, she is oriented x3 not to time. She reports that she only has pain when she moves. Dressing to the right knee changed per NPs order. Fluids running per MD order. She denies needs at this time. Vitals stable. Call light in reach.

## 2019-05-22 NOTE — PROGRESS NOTES
Physical Therapy    Facility/Department: [address] ORTHOPEDICS  Initial Assessment    NAME: Romel Cummings  : 1922  MRN: [id number]    Date of Service: 2019    Discharge Recommendations:  Romel Cummings scored a 7/24 on the AM-PAC short mobility form. Current research shows that an AM-PAC score of 17 or less is typically not associated with a discharge to the patient's home setting. Based on the patients AM-PAC score and their current functional mobility deficits, it is recommended that the patient have 3-5 sessions per week of Physical Therapy at d/c to increase the patients independence. Assessment  Pt continues to engage with PTOT efforts with sitting EOB. Not yet ready for OOB sitting. Body structures, Functions, Activity limitations: Decreased functional mobility ; Decreased ADL status; Decreased ROM; Decreased strength;Decreased balance  Assessment: Pt progressed to EOB sitting x15 minutes with mod/max A of 2. Prognosis: Fair  Decision Making: Medium Complexity  Activity Tolerance  Activity Tolerance: Patient limited by pain; Patient limited by endurance       Patient Diagnosis(es): The primary encounter diagnosis was Closed displaced fracture of left femoral neck ([address] Diagnoses of Closed nondisplaced fracture of right clavicle, unspecified part of clavicle, initial encounter, Closed head injury, initial encounter, Right knee injury, initial encounter, Laceration of left lower extremity, initial encounter, Cervical strain, acute, initial encounter, Fall, initial encounter, and Vertigo were also pertinent to this visit. has a past medical history of Anemia, pernicious, Arthritis, Cataract, High blood pressure, Hyperlipidemia, Hypothyroidism, Macular degeneration, Melanoma ([address] Nocturia, Urinary incontinence, and Vertigo. has a past surgical history that includes joint replacement (); Inguinal hernia repair ();  Cataract removal (); malignant skin lesion excision (); Endoscopy, colon, diagnostic (5/23/16); percutaneous balloon valvuloplasty (5/20/2016); Cardioversion (01/2017); and HEMIARTHROPLASTY HIP (Left, 5/20/2019). Restrictions  Restrictions/Precautions  Restrictions/Precautions: Fall Risk  Position Activity Restriction  Other position/activity restrictions: RUE in sling no abduction. RLE in knee immobilizer d/t laceration. LLE WBAT anterolateral approach. Vision/Hearing        Subjective  General  Chart Reviewed: Yes  Patient assessed for rehabilitation services?: Yes  Follows Commands: Within Functional Limits  Subjective  Subjective: Arrived in room along with OT. Daughter in room providing encouragement. Agreeable with PTOT intake. Pain Screening  Patient Currently in Pain: Denies    Social/Functional History  Social/Functional History  Lives With: Alone  Type of Home: Assisted living(Believed to be AL not skilled unit )  Home Layout: One level  Home Access: Level entry  Bathroom Shower/Tub: Walk-in shower  Bathroom Toilet: Handicap height  ADL Assistance: (Reports assistance at times )  Homemaking Assistance: (Anticipate assist)  Ambulation Assistance: (Reports she has been ambulating in the last month, unable to describe use of device)  Additional Comments: Pt is disoriented and having difficulty answering PLOF info at this time. Would benefit from clarification of level of assistance provided. Objective  Bed mobility  Supine to Sit: Dependent/Total(Max A of 3 to ease pain)  Sit to Supine: Dependent/Total(Max A of 3 to ease pain)  Scooting: Dependent/Total(Max A of 3 to ease pain)  Transfers  Sit to Stand: Unable to assess  Stand to sit: Unable to assess  Ambulation  Ambulation?: No  Stairs/Curb  Stairs?: No     Balance  Comments: Pt was positioned into midline. Tolerated sitting but required mod/max A of 2.         Plan   Plan  Times per week: QD x1 3-5  Current Treatment Recommendations: Transfer Training, Functional Mobility Training, Strengthening, Modalities, Positioning, Patient/Caregiver Education & Training  Safety Devices  Type of devices:  All fall risk precautions in place, Bed alarm in place, Call light within reach, Left in bed, Nurse notified(Fior Rosenbaum)    AM-PAC Score  AM-PAC Inpatient Mobility Raw Score : 7  AM-PAC Inpatient T-Scale Score : 26.42  Mobility Inpatient CMS 0-100% Score: 92.36  Mobility Inpatient CMS G-Code Modifier : CM    Goals  Short term goals  Time Frame for Short term goals: 1-2 days  Short term goal 1: Bed mobility at max assist  Short term goal 2: Transfers at max assist x2  Short term goal 3: Ambulation goals deferred to next level of care  Short term goal 4: Sit EOB x10 minutes at min assist  Patient Goals   Patient goals : Decrease pain       Therapy Time   Individual Concurrent Group Co-treatment   Time In 1455         Time Out 1525         Minutes [address] [facility],[address] Heriberto Garcia, PT

## 2019-05-22 NOTE — PLAN OF CARE
Problem: Falls - Risk of:  Goal: Will remain free from falls  Description  Will remain free from falls  5/22/2019 0127 by Hans Almonte RN  Outcome: Ongoing  Note:   Fall risk assessment completed . Fall precautions in place, bed/ chair alarm on, side rails 2/4 up, call light in reach, educated pt on calling for assistance when needed, room clear of clutter. Pt verbalized understanding. Problem: Falls - Risk of:  Goal: Absence of physical injury  Description  Absence of physical injury  Outcome: Ongoing     Problem: Risk for Impaired Skin Integrity  Goal: Tissue integrity - skin and mucous membranes  Description  Structural intactness and normal physiological function of skin and  mucous membranes. 5/22/2019 0127 by Hans Almonte RN  Outcome: Ongoing  Note:   Skin assessment completed every shift. Pt assessed for incontinence, appropriate barrier cream applied prn. Pt encouraged to turn/rotate every 2 hours. Assistance provided if pt unable to do so themselves. Problem: Pain:  Goal: Pain level will decrease  Description  Pain level will decrease  5/22/2019 0127 by Hans Almonte RN  Outcome: Ongoing     Problem: Pain:  Goal: Control of acute pain  Description  Control of acute pain  5/22/2019 0127 by Hans Almonte RN  Outcome: Ongoing  Note:   Pain/discomfort being managed with PRN analgesics per MD orders. Pt able to express presence and absence of pain and rate pain appropriately using numerical scale.       Problem: Pain:  Goal: Control of chronic pain  Description  Control of chronic pain  Outcome: Ongoing

## 2019-05-23 LAB
ALBUMIN SERPL-MCNC: 2.6 G/DL (ref 3.4–5)
ANION GAP SERPL CALCULATED.3IONS-SCNC: 10 MMOL/L (ref 3–16)
BASOPHILS ABSOLUTE: 0 K/UL (ref 0–0.2)
BASOPHILS RELATIVE PERCENT: 0.2 %
BLOOD BANK DISPENSE STATUS: NORMAL
BLOOD BANK PRODUCT CODE: NORMAL
BPU ID: NORMAL
BUN BLDV-MCNC: 28 MG/DL (ref 7–20)
CALCIUM SERPL-MCNC: 8.3 MG/DL (ref 8.3–10.6)
CHLORIDE BLD-SCNC: 101 MMOL/L (ref 99–110)
CO2: 21 MMOL/L (ref 21–32)
CREAT SERPL-MCNC: 0.9 MG/DL (ref 0.6–1.2)
DESCRIPTION BLOOD BANK: NORMAL
EOSINOPHILS ABSOLUTE: 0.2 K/UL (ref 0–0.6)
EOSINOPHILS RELATIVE PERCENT: 2.7 %
GFR AFRICAN AMERICAN: >60
GFR NON-AFRICAN AMERICAN: 58
GLUCOSE BLD-MCNC: 103 MG/DL (ref 70–99)
HCT VFR BLD CALC: 19.3 % (ref 36–48)
HEMOGLOBIN: 6.6 G/DL (ref 12–16)
LYMPHOCYTES ABSOLUTE: 0.9 K/UL (ref 1–5.1)
LYMPHOCYTES RELATIVE PERCENT: 10.9 %
MCH RBC QN AUTO: 32.6 PG (ref 26–34)
MCHC RBC AUTO-ENTMCNC: 34.1 G/DL (ref 31–36)
MCV RBC AUTO: 95.5 FL (ref 80–100)
MONOCYTES ABSOLUTE: 0.6 K/UL (ref 0–1.3)
MONOCYTES RELATIVE PERCENT: 7.2 %
NEUTROPHILS ABSOLUTE: 6.4 K/UL (ref 1.7–7.7)
NEUTROPHILS RELATIVE PERCENT: 79 %
PDW BLD-RTO: 17.3 % (ref 12.4–15.4)
PHOSPHORUS: 2.7 MG/DL (ref 2.5–4.9)
PLATELET # BLD: 121 K/UL (ref 135–450)
PMV BLD AUTO: 7.5 FL (ref 5–10.5)
POTASSIUM SERPL-SCNC: 4 MMOL/L (ref 3.5–5.1)
RBC # BLD: 2.02 M/UL (ref 4–5.2)
SODIUM BLD-SCNC: 132 MMOL/L (ref 136–145)
WBC # BLD: 8.1 K/UL (ref 4–11)

## 2019-05-23 PROCEDURE — 36430 TRANSFUSION BLD/BLD COMPNT: CPT

## 2019-05-23 PROCEDURE — 2580000003 HC RX 258: Performed by: HOSPITALIST

## 2019-05-23 PROCEDURE — P9016 RBC LEUKOCYTES REDUCED: HCPCS

## 2019-05-23 PROCEDURE — 86923 COMPATIBILITY TEST ELECTRIC: CPT

## 2019-05-23 PROCEDURE — 85025 COMPLETE CBC W/AUTO DIFF WBC: CPT

## 2019-05-23 PROCEDURE — 2580000003 HC RX 258: Performed by: INTERNAL MEDICINE

## 2019-05-23 PROCEDURE — 6370000000 HC RX 637 (ALT 250 FOR IP): Performed by: ORTHOPAEDIC SURGERY

## 2019-05-23 PROCEDURE — 36415 COLL VENOUS BLD VENIPUNCTURE: CPT

## 2019-05-23 PROCEDURE — 6360000002 HC RX W HCPCS: Performed by: INTERNAL MEDICINE

## 2019-05-23 PROCEDURE — 94760 N-INVAS EAR/PLS OXIMETRY 1: CPT

## 2019-05-23 PROCEDURE — 1200000000 HC SEMI PRIVATE

## 2019-05-23 PROCEDURE — 80069 RENAL FUNCTION PANEL: CPT

## 2019-05-23 PROCEDURE — 6370000000 HC RX 637 (ALT 250 FOR IP): Performed by: NURSE PRACTITIONER

## 2019-05-23 PROCEDURE — 6370000000 HC RX 637 (ALT 250 FOR IP): Performed by: INTERNAL MEDICINE

## 2019-05-23 RX ORDER — 0.9 % SODIUM CHLORIDE 0.9 %
250 INTRAVENOUS SOLUTION INTRAVENOUS ONCE
Status: COMPLETED | OUTPATIENT
Start: 2019-05-23 | End: 2019-05-24

## 2019-05-23 RX ORDER — 0.9 % SODIUM CHLORIDE 0.9 %
250 INTRAVENOUS SOLUTION INTRAVENOUS ONCE
Status: DISCONTINUED | OUTPATIENT
Start: 2019-05-23 | End: 2019-05-25 | Stop reason: HOSPADM

## 2019-05-23 RX ADMIN — DOCUSATE SODIUM 100 MG: 100 CAPSULE, LIQUID FILLED ORAL at 21:57

## 2019-05-23 RX ADMIN — AMIODARONE HYDROCHLORIDE 100 MG: 200 TABLET ORAL at 08:13

## 2019-05-23 RX ADMIN — FOLIC ACID 1 MG: 1 TABLET ORAL at 08:13

## 2019-05-23 RX ADMIN — LEVOTHYROXINE SODIUM 112 MCG: 112 TABLET ORAL at 05:38

## 2019-05-23 RX ADMIN — DOCUSATE SODIUM 100 MG: 100 CAPSULE, LIQUID FILLED ORAL at 08:13

## 2019-05-23 RX ADMIN — ACETAMINOPHEN 650 MG: 325 TABLET ORAL at 15:05

## 2019-05-23 RX ADMIN — Medication 10 ML: at 21:00

## 2019-05-23 RX ADMIN — Medication 10 ML: at 08:14

## 2019-05-23 RX ADMIN — PANTOPRAZOLE SODIUM 40 MG: 40 TABLET, DELAYED RELEASE ORAL at 05:38

## 2019-05-23 RX ADMIN — SODIUM CHLORIDE 250 ML: 9 INJECTION, SOLUTION INTRAVENOUS at 15:01

## 2019-05-23 RX ADMIN — ACETAMINOPHEN 650 MG: 325 TABLET ORAL at 05:38

## 2019-05-23 RX ADMIN — ENOXAPARIN SODIUM 40 MG: 40 INJECTION SUBCUTANEOUS at 08:13

## 2019-05-23 RX ADMIN — ACETAMINOPHEN 650 MG: 325 TABLET ORAL at 21:57

## 2019-05-23 RX ADMIN — SENNOSIDES, DOCUSATE SODIUM 1 TABLET: 50; 8.6 TABLET, FILM COATED ORAL at 08:13

## 2019-05-23 RX ADMIN — ASPIRIN 81 MG 81 MG: 81 TABLET ORAL at 08:13

## 2019-05-23 RX ADMIN — VITAMIN D, TAB 1000IU (100/BT) 2000 UNITS: 25 TAB at 08:13

## 2019-05-23 RX ADMIN — Medication 1 CAPSULE: at 08:13

## 2019-05-23 ASSESSMENT — PAIN DESCRIPTION - LOCATION: LOCATION: HIP

## 2019-05-23 ASSESSMENT — PAIN SCALES - GENERAL
PAINLEVEL_OUTOF10: 1
PAINLEVEL_OUTOF10: 0

## 2019-05-23 ASSESSMENT — PAIN - FUNCTIONAL ASSESSMENT: PAIN_FUNCTIONAL_ASSESSMENT: ACTIVITIES ARE NOT PREVENTED

## 2019-05-23 ASSESSMENT — PAIN DESCRIPTION - PROGRESSION: CLINICAL_PROGRESSION: NOT CHANGED

## 2019-05-23 ASSESSMENT — PAIN DESCRIPTION - PAIN TYPE: TYPE: ACUTE PAIN

## 2019-05-23 ASSESSMENT — PAIN DESCRIPTION - FREQUENCY: FREQUENCY: CONTINUOUS

## 2019-05-23 ASSESSMENT — PAIN DESCRIPTION - DESCRIPTORS: DESCRIPTORS: ACHING

## 2019-05-23 ASSESSMENT — PAIN DESCRIPTION - ORIENTATION: ORIENTATION: LEFT

## 2019-05-23 ASSESSMENT — PAIN DESCRIPTION - ONSET: ONSET: ON-GOING

## 2019-05-23 NOTE — PROGRESS NOTES
[id number] [facility] Orthopedic Surgery   Progress Note      S/P :  SUBJECTIVE  Arouses to name but tired Not as talkative today. Daughter at bedside. . Pain is not  described in right shoulder or left hip at this time. OBJECTIVE              Physical                      VITALS:  /64   Pulse 65   Temp 98.4 °F (36.9 °C) (Oral)   Resp 16   Ht 5' 5\" (1.651 m)   Wt 140 lb 10.5 oz (63.8 kg)   SpO2 95%   BMI 23.41 kg/m²                     MUSCULOSKELETAL:  left foot NVI. Wiggles toes to command. Pedal pulses are palpable. right hand NVI. Wiggles fingers to command. Radial pulses are palpable. NEUROLOGIC:                                  Sensory:  Touch:  Right Upper Extremity:  normal  Left Lower Extremity:  normal                                                 Surgical wound appears clean and dry left hip. Right arm in sling.  Notable bruising at left hip and right shoulder , only mildly increased from yesterday    Data       CBC:   Lab Results   Component Value Date    WBC 8.1 05/23/2019    RBC 2.02 05/23/2019    HGB 6.6 05/23/2019    HCT 19.3 05/23/2019    MCV 95.5 05/23/2019    MCH 32.6 05/23/2019    MCHC 34.1 05/23/2019    RDW 17.3 05/23/2019     05/23/2019    MPV 7.5 05/23/2019        WBC:    Lab Results   Component Value Date    WBC 8.1 05/23/2019        Hemoglobin/Hematocrit:    Lab Results   Component Value Date    HGB 6.6 05/23/2019    HCT 19.3 05/23/2019        PT/INR:    Lab Results   Component Value Date    PROTIME 29.6 02/02/2019    INR 2.60 02/02/2019              Current Inpatient Medications             Current Facility-Administered Medications: 0.9 % sodium chloride infusion 250 mL, 250 mL, Intravenous, Once  0.9 % sodium chloride bolus, 250 mL, Intravenous, Once  lactobacillus (CULTURELLE) capsule 1 capsule, 1 capsule, Oral, Daily with breakfast  acetaminophen (TYLENOL) tablet 650 mg, 650 mg, Oral, 3 times per day  morphine (PF) injection 2 mg, 2 mg, Intravenous, Q4H

## 2019-05-23 NOTE — PROGRESS NOTES
Occupational [address] [facility]  [id number]  [id number]    Attempted to see for OT follow up session this pm. Patient is receiving blood at this time. Will attempt to see on 5/24/19 as able. If discharged prior to next OT session please see last daily note for discharge status.      Electronically signed by SACHA [id number] on 5/23/2019 at 2:01 PM

## 2019-05-23 NOTE — PROGRESS NOTES
Notified on-call hospitalist of patient's critical H&h value this AM via perfectserve. Awaiting response.   Electronically signed by Elicia Page RN on 5/23/2019 at 6:40 AM

## 2019-05-23 NOTE — PROGRESS NOTES
Consent for blood transfusion signed by patient daughter who is visiting currently. Patient has had altered mental status throughout the day and is unable to sign a consent form independently at this time. Patient is aware of the transfusion and has no objections to receiving blood at this time. Consent placed in patient chart.

## 2019-05-23 NOTE — PROGRESS NOTES
Hospitalist Progress Note      PCP: Dani Mcgee DO    Date of Admission: 5/20/2019    Chief Complaint: fell at assisted living. Admitted with left hip and right clavicular factures. Subjective:     Pain mostly in R shoulder this am - worse with cough. Hgb dropped to 6.6 this am.     Weak. Medications:  Reviewed    Infusion Medications   Scheduled Medications    0.9 % sodium chloride  250 mL Intravenous Once    sodium chloride  250 mL Intravenous Once    lactobacillus  1 capsule Oral Daily with breakfast    acetaminophen  650 mg Oral 3 times per day    sodium chloride flush  10 mL Intravenous 2 times per day    enoxaparin  40 mg Subcutaneous Daily    docusate sodium  100 mg Oral BID    amiodarone  100 mg Oral Daily    aspirin  81 mg Oral Daily    vitamin D  2,000 Units Oral Daily    folic acid  1 mg Oral Daily    levothyroxine  112 mcg Oral Daily    pantoprazole  40 mg Oral QAM AC    sennosides-docusate sodium  1 tablet Oral Daily     PRN Meds: morphine, HYDROcodone 5 mg - acetaminophen, sodium chloride flush, magnesium hydroxide, ondansetron      Intake/Output Summary (Last 24 hours) at 5/23/2019 1142  Last data filed at 5/23/2019 1045  Gross per 24 hour   Intake 720 ml   Output --   Net 720 ml       Physical Exam Performed:    /65   Pulse 74   Temp 98.3 °F (36.8 °C) (Oral)   Resp 16   Ht 5' 5\" (1.651 m)   Wt 140 lb 10.5 oz (63.8 kg)   SpO2 90%   BMI 23.41 kg/m²        General appearance: No apparent distress appears stated age and cooperative. HEENT Normal cephalic, atraumatic without obvious deformity. Pupils equal, round, and reactive to light. Extra ocular muscles intact. Conjunctivae/corneas clear. Neck: Supple, No jugular venous distention/bruits. Trachea midline without thyromegaly or adenopathy with full range of motion. Lungs: Clear to auscultation, bilaterally without Rales/Wheezes/Rhonchi with good respiratory effort.   Heart: Regular rate and rhythm with Normal S1/S2 without murmurs, rubs or gallops, point of maximum impulse non-displaced  Abdomen: Soft, non-tender or non-distended without rigidity or guarding and positive bowel sounds all four quadrants. Extremities: right shoulder and clavicular pain to palpation. Left leg short and externally rotated. Skin: Skin color, texture, turgor normal.  No rashes or lesions. Neurologic: Alert and oriented X 3, neurovascularly intact with sensory/motor intact upper extremities/lower extremities, bilaterally. Cranial nerves: II-XII intact, grossly non-focal.  Mental status: Alert, oriented, thought content appropriate. Capillary Refill: Acceptable  < 3 seconds  Peripheral Pulses: +3 Easily felt, not easily obliterated with pressure           Labs:   Recent Labs     05/21/19  0617 05/22/19  1003 05/23/19  0613   WBC 11.5* 10.2 8.1   HGB 8.2* 7.1* 6.6*   HCT 24.4* 21.2* 19.3*   * 119* 121*     Recent Labs     05/21/19 0617 05/22/19  1003 05/23/19  0613   * 130* 132*   K 4.2 3.8 4.0    95* 101   CO2 23 21 21   BUN 21* 34* 28*   CREATININE 0.8 1.5* 0.9   CALCIUM 8.2* 8.2* 8.3   PHOS 4.4 3.5 2.7     No results for input(s): AST, ALT, BILIDIR, BILITOT, ALKPHOS in the last 72 hours. No results for input(s): INR in the last 72 hours. No results for input(s): Clarance Delacruz in the last 72 hours. Urinalysis:      Lab Results   Component Value Date    NITRU Negative 05/20/2019    WBCUA 1 05/20/2019    RBCUA 2 05/20/2019    BLOODU Negative 05/20/2019    SPECGRAV 1.014 05/20/2019    GLUCOSEU Negative 05/20/2019       Radiology:  XR HIP 2-3 VW W PELVIS LEFT   Final Result   Postoperative changes related to interval placement of left hip arthroplasty   hardware. No significant periprosthetic lucency or acute osseous abnormality   identified. CT Head WO Contrast   Final Result   No acute intracranial abnormality.          CT Cervical Spine WO Contrast   Final Result   No acute ASA and lovenox. Daily CBC       DVT Prophylaxis: lovenox on hold. SCD instead.    Diet: Diet General  Code Status: Full Code     Dispo - inpatient.          Oliva Brown MD

## 2019-05-23 NOTE — PLAN OF CARE
Problem: Falls - Risk of:  Goal: Will remain free from falls  Description  Will remain free from falls  5/23/2019 1649 by Jimmy Ham RN  Outcome: Ongoing  Note:   Patient remains free from falls during this shift. Fall precautions remain in place. Problem: Falls - Risk of:  Goal: Absence of physical injury  Description  Absence of physical injury  5/23/2019 1649 by Jimmy Ham RN  Outcome: Ongoing  Note:   Patient remains free from physical injury during this shift. Will continue to monitor. Problem: Risk for Impaired Skin Integrity  Goal: Tissue integrity - skin and mucous membranes  Description  Structural intactness and normal physiological function of skin and  mucous membranes. 5/23/2019 1649 by Jmimy Ham RN  Outcome: Ongoing  Note:   Patient skin integrity and mucus membranes condition remains unchanged. Patient presents with blanchable redness on the back of her bilateral heels; boot placed on left foot, pillow placed under RLE for support. Problem: Pain:  Goal: Pain level will decrease  Description  Pain level will decrease  5/23/2019 1649 by Jimmy Ham RN  Outcome: Ongoing  Note:   Patient pain controlled with pharmacologic and non-pharmacologic interventions. Will continue to monitor. Problem: Pain:  Goal: Control of acute pain  Description  Control of acute pain  Outcome: Ongoing  Note:   Patient pain controlled with pharmacologic and non-pharmacologic interventions. Will continue to monitor. Problem: Pain:  Goal: Control of chronic pain  Description  Control of chronic pain  Outcome: Ongoing  Note:   Patient pain controlled with pharmacologic and non-pharmacologic interventions. Will continue to monitor.

## 2019-05-24 LAB
ALBUMIN SERPL-MCNC: 2.9 G/DL (ref 3.4–5)
ANION GAP SERPL CALCULATED.3IONS-SCNC: 12 MMOL/L (ref 3–16)
BASOPHILS ABSOLUTE: 0 K/UL (ref 0–0.2)
BASOPHILS RELATIVE PERCENT: 0.3 %
BUN BLDV-MCNC: 22 MG/DL (ref 7–20)
CALCIUM SERPL-MCNC: 8.5 MG/DL (ref 8.3–10.6)
CHLORIDE BLD-SCNC: 102 MMOL/L (ref 99–110)
CO2: 21 MMOL/L (ref 21–32)
CREAT SERPL-MCNC: 0.7 MG/DL (ref 0.6–1.2)
EOSINOPHILS ABSOLUTE: 0.2 K/UL (ref 0–0.6)
EOSINOPHILS RELATIVE PERCENT: 2.2 %
GFR AFRICAN AMERICAN: >60
GFR NON-AFRICAN AMERICAN: >60
GLUCOSE BLD-MCNC: 109 MG/DL (ref 70–99)
HCT VFR BLD CALC: 23.2 % (ref 36–48)
HEMOGLOBIN: 8 G/DL (ref 12–16)
LYMPHOCYTES ABSOLUTE: 2 K/UL (ref 1–5.1)
LYMPHOCYTES RELATIVE PERCENT: 21.1 %
MCH RBC QN AUTO: 32.7 PG (ref 26–34)
MCHC RBC AUTO-ENTMCNC: 34.6 G/DL (ref 31–36)
MCV RBC AUTO: 94.4 FL (ref 80–100)
MONOCYTES ABSOLUTE: 0.8 K/UL (ref 0–1.3)
MONOCYTES RELATIVE PERCENT: 8.5 %
NEUTROPHILS ABSOLUTE: 6.5 K/UL (ref 1.7–7.7)
NEUTROPHILS RELATIVE PERCENT: 67.9 %
PDW BLD-RTO: 17.2 % (ref 12.4–15.4)
PHOSPHORUS: 2.2 MG/DL (ref 2.5–4.9)
PLATELET # BLD: 133 K/UL (ref 135–450)
PMV BLD AUTO: 6.9 FL (ref 5–10.5)
POTASSIUM SERPL-SCNC: 4.1 MMOL/L (ref 3.5–5.1)
RBC # BLD: 2.46 M/UL (ref 4–5.2)
SODIUM BLD-SCNC: 135 MMOL/L (ref 136–145)
WBC # BLD: 9.6 K/UL (ref 4–11)

## 2019-05-24 PROCEDURE — 1200000000 HC SEMI PRIVATE

## 2019-05-24 PROCEDURE — 6370000000 HC RX 637 (ALT 250 FOR IP): Performed by: ORTHOPAEDIC SURGERY

## 2019-05-24 PROCEDURE — 36415 COLL VENOUS BLD VENIPUNCTURE: CPT

## 2019-05-24 PROCEDURE — 85025 COMPLETE CBC W/AUTO DIFF WBC: CPT

## 2019-05-24 PROCEDURE — 94760 N-INVAS EAR/PLS OXIMETRY 1: CPT

## 2019-05-24 PROCEDURE — 2580000003 HC RX 258: Performed by: INTERNAL MEDICINE

## 2019-05-24 PROCEDURE — 80069 RENAL FUNCTION PANEL: CPT

## 2019-05-24 PROCEDURE — 6370000000 HC RX 637 (ALT 250 FOR IP): Performed by: INTERNAL MEDICINE

## 2019-05-24 PROCEDURE — 6370000000 HC RX 637 (ALT 250 FOR IP): Performed by: NURSE PRACTITIONER

## 2019-05-24 RX ADMIN — DOCUSATE SODIUM 100 MG: 100 CAPSULE, LIQUID FILLED ORAL at 22:06

## 2019-05-24 RX ADMIN — Medication 1 CAPSULE: at 09:05

## 2019-05-24 RX ADMIN — PANTOPRAZOLE SODIUM 40 MG: 40 TABLET, DELAYED RELEASE ORAL at 05:08

## 2019-05-24 RX ADMIN — Medication 10 ML: at 22:06

## 2019-05-24 RX ADMIN — ACETAMINOPHEN 650 MG: 325 TABLET ORAL at 22:06

## 2019-05-24 RX ADMIN — AMIODARONE HYDROCHLORIDE 100 MG: 200 TABLET ORAL at 09:05

## 2019-05-24 RX ADMIN — Medication 10 ML: at 09:07

## 2019-05-24 RX ADMIN — SENNOSIDES, DOCUSATE SODIUM 1 TABLET: 50; 8.6 TABLET, FILM COATED ORAL at 09:05

## 2019-05-24 RX ADMIN — ACETAMINOPHEN 650 MG: 325 TABLET ORAL at 05:08

## 2019-05-24 RX ADMIN — DOCUSATE SODIUM 100 MG: 100 CAPSULE, LIQUID FILLED ORAL at 09:05

## 2019-05-24 RX ADMIN — FOLIC ACID 1 MG: 1 TABLET ORAL at 09:05

## 2019-05-24 RX ADMIN — ACETAMINOPHEN 650 MG: 325 TABLET ORAL at 13:37

## 2019-05-24 RX ADMIN — VITAMIN D, TAB 1000IU (100/BT) 2000 UNITS: 25 TAB at 09:05

## 2019-05-24 RX ADMIN — LEVOTHYROXINE SODIUM 112 MCG: 112 TABLET ORAL at 05:08

## 2019-05-24 ASSESSMENT — PAIN SCALES - GENERAL
PAINLEVEL_OUTOF10: 0
PAINLEVEL_OUTOF10: 8
PAINLEVEL_OUTOF10: 0

## 2019-05-24 ASSESSMENT — PAIN DESCRIPTION - ONSET: ONSET: GRADUAL

## 2019-05-24 ASSESSMENT — PAIN DESCRIPTION - DESCRIPTORS: DESCRIPTORS: ACHING

## 2019-05-24 ASSESSMENT — PAIN DESCRIPTION - ORIENTATION: ORIENTATION: RIGHT

## 2019-05-24 ASSESSMENT — PAIN DESCRIPTION - PAIN TYPE: TYPE: ACUTE PAIN

## 2019-05-24 ASSESSMENT — PAIN - FUNCTIONAL ASSESSMENT: PAIN_FUNCTIONAL_ASSESSMENT: ACTIVITIES ARE NOT PREVENTED

## 2019-05-24 ASSESSMENT — PAIN DESCRIPTION - PROGRESSION: CLINICAL_PROGRESSION: GRADUALLY WORSENING

## 2019-05-24 ASSESSMENT — PAIN DESCRIPTION - FREQUENCY: FREQUENCY: INTERMITTENT

## 2019-05-24 ASSESSMENT — PAIN DESCRIPTION - LOCATION: LOCATION: OTHER (COMMENT)

## 2019-05-24 NOTE — PROGRESS NOTES
Hospitalist Progress Note      PCP: Eva Bundy DO    Date of Admission: 5/20/2019    Chief Complaint: fell at assisted living. Admitted with left hip and right clavicular factures. Subjective:     Pain mostly in R shoulder this am - worse with cough. Hgb dropped to 6.6 on 5/23 - s/p pRBC     Weak. Pain controlled today. Medications:  Reviewed    Infusion Medications   Scheduled Medications    0.9 % sodium chloride  250 mL Intravenous Once    lactobacillus  1 capsule Oral Daily with breakfast    acetaminophen  650 mg Oral 3 times per day    sodium chloride flush  10 mL Intravenous 2 times per day    docusate sodium  100 mg Oral BID    amiodarone  100 mg Oral Daily    vitamin D  2,000 Units Oral Daily    folic acid  1 mg Oral Daily    levothyroxine  112 mcg Oral Daily    pantoprazole  40 mg Oral QAM AC    sennosides-docusate sodium  1 tablet Oral Daily     PRN Meds: morphine, HYDROcodone 5 mg - acetaminophen, sodium chloride flush, magnesium hydroxide, ondansetron      Intake/Output Summary (Last 24 hours) at 5/24/2019 1559  Last data filed at 5/24/2019 1350  Gross per 24 hour   Intake 600 ml   Output 2400 ml   Net -1800 ml       Physical Exam Performed:    /68   Pulse 69   Temp 100.1 °F (37.8 °C) (Oral)   Resp 16   Ht 5' 5\" (1.651 m)   Wt 140 lb 10.5 oz (63.8 kg)   SpO2 97%   BMI 23.41 kg/m²        General appearance: No apparent distress appears stated age and cooperative. HEENT Normal cephalic, atraumatic without obvious deformity. Pupils equal, round, and reactive to light. Extra ocular muscles intact. Conjunctivae/corneas clear. Neck: Supple, No jugular venous distention/bruits. Trachea midline without thyromegaly or adenopathy with full range of motion. Lungs: Clear to auscultation, bilaterally without Rales/Wheezes/Rhonchi with good respiratory effort.   Heart: Regular rate and rhythm with Normal S1/S2 without murmurs, rubs or gallops, point of maximum impulse non-displaced  Abdomen: Soft, non-tender or non-distended without rigidity or guarding and positive bowel sounds all four quadrants. Extremities: right shoulder and clavicular pain to palpation. Large R shoulder clavicular and axillary hematoma. Left leg hip region dressing intact - associated hematoma in the area as well  Neurologic: Alert and oriented X 3, neurovascularly intact with sensory/motor intact upper extremities/lower extremities, bilaterally. Cranial nerves: II-XII intact, grossly non-focal.  Mental status: Alert, oriented, thought content appropriate. Capillary Refill: Acceptable  < 3 seconds  Peripheral Pulses: +3 Easily felt, not easily obliterated with pressure           Labs:   Recent Labs     05/22/19  1003 05/23/19  0613 05/24/19  0457   WBC 10.2 8.1 9.6   HGB 7.1* 6.6* 8.0*   HCT 21.2* 19.3* 23.2*   * 121* 133*     Recent Labs     05/22/19  1003 05/23/19  0613 05/24/19  0457   * 132* 135*   K 3.8 4.0 4.1   CL 95* 101 102   CO2 21 21 21   BUN 34* 28* 22*   CREATININE 1.5* 0.9 0.7   CALCIUM 8.2* 8.3 8.5   PHOS 3.5 2.7 2.2*     No results for input(s): AST, ALT, BILIDIR, BILITOT, ALKPHOS in the last 72 hours. No results for input(s): INR in the last 72 hours. No results for input(s): Deborah Lacrosse in the last 72 hours. Urinalysis:      Lab Results   Component Value Date    NITRU Negative 05/20/2019    WBCUA 1 05/20/2019    RBCUA 2 05/20/2019    BLOODU Negative 05/20/2019    SPECGRAV 1.014 05/20/2019    GLUCOSEU Negative 05/20/2019       Radiology:  XR HIP 2-3 VW W PELVIS LEFT   Final Result   Postoperative changes related to interval placement of left hip arthroplasty   hardware. No significant periprosthetic lucency or acute osseous abnormality   identified. CT Head WO Contrast   Final Result   No acute intracranial abnormality. CT Cervical Spine WO Contrast   Final Result   No acute abnormality of the cervical spine.          XR SHOULDER pRBC  Hgb up to 8       DVT Prophylaxis: lovenox on hold. SCD instead. Diet: Diet General  Code Status: Full Code     Dispo - inpatient.  If Hgb and Cr stable, plan to d/c to [facility] tomorrow am on full dose ASA for 30days         Claudell Hard, MD

## 2019-05-24 NOTE — PLAN OF CARE
Problem: Falls - Risk of:  Goal: Will remain free from falls  Description  Will remain free from falls  5/24/2019 0741 by Josie Pearson RN  Outcome: Ongoing  Note:   Patient remains free from falls during this shift. Fall precautions remain in place. Problem: Falls - Risk of:  Goal: Absence of physical injury  Description  Absence of physical injury  5/24/2019 0741 by Josie Pearson RN  Outcome: Ongoing  Note:   Patient remains free from physical injury during this shift. Will continue to monitor. Problem: Risk for Impaired Skin Integrity  Goal: Tissue integrity - skin and mucous membranes  Description  Structural intactness and normal physiological function of skin and  mucous membranes. 5/24/2019 0741 by Josie Pearson RN  Outcome: Ongoing  Note:   Patient skin and mucus membrane condition remains unchanged at this time. Will continue to monitor. Problem: Pain:  Goal: Pain level will decrease  Description  Pain level will decrease  5/24/2019 0741 by Josie Pearson RN  Outcome: Ongoing  Note:   Patient pain managed with pharmacologic and non-pharmacologic interventions at this time. Will continue to monitor. Problem: Pain:  Goal: Control of acute pain  Description  Control of acute pain  5/24/2019 0741 by Josie Pearson RN  Outcome: Ongoing  Note:   Patient pain managed with pharmacologic and non-pharmacologic interventions at this time. Will continue to monitor. Problem: Pain:  Goal: Control of chronic pain  Description  Control of chronic pain  5/24/2019 0741 by Josie Pearson RN  Outcome: Ongoing  Note:   Patient pain managed with pharmacologic and non-pharmacologic interventions at this time. Will continue to monitor.

## 2019-05-24 NOTE — PROGRESS NOTES
Patient called out reporting pain in her R ear and R clavicle. Patient given scheduled Tylenol for these complaints. Upon inspection, it was noted that blanchable redness was starting behind both ears from oxygen tubing. Bandaids were placed behind both ears for the patient. Patient also reported being uncomfortable in bed. Patient repositioned with purple wedge. Patient was satisfied with these interventions.

## 2019-05-24 NOTE — PROGRESS NOTES
Pt alert and oriented to self in bed. Pt denying pain at this time. Pt repositioned for comfort. Purewick intact and suctioning well. Dressing intact. Call light within reach. Bed alarm on. Will continue to monitor and assess per unit protocol.   Electronically signed by Grady Garcia RN on 5/24/2019 at 3:40 AM

## 2019-05-24 NOTE — PLAN OF CARE
Problem: Falls - Risk of:  Goal: Will remain free from falls  Description  Will remain free from falls  5/23/2019 2009 by Carlito Bhatia RN  Outcome: Ongoing  Note:   Patient remains free from falls during this shift. Fall precautions remain in place   5/23/2019 1649 by Carlito Bhatia RN  Outcome: Ongoing  Note:   Patient remains free from falls during this shift. Fall precautions remain in place. Goal: Absence of physical injury  Description  Absence of physical injury  5/23/2019 2009 by Carlito Bhatia RN  Outcome: Ongoing  Note:   Patient remains free from physical injury during this shift. Will continue to monitor   5/23/2019 1649 by Carlito Bhatia RN  Outcome: Ongoing  Note:   Patient remains free from physical injury during this shift. Will continue to monitor. Problem: Risk for Impaired Skin Integrity  Goal: Tissue integrity - skin and mucous membranes  Description  Structural intactness and normal physiological function of skin and  mucous membranes. 5/23/2019 2009 by Carlito Bhatia RN  Outcome: Ongoing  Note:   Patient skin integrity and mucus membrane condition remains unchanged at this time. 5/23/2019 1649 by Carlito Bhatia RN  Outcome: Ongoing  Note:   Patient skin integrity and mucus membranes condition remains unchanged. Patient presents with blanchable redness on the back of her bilateral heels; boot placed on left foot, pillow placed under RLE for support. Problem: Pain:  Goal: Pain level will decrease  Description  Pain level will decrease  5/23/2019 2009 by Carlito Bhatia RN  Outcome: Ongoing  Note:   Patient pain levels managed with pharmacologic and non-pharmacologic interventions at this time. Will continue to monitor. 5/23/2019 1649 by Carlito Bhatia RN  Outcome: Ongoing  Note:   Patient pain controlled with pharmacologic and non-pharmacologic interventions. Will continue to monitor.    Goal: Control of acute pain  Description  Control of acute pain  5/23/2019 2009 by Carlito Bhatia RN  Outcome: Ongoing  Note:   Patient pain levels managed with pharmacologic and non-pharmacologic interventions at this time. Will continue to monitor. 5/23/2019 1649 by Omar Sandhu RN  Outcome: Ongoing  Note:   Patient pain controlled with pharmacologic and non-pharmacologic interventions. Will continue to monitor. Goal: Control of chronic pain  Description  Control of chronic pain  5/23/2019 2009 by Omar Sandhu RN  Outcome: Ongoing  Note:   Patient pain levels managed with pharmacologic and non-pharmacologic interventions at this time. Will continue to monitor. 5/23/2019 1649 by Omar Sandhu RN  Outcome: Ongoing  Note:   Patient pain controlled with pharmacologic and non-pharmacologic interventions. Will continue to monitor.

## 2019-05-24 NOTE — PLAN OF CARE
Problem: Falls - Risk of:  Goal: Will remain free from falls  Description  Will remain free from falls  5/24/2019 0341 by Junie Gutierrez RN  Outcome: Ongoing  Note:   Fall risk assessment completed. Fall precautions in place. Call light within reach. Pt educated on calling for assistance before getting up. Walkway free of clutter. Will continue to monitor. 5/23/2019 2009 by Tamar Hicks RN  Outcome: Ongoing  Note:   Patient remains free from falls during this shift. Fall precautions remain in place   5/23/2019 1649 by Tamar Hicks RN  Outcome: Ongoing  Note:   Patient remains free from falls during this shift. Fall precautions remain in place. Problem: Falls - Risk of:  Goal: Absence of physical injury  Description  Absence of physical injury  5/24/2019 0341 by Junie Gutierrez RN  Outcome: Ongoing  Note:   Pt is free of injury. No injury noted. Fall precautions in place. Call light within reach. Will monitor. 5/23/2019 2009 by Tamar Hicks RN  Outcome: Ongoing  Note:   Patient remains free from physical injury during this shift. Will continue to monitor   5/23/2019 1649 by Tamar Hicks RN  Outcome: Ongoing  Note:   Patient remains free from physical injury during this shift. Will continue to monitor. Problem: Risk for Impaired Skin Integrity  Goal: Tissue integrity - skin and mucous membranes  Description  Structural intactness and normal physiological function of skin and  mucous membranes. 5/24/2019 0341 by Junie Gutierrez RN  Outcome: Ongoing  Note:   Will assess skin every shift and monitor for friction and shearing. Will reposition patient every two hours if unable to do so independently. 5/23/2019 2009 by Tamar Hicks RN  Outcome: Ongoing  Note:   Patient skin integrity and mucus membrane condition remains unchanged at this time. 5/23/2019 1649 by Tamar Hicks RN  Outcome: Ongoing  Note:   Patient skin integrity and mucus membranes condition remains unchanged. Patient presents with blanchable redness on the back of her bilateral heels; boot placed on left foot, pillow placed under RLE for support. Problem: Pain:  Goal: Pain level will decrease  Description  Pain level will decrease  5/24/2019 0341 by Eli Shafer RN  Outcome: Ongoing  Note:   Educated patient on pain management. Will assess patients pain level per unit protocol, and provide pain management measures as needed. 5/23/2019 2009 by Jina Bullard RN  Outcome: Ongoing  Note:   Patient pain levels managed with pharmacologic and non-pharmacologic interventions at this time. Will continue to monitor. 5/23/2019 1649 by Jina Bullard RN  Outcome: Ongoing  Note:   Patient pain controlled with pharmacologic and non-pharmacologic interventions. Will continue to monitor. Problem: Pain:  Goal: Control of acute pain  Description  Control of acute pain  5/24/2019 0341 by Eli Shafer RN  Outcome: Ongoing  Note:   Patient educated on acute pain. Taught patient to use call light to ask for pain medication. PRN pain medication given for acute pain. Will continue to monitor pain per unit protocol. 5/23/2019 2009 by Jina Bullard RN  Outcome: Ongoing  Note:   Patient pain levels managed with pharmacologic and non-pharmacologic interventions at this time. Will continue to monitor. 5/23/2019 1649 by Jina Bullard RN  Outcome: Ongoing  Note:   Patient pain controlled with pharmacologic and non-pharmacologic interventions. Will continue to monitor. Problem: Pain:  Goal: Control of chronic pain  Description  Control of chronic pain  5/24/2019 0341 by Eli Shafer RN  Outcome: Ongoing  Note:   Patient educated on chronic pain. Taught patient to use call light to ask for pain medication. PRN pain medication given for chronic pain. Will continue to monitor pain per unit protocol.      5/23/2019 2009 by Jina Bullard RN  Outcome: Ongoing  Note:   Patient pain levels managed with pharmacologic and non-pharmacologic interventions at this time. Will continue to monitor. 5/23/2019 1649 by Devorah Thompson RN  Outcome: Ongoing  Note:   Patient pain controlled with pharmacologic and non-pharmacologic interventions. Will continue to monitor.

## 2019-05-24 NOTE — CARE COORDINATION
HENS submitted electronically to [facility].     Electronically signed by Hiwot Bran on 5/24/2019 at 10:13 AM

## 2019-05-24 NOTE — PROGRESS NOTES
[facility] Orthopedic Surgery   Progress Note      S/P :  SUBJECTIVE  In bed. Alert and oriented intermittently dozing to sleep. Family at bedside. . Pain is   described in right shoulder and left hip and right knee and all with the intensity of mild at rest and  Moderate to severe with activity. Pain is described as aching. OBJECTIVE              Physical                      VITALS:  /83   Pulse 66   Temp 98.6 °F (37 °C) (Oral)   Resp 16   Ht 5' 5\" (1.651 m)   Wt 140 lb 10.5 oz (63.8 kg)   SpO2 94%   BMI 23.41 kg/m²                     MUSCULOSKELETAL:  Right hand and bilateral feet NVI. Right knee Mepilex dressings with scant dark red drainage noted minimal swelling. Right knee in immobilizer. Left hip with Mepilex AG dressing clean and dry with moderate bruising but no palpable hematoma noted. Right shoulder anterior and posterior with bruising and new in anterior upper right arm as well today. Right arm in sling.                     NEUROLOGIC:                                  Sensory:  Touch:  Right Upper Extremity:  normal  Right Lower Extremity:  normal  Left Lower Extremity:  normal                                                 Data       CBC:   Lab Results   Component Value Date    WBC 9.6 05/24/2019    RBC 2.46 05/24/2019    HGB 8.0 05/24/2019    HCT 23.2 05/24/2019    MCV 94.4 05/24/2019    MCH 32.7 05/24/2019    MCHC 34.6 05/24/2019    RDW 17.2 05/24/2019     05/24/2019    MPV 6.9 05/24/2019        WBC:    Lab Results   Component Value Date    WBC 9.6 05/24/2019        Hemoglobin/Hematocrit:    Lab Results   Component Value Date    HGB 8.0 05/24/2019    HCT 23.2 05/24/2019        PT/INR:    Lab Results   Component Value Date    PROTIME 29.6 02/02/2019    INR 2.60 02/02/2019              Current Inpatient Medications             Current Facility-Administered Medications: 0.9 % sodium chloride infusion 250 mL, 250 mL, Intravenous, Once  lactobacillus (CULTURELLE) capsule 1 capsule, 1 capsule, Oral, Daily with breakfast  acetaminophen (TYLENOL) tablet 650 mg, 650 mg, Oral, 3 times per day  morphine (PF) injection 2 mg, 2 mg, Intravenous, Q4H PRN  HYDROcodone-acetaminophen (NORCO) 5-325 MG per tablet 1 tablet, 1 tablet, Oral, Q6H PRN  sodium chloride flush 0.9 % injection 10 mL, 10 mL, Intravenous, 2 times per day  sodium chloride flush 0.9 % injection 10 mL, 10 mL, Intravenous, PRN  magnesium hydroxide (MILK OF MAGNESIA) 400 MG/5ML suspension 30 mL, 30 mL, Oral, Daily PRN  ondansetron (ZOFRAN) injection 4 mg, 4 mg, Intravenous, Q6H PRN  docusate sodium (COLACE) capsule 100 mg, 100 mg, Oral, BID  amiodarone (CORDARONE) tablet 100 mg, 100 mg, Oral, Daily  vitamin D (CHOLECALCIFEROL) tablet 2,000 Units, 2,000 Units, Oral, Daily  folic acid (FOLVITE) tablet 1 mg, 1 mg, Oral, Daily  levothyroxine (SYNTHROID) tablet 112 mcg, 112 mcg, Oral, Daily  pantoprazole (PROTONIX) tablet 40 mg, 40 mg, Oral, QAM AC  sennosides-docusate sodium (SENOKOT-S) 8.6-50 MG tablet 1 tablet, 1 tablet, Oral, Daily    ASSESSMENT AND PLAN    Fall  Right shoulder pain, noted right clavicular fx distally, nonoperative in sling prn pain for comfort, No right shoulder abduction  Left hip pain. Left femoral neck fx, displaced. Hemiarthroplasty per DR Akanksha Ricardo  Right knee laceration. Wash out and close per ER. Keep dressing on right knee for 7 days then remove and eval wound, Replace new Mepilex dressing. Keep right knee in immobilizer to prevent strain on knee wound while healing. PT OT following. NWB right arm . WBAT left and right leg. ECF planned. Disposition per hospitalist  Postop anemia of blood loss Transfusion yesterday with improvement, stable VS  Lovenox as inpt then switch to ASA as ordered. (on hold for now with bruising and low HCT per hospitalist)  Postop confusion, most likely related to anesthesia and meds. Will order Tylenol ATC for pain and use narcotic sparingly. Has improved somewhat daily.            Yung Hirsch Justin  5/24/2019  9:44 AM

## 2019-05-25 VITALS
HEIGHT: 65 IN | OXYGEN SATURATION: 94 % | HEART RATE: 67 BPM | RESPIRATION RATE: 18 BRPM | BODY MASS INDEX: 24.83 KG/M2 | WEIGHT: 149.03 LBS | SYSTOLIC BLOOD PRESSURE: 134 MMHG | DIASTOLIC BLOOD PRESSURE: 62 MMHG | TEMPERATURE: 98 F

## 2019-05-25 LAB
ALBUMIN SERPL-MCNC: 2.4 G/DL (ref 3.4–5)
ANION GAP SERPL CALCULATED.3IONS-SCNC: 11 MMOL/L (ref 3–16)
BASOPHILS ABSOLUTE: 0 K/UL (ref 0–0.2)
BASOPHILS RELATIVE PERCENT: 0.2 %
BUN BLDV-MCNC: 19 MG/DL (ref 7–20)
CALCIUM SERPL-MCNC: 8.4 MG/DL (ref 8.3–10.6)
CHLORIDE BLD-SCNC: 105 MMOL/L (ref 99–110)
CO2: 23 MMOL/L (ref 21–32)
CREAT SERPL-MCNC: 0.6 MG/DL (ref 0.6–1.2)
EOSINOPHILS ABSOLUTE: 0.3 K/UL (ref 0–0.6)
EOSINOPHILS RELATIVE PERCENT: 4.3 %
GFR AFRICAN AMERICAN: >60
GFR NON-AFRICAN AMERICAN: >60
GLUCOSE BLD-MCNC: 100 MG/DL (ref 70–99)
HCT VFR BLD CALC: 21.6 % (ref 36–48)
HEMOGLOBIN: 7.3 G/DL (ref 12–16)
LYMPHOCYTES ABSOLUTE: 1.3 K/UL (ref 1–5.1)
LYMPHOCYTES RELATIVE PERCENT: 16.4 %
MCH RBC QN AUTO: 31.5 PG (ref 26–34)
MCHC RBC AUTO-ENTMCNC: 33.6 G/DL (ref 31–36)
MCV RBC AUTO: 93.9 FL (ref 80–100)
MONOCYTES ABSOLUTE: 0.7 K/UL (ref 0–1.3)
MONOCYTES RELATIVE PERCENT: 8.5 %
NEUTROPHILS ABSOLUTE: 5.5 K/UL (ref 1.7–7.7)
NEUTROPHILS RELATIVE PERCENT: 70.6 %
PDW BLD-RTO: 16.7 % (ref 12.4–15.4)
PHOSPHORUS: 2.5 MG/DL (ref 2.5–4.9)
PLATELET # BLD: 153 K/UL (ref 135–450)
PMV BLD AUTO: 7 FL (ref 5–10.5)
POTASSIUM SERPL-SCNC: 4.2 MMOL/L (ref 3.5–5.1)
RBC # BLD: 2.3 M/UL (ref 4–5.2)
SODIUM BLD-SCNC: 139 MMOL/L (ref 136–145)
WBC # BLD: 7.8 K/UL (ref 4–11)

## 2019-05-25 PROCEDURE — 6370000000 HC RX 637 (ALT 250 FOR IP): Performed by: ORTHOPAEDIC SURGERY

## 2019-05-25 PROCEDURE — 36415 COLL VENOUS BLD VENIPUNCTURE: CPT

## 2019-05-25 PROCEDURE — 6370000000 HC RX 637 (ALT 250 FOR IP): Performed by: NURSE PRACTITIONER

## 2019-05-25 PROCEDURE — 85025 COMPLETE CBC W/AUTO DIFF WBC: CPT

## 2019-05-25 PROCEDURE — 2580000003 HC RX 258: Performed by: INTERNAL MEDICINE

## 2019-05-25 PROCEDURE — 6370000000 HC RX 637 (ALT 250 FOR IP): Performed by: INTERNAL MEDICINE

## 2019-05-25 PROCEDURE — 2700000000 HC OXYGEN THERAPY PER DAY

## 2019-05-25 PROCEDURE — 80069 RENAL FUNCTION PANEL: CPT

## 2019-05-25 PROCEDURE — 94761 N-INVAS EAR/PLS OXIMETRY MLT: CPT

## 2019-05-25 RX ORDER — FERROUS SULFATE 325(65) MG
325 TABLET ORAL DAILY
Qty: 30 TABLET | Refills: 3 | Status: SHIPPED | OUTPATIENT
Start: 2019-05-25

## 2019-05-25 RX ADMIN — ACETAMINOPHEN 650 MG: 325 TABLET ORAL at 07:03

## 2019-05-25 RX ADMIN — FOLIC ACID 1 MG: 1 TABLET ORAL at 09:14

## 2019-05-25 RX ADMIN — PANTOPRAZOLE SODIUM 40 MG: 40 TABLET, DELAYED RELEASE ORAL at 07:03

## 2019-05-25 RX ADMIN — DOCUSATE SODIUM 100 MG: 100 CAPSULE, LIQUID FILLED ORAL at 09:14

## 2019-05-25 RX ADMIN — AMIODARONE HYDROCHLORIDE 100 MG: 200 TABLET ORAL at 09:14

## 2019-05-25 RX ADMIN — Medication 1 CAPSULE: at 09:14

## 2019-05-25 RX ADMIN — SENNOSIDES, DOCUSATE SODIUM 1 TABLET: 50; 8.6 TABLET, FILM COATED ORAL at 09:14

## 2019-05-25 RX ADMIN — VITAMIN D, TAB 1000IU (100/BT) 2000 UNITS: 25 TAB at 09:14

## 2019-05-25 RX ADMIN — LEVOTHYROXINE SODIUM 112 MCG: 112 TABLET ORAL at 07:03

## 2019-05-25 RX ADMIN — Medication 10 ML: at 09:15

## 2019-05-25 ASSESSMENT — PAIN SCALES - GENERAL
PAINLEVEL_OUTOF10: 0
PAINLEVEL_OUTOF10: 0

## 2019-05-25 NOTE — DISCHARGE SUMMARY
clavicular and hip fracture with extensive area hematoma, and as expected post op. Hgb down to 6.6 on 5/23 - s/p pRBC transfusion. Baseline around 11. Held ASA and lovenox. Will need repeat CBC Tuesday  Start iron supplement.             Pt's overall status declining. Poor level of activity, decreased level of alertness - sleeping a lot. Poor PO intake. Discussed with pt and caregivers at bedside at length. Overall prognosis guarded. Physical Exam Performed:     /60   Pulse 73   Temp 98.2 °F (36.8 °C) (Oral)   Resp 18   Ht 5' 5\" (1.651 m)   Wt 149 lb 0.5 oz (67.6 kg)   SpO2 97%   BMI 24.80 kg/m²       General appearance: No apparent distress appears stated age and cooperative. HEENT Normal cephalic, atraumatic without obvious deformity.  Pupils equal, round, and reactive to light.  Extra ocular muscles intact.  Conjunctivae/corneas clear. Neck: Supple, No jugular venous distention/bruits.  Trachea midline without thyromegaly or adenopathy with full range of motion. Lungs: Clear to auscultation, bilaterally without Rales/Wheezes/Rhonchi with good respiratory effort. Heart: Regular rate and rhythm with Normal S1/S2 without murmurs, rubs or gallops, point of maximum impulse non-displaced  Abdomen: Soft, non-tender or non-distended without rigidity or guarding and positive bowel sounds all four quadrants. Extremities: right shoulder and clavicular pain to palpation. Large R shoulder clavicular and axillary hematoma. Left leg hip region dressing intact - associated bruising and hematoma in the area as well  Neurologic: Alert and oriented X 3, neurovascularly intact with sensory/motor intact upper extremities/lower extremities, bilaterally.  Cranial nerves: II-XII intact, grossly non-focal.  Mental status: Alert, oriented, thought content appropriate.   Capillary Refill: Acceptable  < 3 seconds  Peripheral Pulses: +3 Easily felt, not easily obliterated with pressure          Labs: For convenience and continuity at follow-up the following most recent labs are provided:      CBC:    Lab Results   Component Value Date    WBC 7.8 05/25/2019    HGB 7.3 05/25/2019    HCT 21.6 05/25/2019     05/25/2019       Renal:    Lab Results   Component Value Date     05/25/2019    K 4.2 05/25/2019    K 3.9 11/14/2018     05/25/2019    CO2 23 05/25/2019    BUN 19 05/25/2019    CREATININE 0.6 05/25/2019    CALCIUM 8.4 05/25/2019    PHOS 2.5 05/25/2019         Significant Diagnostic Studies    Radiology:   XR HIP 2-3 VW W PELVIS LEFT   Final Result   Postoperative changes related to interval placement of left hip arthroplasty   hardware. No significant periprosthetic lucency or acute osseous abnormality   identified. CT Head WO Contrast   Final Result   No acute intracranial abnormality. CT Cervical Spine WO Contrast   Final Result   No acute abnormality of the cervical spine. XR SHOULDER RIGHT (MIN 2 VIEWS)   Final Result   Right distal clavicular fracture. This may be subacute to chronic. Degenerative changes in the glenohumeral joint. No evidence of glenohumeral   dislocation. XR CHEST PORTABLE   Final Result   No acute process. Unchanged mild pulmonary vascular congestion. XR FEMUR LEFT (MIN 2 VIEWS)   Final Result   Left femur and hip: Acute displaced fracture of the left femoral neck. XR HIP LEFT (2-3 VIEWS)   Final Result   Left femur and hip: Acute displaced fracture of the left femoral neck. XR KNEE RIGHT (1-2 VIEWS)   Final Result   Soft tissue swelling and joint effusion without evidence of fracture. Advanced tricompartmental osteoarthritis. Consults:     IP CONSULT TO HOSPITALIST  IP CONSULT TO SOCIAL WORK    Disposition:  ECF     Condition at Discharge: Stable    Discharge Instructions/Follow-up:  Facility MD 2-3 days. CBC Tuesday.      Code Status:  DNR-CCA     Activity: activity as tolerated    Diet: regular diet      Discharge Medications:     Current Discharge Medication List           Details   ferrous sulfate (SHARDA-LAST) 325 (65 Fe) MG tablet Take 1 tablet by mouth daily  Qty: 30 tablet, Refills: 3      HYDROcodone-acetaminophen (NORCO) 5-325 MG per tablet Take 1 tablet by mouth every 6 hours as needed for Pain for up to 7 days. Qty: 30 tablet, Refills: 0    Comments: Reduce doses taken as pain becomes manageable  Associated Diagnoses: Closed displaced fracture of left femoral neck ([address] Closed nondisplaced fracture of right clavicle, unspecified part of clavicle, initial encounter; Right knee injury, initial encounter      aspirin EC 81 MG EC tablet Take 1 tablet by mouth 2 times daily Please avoid missing doses. Qty: 60 tablet, Refills: 0              Details   diazepam (VALIUM) 2 MG tablet Take 1 tablet by mouth nightly as needed for Anxiety or Sleep for up to 5 days. Qty: 5 tablet, Refills: 2    Associated Diagnoses: Vertigo              Details   pantoprazole (PROTONIX) 40 MG tablet Take 40 mg by mouth daily      levothyroxine (SYNTHROID) 112 MCG tablet Take 112 mcg by mouth Daily      furosemide (LASIX) 20 MG tablet Take 20 mg by mouth every 48 hours      folic acid (FOLVITE) 1 MG tablet Take 1 mg by mouth daily      amiodarone (PACERONE) 100 MG tablet TAKE 1 TABLET BY MOUTH EVERY DAY  Qty: 90 tablet, Refills: 1    Comments: **Patient requests 90 days supply**      Omega-3 Fatty Acids (FISH OIL) 1000 MG CAPS Take 1,000 mg by mouth 2 times daily       Cholecalciferol (VITAMIN D) 2000 units CAPS capsule Take 2,000 Units by mouth daily       diclofenac sodium (VOLTAREN) 1 % GEL Apply 4 g topically 4 times daily  Qty: 5 Tube, Refills: 0      senna-docusate (PERICOLACE) 8.6-50 MG per tablet Take 1 tablet by mouth daily      metroNIDAZOLE (METROGEL) 0.75 % gel Apply 1 g topically daily as needed (rosacea) Apply topically daily to face.        nystatin (MYCOSTATIN) 204385 UNIT/GM powder Apply 3 times daily. Qty: 30 g, Refills: 3    Associated Diagnoses: Candidal intertrigo             Time Spent on discharge is more than 30 minutes in the examination, evaluation, counseling and review of medications and discharge plan. Signed:    Delphia Apley, MD   5/25/2019      Thank you Brigida Ceja DO for the opportunity to be involved in this patient's care. If you have any questions or concerns please feel free to contact me at [phone].

## 2019-05-25 NOTE — PROGRESS NOTES
Report called to receiving nurse Chasidy Stephens.   Electronically signed by Lamar Carlson RN on 5/25/2019 at 1:40 PM

## 2019-05-25 NOTE — PROGRESS NOTES
Pt awake and alert, oriented to self and place. No c/o pain at this time. Assessment completed and charted. Daughter visiting. Pt used IS and only able to get it up to 500. Assisted to reposition. Right arm in a sling and right leg in an immobilizer. Pt denies needs at this time. Call light in reach. Will monitor.

## 2019-05-25 NOTE — PLAN OF CARE
Problem: Falls - Risk of:  Goal: Will remain free from falls  Description  Will remain free from falls  Outcome: Ongoing  Goal: Absence of physical injury  Description  Absence of physical injury  Outcome: Ongoing   Fall risk assessment completed every shift. All precautions in place. Pt has call light within reach at all times. Room clear of clutter. Pt aware to call for assistance when getting up. Problem: Risk for Impaired Skin Integrity  Goal: Tissue integrity - skin and mucous membranes  Description  Structural intactness and normal physiological function of skin and  mucous membranes. Outcome: Ongoing   Skin assessment completed every shift. Pt assessed for incontinence, appropriate barrier wipes used prn. Pt encouraged to turn/rotate every 2 hours. Assistance provided if pt unable to do so themselves. Problem: Pain:  Goal: Pain level will decrease  Description  Pain level will decrease  Outcome: Ongoing  Goal: Control of acute pain  Description  Control of acute pain  Outcome: Ongoing  Goal: Control of chronic pain  Description  Control of chronic pain  Outcome: Ongoing   Pain/discomfort being managed with scheduled Tylenol per MD orders. Pt able to express presence and absence of pain and rate pain appropriately using numerical scale.

## 2019-05-25 NOTE — PROGRESS NOTES
Attempted to call report to [facility] twice with no answer. Message left for them to call me back. Pt transported out via first care stretcher. Family at side with personal belongings.   Electronically signed by Zaria Griffith RN on 5/25/2019 at 1:27 PM

## 2019-05-25 NOTE — PROGRESS NOTES
In to assess pt. Oriented to self and place only. VSS. Denies pain. Left hip dressing D&I, no new drainage noted to rt knee dressing with knee immobilizer on, and sling remains in place on RUE. Normal sensation, brisk cap refill and normal pulses to all extremities. IV site WDL. Dr. Yvette arroyo served per family request with medication concerns particularly why she hasn't been getting her lasix since admission. Will continue to monitor. Call light within reach.   Electronically signed by Lamar Carlson RN on 5/25/2019 at 10:44 AM

## 2019-05-25 NOTE — CARE COORDINATION
Call to [facility] to inform of dc order and plan for transport to [facility] at 1pm.  Hens done. Report to [phone].    Fax: [phone]    Electronically signed by JAGDISH Madrigal on 5/25/2019 at 8:49 AM

## 2019-05-28 ENCOUNTER — CARE COORDINATION (OUTPATIENT)
Dept: CASE MANAGEMENT | Age: 84
End: 2019-05-28

## 2019-05-30 ENCOUNTER — TELEPHONE (OUTPATIENT)
Dept: ORTHOPEDIC SURGERY | Age: 84
End: 2019-05-30

## 2019-05-30 NOTE — TELEPHONE ENCOUNTER
Per geoffrey note on 05/24/2019 knee immobilizer is to remain on to keep strain on knee for wound healing. Ranulfo Mota to keep on until post op appt on 06/07.

## 2019-06-06 ENCOUNTER — CARE COORDINATION (OUTPATIENT)
Dept: CASE MANAGEMENT | Age: 84
End: 2019-06-06

## 2019-06-06 NOTE — CARE COORDINATION
Patient: Azalia Roland   Patient : 1922  MRN: [id number]    Reason for Admission:  2019    OPERATION PERFORMED:  Open treatment of left femoral neck fracture with bipolar press-fit hemiarthroplasty.     SURGEON:  Ramandeep Giles MD  Facility: [address] [place] [address] with [facility], Nursing staff from [facility] for status update. Incision status: healing well, CDI    Pain control: minimal pain or discomfort , does not request or require narcotics    Therapies involved: PT/OT daily    Tolerating: very well    Barriers to being discharged home: Lives in the assisted living at [facility] and will mostly likely return at discharge.  She will see the surgeon tomorrow    Projected discharge date: TBD    Discharge needs: return to Assisted Living, mobility, safety, home care and continues therapy    Briana Vaughan MSN, MA, RN  Care Transition Coordinator  [phone]

## 2019-06-07 ENCOUNTER — OFFICE VISIT (OUTPATIENT)
Dept: ORTHOPEDIC SURGERY | Age: 84
End: 2019-06-07

## 2019-06-07 VITALS — HEIGHT: 65 IN | BODY MASS INDEX: 24.83 KG/M2 | WEIGHT: 149.03 LBS

## 2019-06-07 DIAGNOSIS — M25.552 LEFT HIP PAIN: ICD-10-CM

## 2019-06-07 DIAGNOSIS — S81.011A KNEE LACERATION, RIGHT, INITIAL ENCOUNTER: ICD-10-CM

## 2019-06-07 DIAGNOSIS — M89.8X1 PAIN OF RIGHT CLAVICLE: Primary | ICD-10-CM

## 2019-06-07 DIAGNOSIS — S72.002A CLOSED DISPLACED FRACTURE OF LEFT FEMORAL NECK (HCC): ICD-10-CM

## 2019-06-07 DIAGNOSIS — S42.031A CLOSED DISPLACED FRACTURE OF ACROMIAL END OF RIGHT CLAVICLE, INITIAL ENCOUNTER: ICD-10-CM

## 2019-06-07 PROCEDURE — 99024 POSTOP FOLLOW-UP VISIT: CPT | Performed by: NURSE PRACTITIONER

## 2019-06-07 PROCEDURE — APPNB30 APP NON BILLABLE TIME 0-30 MINS: Performed by: NURSE PRACTITIONER

## 2019-06-07 NOTE — PROGRESS NOTES
every 48 hours      folic acid (FOLVITE) 1 MG tablet Take 1 mg by mouth daily      amiodarone (PACERONE) 100 MG tablet TAKE 1 TABLET BY MOUTH EVERY DAY 90 tablet 1    Omega-3 Fatty Acids (FISH OIL) 1000 MG CAPS Take 1,000 mg by mouth 2 times daily       Cholecalciferol (VITAMIN D) 2000 units CAPS capsule Take 2,000 Units by mouth daily       diclofenac sodium (VOLTAREN) 1 % GEL Apply 4 g topically 4 times daily (Patient taking differently: Apply 4 g topically 4 times daily as needed for Pain ) 5 Tube 0    senna-docusate (PERICOLACE) 8.6-50 MG per tablet Take 1 tablet by mouth daily      metroNIDAZOLE (METROGEL) 0.75 % gel Apply 1 g topically daily as needed (rosacea) Apply topically daily to face.  nystatin (MYCOSTATIN) 892363 UNIT/GM powder Apply 3 times daily. (Patient taking differently: Apply 1 each topically 3 times daily as needed (skin folds) Apply 3 times daily.) 30 g 3     No current facility-administered medications for this visit. Pertinent items are noted in HPI  Review of systems reviewed from Patient History Form dated on today from the nursing home and available in the patient's chart under the Media tab. No change noted. PHYSICAL EXAMINATION:  Ms. Shanna Samano is a very pleasant 80 y.o.  female who presents today in no acute distress, awake, alert, and oriented. She is well dressed, nourished and  groomed. Patient with normal affect. Height is  5' 5\" (1.651 m), weight is 149 lb 0.5 oz (67.6 kg), Body mass index is 24.8 kg/m². Resting respiratory rate is 16. On examination of the left hip: The incision is completely healed left hip. No signs of any erythema or drainage. She has no pain with the active or passive range of motion of the left hip. She has intact sensation, distally, and she is neurovascularly intact. On evaluation of her bilateral upper extremity, there is no deformity right shoulder. There is minimal swelling and minimal ecchymosis.   She is mildly needed. Follow-up in 2 weeks and will remove the sutures at that time. As this patient has demonstrated risk factors for osteoporosis, such as age greater than [de-identified] years and evidence of a fracture, I have referred the patient back to the primary care physician for evaluation for osteoporosis, including consideration for DEXA scanning, if this is felt to be clinically indicated. The patient is advised to contact the primary care physician to follow-up for further evaluation.        Misael Harmon, APRN - CNP

## 2019-06-10 ENCOUNTER — TELEPHONE (OUTPATIENT)
Dept: ORTHOPEDIC SURGERY | Age: 84
End: 2019-06-10

## 2019-06-10 NOTE — TELEPHONE ENCOUNTER
Pt daughter is calling to see if she can get a copy of the work note sent to [address] they called last week. Pt is calling to come, and wanted to get paper for nursing home she will pick it at [facility].  Please advise.   They said it was not faxed over

## 2019-06-24 ENCOUNTER — CARE COORDINATION (OUTPATIENT)
Dept: CASE MANAGEMENT | Age: 84
End: 2019-06-24

## 2019-06-24 NOTE — CARE COORDINATION
Patient: Compa Graham   Patient : 1922  MRN: [id number]    Reason for Admission:  2019    OPERATION PERFORMED:  Open treatment of left femoral neck fracture with bipolar press-fit hemiarthroplasty.     SURGEON:  Mini Welch MD  Facility: [facility]    Attempted to reach patient by phone for [facility] care transition follow up call. No answer , left a message on voicemail with reason for call and contact information.      Flora Martines MSN, MA, RN  Care Transition Coordinator  [phone]

## 2019-06-26 ENCOUNTER — OFFICE VISIT (OUTPATIENT)
Dept: ORTHOPEDIC SURGERY | Age: 84
End: 2019-06-26

## 2019-06-26 VITALS — HEIGHT: 65 IN | RESPIRATION RATE: 16 BRPM | WEIGHT: 149 LBS | BODY MASS INDEX: 24.83 KG/M2

## 2019-06-26 DIAGNOSIS — S81.011A KNEE LACERATION, RIGHT, INITIAL ENCOUNTER: ICD-10-CM

## 2019-06-26 DIAGNOSIS — S80.02XA CONTUSION OF LEFT KNEE, INITIAL ENCOUNTER: Primary | ICD-10-CM

## 2019-06-26 DIAGNOSIS — S72.002A CLOSED DISPLACED FRACTURE OF LEFT FEMORAL NECK (HCC): ICD-10-CM

## 2019-06-26 DIAGNOSIS — S42.034A CLOSED NONDISPLACED FRACTURE OF ACROMIAL END OF RIGHT CLAVICLE, INITIAL ENCOUNTER: ICD-10-CM

## 2019-06-26 PROCEDURE — 99024 POSTOP FOLLOW-UP VISIT: CPT | Performed by: NURSE PRACTITIONER

## 2019-06-26 PROCEDURE — APPNB30 APP NON BILLABLE TIME 0-30 MINS: Performed by: NURSE PRACTITIONER

## 2019-06-26 NOTE — PROGRESS NOTES
History   Problem Relation Age of Onset    Cancer Mother     Heart Disease Father      Social History     Socioeconomic History    Marital status:      Spouse name: Not on file    Number of children: 11    Years of education: Not on file    Highest education level: Not on file   Occupational History    Occupation: Retired   Social Needs    Financial resource strain: Not on file    Food insecurity:     Worry: Not on file     Inability: Not on file   Zando needs:     Medical: Not on file     Non-medical: Not on file   Tobacco Use    Smoking status: Former Smoker     Last attempt to quit: 1950     Years since quittin.5    Smokeless tobacco: Never Used    Tobacco comment: social, quit over 50 years ago   Substance and Sexual Activity    Alcohol use: No     Alcohol/week: 0.0 oz    Drug use: No    Sexual activity: Not Currently   Lifestyle    Physical activity:     Days per week: Not on file     Minutes per session: Not on file    Stress: Not on file   Relationships    Social connections:     Talks on phone: Not on file     Gets together: Not on file     Attends Mandaen service: Not on file     Active member of club or organization: Not on file     Attends meetings of clubs or organizations: Not on file     Relationship status: Not on file    Intimate partner violence:     Fear of current or ex partner: Not on file     Emotionally abused: Not on file     Physically abused: Not on file     Forced sexual activity: Not on file   Other Topics Concern    Not on file   Social History Narrative    Not on file     Current Outpatient Medications   Medication Sig Dispense Refill    ferrous sulfate (SHARDA-LAST) 325 (65 Fe) MG tablet Take 1 tablet by mouth daily 30 tablet 3    aspirin EC 81 MG EC tablet Take 1 tablet by mouth 2 times daily Please avoid missing doses.  60 tablet 0    pantoprazole (PROTONIX) 40 MG tablet Take 40 mg by mouth daily      levothyroxine (SYNTHROID) 112 MCG of her bilateral upper extremity, there is no deformity right shoulder. There is minimal swelling and minimal ecchymosis. She is mildly tender to palpation over the clavicle, and otherwise nontender over the remainder of the extremity. Range of motion is decreased right shoulder. The skin overlying the right shoulder is intact without evidence of lesion, laceration or skin tenting. Distal pulses are 2+ and symmetric bilaterally. Sensation is grossly intact to light touch and symmetric bilaterally. The skin laceration the right knee intact, wound is draining bloody. Sutures removed today and tolerated well. Steri-Strips are intact. No erythema or signs of infection. She has decreased range of motion the right knee. She is neurovascularly intact with good pedal pulse right lower extremity. IMAGING:  X-rays were taken in the office 6/7/2019, AP pelvis and 2 views of the left hip and femur, and showed the Press-Fit hemiarthroplasty in good position. No signs of any lucency. Xrays 2 views of the right clavicle taken 6/7/2019 in the office were reviewed and showed right distal clavicle fracture. IMPRESSION:  4 weeks out from   1-Left hip displaced femoral neck fracture, status post Press-Fit bipolar hemiarthroplasty. 2-Right distal clavicle minimally displaced fracture  3-Large laceration right knee    PLAN:  For the left hip: I have told the patient to continue PT, weightbearing as tolerated, as well as strengthening exercises. The patient will come back for a follow up in 6 weeks. At that time, we will take AP pelvis and 2 views of the affected hip. For the clavicle: She can be WBAT, with no heavy impact activities, and was instructed to work on ROM. We discussed the risk of nonunion and or malunion. We will see her  back in 6 weeks at which time we will get a new xray of the right clavicle. For the laceration: We discussed the findings with the patient. Recommend removing the sutures today.

## 2019-07-02 ENCOUNTER — TELEPHONE (OUTPATIENT)
Dept: CARDIOLOGY CLINIC | Age: 84
End: 2019-07-02

## 2019-07-02 RX ORDER — ASPIRIN 81 MG/1
81 TABLET ORAL DAILY
Qty: 60 TABLET | Refills: 0
Start: 2019-07-02 | End: 2019-08-01

## 2019-07-03 ENCOUNTER — CARE COORDINATION (OUTPATIENT)
Dept: CASE MANAGEMENT | Age: 84
End: 2019-07-03

## 2019-07-03 NOTE — CARE COORDINATION
Patient: Alie Wagner   Patient : 1922  MRN: [id number]    Reason for Admission:  2019    OPERATION PERFORMED:  Open treatment of left femoral neck fracture with bipolar press-fit hemiarthroplasty.     SURGEON:  Dennis Cramer MD  Facility: [facility]    Attempted to reach patient by phone for [facility] care transition follow up call. No answer , left a message on voicemail with reason for call and contact information.      Maite Gay MSN, MA, RN  Care Transition Coordinator  [phone]

## 2019-07-19 ENCOUNTER — OFFICE VISIT (OUTPATIENT)
Dept: ORTHOPEDIC SURGERY | Age: 84
End: 2019-07-19
Payer: MEDICARE

## 2019-07-19 VITALS — HEIGHT: 65 IN | WEIGHT: 149 LBS | BODY MASS INDEX: 24.83 KG/M2

## 2019-07-19 DIAGNOSIS — S81.011A KNEE LACERATION, RIGHT, INITIAL ENCOUNTER: Primary | ICD-10-CM

## 2019-07-19 DIAGNOSIS — S42.034A CLOSED NONDISPLACED FRACTURE OF ACROMIAL END OF RIGHT CLAVICLE, INITIAL ENCOUNTER: ICD-10-CM

## 2019-07-19 DIAGNOSIS — S72.002A CLOSED DISPLACED FRACTURE OF LEFT FEMORAL NECK (HCC): ICD-10-CM

## 2019-07-19 PROCEDURE — 99024 POSTOP FOLLOW-UP VISIT: CPT | Performed by: ORTHOPAEDIC SURGERY

## 2019-07-19 PROCEDURE — 99213 OFFICE O/P EST LOW 20 MIN: CPT | Performed by: ORTHOPAEDIC SURGERY

## 2019-07-19 PROCEDURE — 4040F PNEUMOC VAC/ADMIN/RCVD: CPT | Performed by: ORTHOPAEDIC SURGERY

## 2019-07-19 PROCEDURE — G8427 DOCREV CUR MEDS BY ELIG CLIN: HCPCS | Performed by: ORTHOPAEDIC SURGERY

## 2019-07-19 PROCEDURE — G8420 CALC BMI NORM PARAMETERS: HCPCS | Performed by: ORTHOPAEDIC SURGERY

## 2019-07-19 PROCEDURE — 1123F ACP DISCUSS/DSCN MKR DOCD: CPT | Performed by: ORTHOPAEDIC SURGERY

## 2019-07-19 PROCEDURE — 1090F PRES/ABSN URINE INCON ASSESS: CPT | Performed by: ORTHOPAEDIC SURGERY

## 2019-07-19 PROCEDURE — 1036F TOBACCO NON-USER: CPT | Performed by: ORTHOPAEDIC SURGERY

## 2019-07-19 NOTE — PROGRESS NOTES
PERCUTANEOUS BALLOON VALVULOPLASTY  2016    [facility]       Social History     Socioeconomic History    Marital status:      Spouse name: Not on file    Number of children: 11    Years of education: Not on file    Highest education level: Not on file   Occupational History    Occupation: Retired   Social Needs    Financial resource strain: Not on file    Food insecurity:     Worry: Not on file     Inability: Not on file   [employer] needs:     Medical: Not on file     Non-medical: Not on file   Tobacco Use    Smoking status: Former Smoker     Last attempt to quit: 1950     Years since quittin.5    Smokeless tobacco: Never Used    Tobacco comment: social, quit over 50 years ago   Substance and Sexual Activity    Alcohol use: No     Alcohol/week: 0.0 standard drinks    Drug use: No    Sexual activity: Not Currently   Lifestyle    Physical activity:     Days per week: Not on file     Minutes per session: Not on file    Stress: Not on file   Relationships    Social connections:     Talks on phone: Not on file     Gets together: Not on file     Attends Orthodox service: Not on file     Active member of club or organization: Not on file     Attends meetings of clubs or organizations: Not on file     Relationship status: Not on file    Intimate partner violence:     Fear of current or ex partner: Not on file     Emotionally abused: Not on file     Physically abused: Not on file     Forced sexual activity: Not on file   Other Topics Concern    Not on file   Social History Narrative    Not on file       Family History   Problem Relation Age of Onset    Cancer Mother     Heart Disease Father        Current Outpatient Medications on File Prior to Visit   Medication Sig Dispense Refill    aspirin EC 81 MG EC tablet Take 1 tablet by mouth daily Please avoid missing doses.  60 tablet 0    ferrous sulfate (SHARDA-LAST) 325 (65 Fe) MG tablet Take 1 tablet by mouth daily 30 tablet 3   

## 2019-07-22 ENCOUNTER — TELEPHONE (OUTPATIENT)
Dept: ORTHOPEDIC SURGERY | Age: 84
End: 2019-07-22

## 2019-07-22 ENCOUNTER — CARE COORDINATION (OUTPATIENT)
Dept: CASE MANAGEMENT | Age: 84
End: 2019-07-22

## 2019-07-22 NOTE — TELEPHONE ENCOUNTER
Patient daughter Tavon Reeves called to request an order for a lift chair for patient. She states that patient will be moved to her home and patient will be unable to walk up and down the stairs. Also, she is wondering if the patient can do any exercises while laying in bed to help her not get stiff?     Please call Stephane Feliz:  [phone]

## 2019-08-06 ENCOUNTER — TELEPHONE (OUTPATIENT)
Dept: CARDIOLOGY CLINIC | Age: 84
End: 2019-08-06

## 2019-08-12 ENCOUNTER — CARE COORDINATION (OUTPATIENT)
Dept: CASE MANAGEMENT | Age: 84
End: 2019-08-12

## 2019-08-12 NOTE — CARE COORDINATION
Patient: Alie Wagner   Patient : 1922  MRN: [id number]    Reason for Admission:  2019    OPERATION PERFORMED:  Open treatment of left femoral neck fracture with bipolar press-fit hemiarthroplasty.     SURGEON:  Dennis Cramer MD  Facility: [facility]    Attempted to reach patient by phone for final [facility] care transition follow up call. No answer , left a message on voicemail with reason for call and contact information.      Follow up appointments:    Future Appointments   Date Time Provider Payal Luna   2019 10:30 AM Carlos Manuel Vieyra MD [facility]   2019 11:30 AM ECHO ROOM 1 [facility] ECHO None   2019 12:45 PM Igor Childress MD  [facility]     Etelvina Alicea MSN, MA, RN  Care Transition Coordinator  [phone]

## 2019-08-30 ENCOUNTER — OFFICE VISIT (OUTPATIENT)
Dept: ORTHOPEDIC SURGERY | Age: 84
End: 2019-08-30
Payer: MEDICARE

## 2019-08-30 VITALS — WEIGHT: 149 LBS | HEIGHT: 65 IN | BODY MASS INDEX: 24.83 KG/M2

## 2019-08-30 DIAGNOSIS — S42.034A CLOSED NONDISPLACED FRACTURE OF ACROMIAL END OF RIGHT CLAVICLE, INITIAL ENCOUNTER: ICD-10-CM

## 2019-08-30 DIAGNOSIS — S72.002A CLOSED DISPLACED FRACTURE OF LEFT FEMORAL NECK (HCC): Primary | ICD-10-CM

## 2019-08-30 PROCEDURE — 1036F TOBACCO NON-USER: CPT | Performed by: ORTHOPAEDIC SURGERY

## 2019-08-30 PROCEDURE — 1090F PRES/ABSN URINE INCON ASSESS: CPT | Performed by: ORTHOPAEDIC SURGERY

## 2019-08-30 PROCEDURE — 1123F ACP DISCUSS/DSCN MKR DOCD: CPT | Performed by: ORTHOPAEDIC SURGERY

## 2019-08-30 PROCEDURE — G8427 DOCREV CUR MEDS BY ELIG CLIN: HCPCS | Performed by: ORTHOPAEDIC SURGERY

## 2019-08-30 PROCEDURE — 4040F PNEUMOC VAC/ADMIN/RCVD: CPT | Performed by: ORTHOPAEDIC SURGERY

## 2019-08-30 PROCEDURE — 99214 OFFICE O/P EST MOD 30 MIN: CPT | Performed by: ORTHOPAEDIC SURGERY

## 2019-08-30 PROCEDURE — G8420 CALC BMI NORM PARAMETERS: HCPCS | Performed by: ORTHOPAEDIC SURGERY

## 2019-08-30 NOTE — PROGRESS NOTES
DIAGNOSIS:    1-Left hip displaced femoral neck fracture, status post Press-Fit bipolar hemiarthroplasty. 2-Right distal clavicle minimally displaced fracture  3-Large laceration right knee    DATE OF SURGERY:  5/20/2019. HISTORY OF PRESENT ILLNESS:  Cyndee Pickett 80 y.o.  female who came in today for 3 months postoperative visit. She is here for f/u and her daughters state that she has not been walking and they feel that her left leg is shorter than her right. She is now home with HHPT and [facility]. She is here with her caregivers are helpful with her care. She denies pain in clavicle. She states that she has been up with PT but very minimally. No numbness or tingling sensation. No fever or Chills. Past Medical History:   Diagnosis Date    Anemia, pernicious     Arthritis     both hand (R worse than the L)    Cataract     both eyes    High blood pressure     previous history but off of medication for 1 year.  Hyperlipidemia     on statin    Hypothyroidism     oral supplementation    Macular degeneration     injection to left eye every 6 weeks    Melanoma ([facility] [address] 2014    lesions removed from nose and forehead    Nocturia     Urinary incontinence 2006    Vertigo 2006    treated with meclizine       Past Surgical History:   Procedure Laterality Date    CARDIOVERSION  01/2017    CATARACT REMOVAL  2010    bilateral removal    ENDOSCOPY, COLON, DIAGNOSTIC  5/23/16    Esophagogastroduodenoscopy with biopsy    HEMIARTHROPLASTY HIP Left 5/20/2019    LEFT HIP HEMIARTHROPLASTY performed by Bibiana Oglesby MD at [address] [place] [address]  2007    JOINT REPLACEMENT  2008    left  knee     MALIGNANT SKIN LESION EXCISION  2010    nose and forehead    PERCUTANEOUS BALLOON VALVULOPLASTY  5/20/2016    [facility]       Social History     Socioeconomic History    Marital status:       Spouse name: Not on file    Number of children: 11    Years of education: Not on file    Highest education

## 2019-11-07 ENCOUNTER — OFFICE VISIT (OUTPATIENT)
Dept: CARDIOLOGY CLINIC | Age: 84
End: 2019-11-07

## 2019-11-07 DIAGNOSIS — I48.0 PAROXYSMAL ATRIAL FIBRILLATION (HCC): ICD-10-CM

## 2019-11-07 DIAGNOSIS — Z95.2 S/P TAVR (TRANSCATHETER AORTIC VALVE REPLACEMENT): ICD-10-CM

## 2019-11-07 DIAGNOSIS — I10 ESSENTIAL HYPERTENSION: ICD-10-CM

## 2019-11-07 DIAGNOSIS — I35.0 NONRHEUMATIC AORTIC VALVE STENOSIS: Primary | ICD-10-CM

## 2019-11-07 DIAGNOSIS — E78.00 HYPERCHOLESTEREMIA: ICD-10-CM

## 2019-11-07 PROCEDURE — 99999 PR OFFICE/OUTPT VISIT,PROCEDURE ONLY: CPT | Performed by: INTERNAL MEDICINE

## 2023-07-17 NOTE — CONSULTS
Clinical Pharmacy Note  Warfarin Consult    Carrillo Ornelas is a 80 y.o. female receiving warfarin managed by pharmacy. Patient being bridged with none. Warfarin Indication: Afib  Target INR range: 2-3   Dose prior to admission: 5mg daily except 2.5mg Renetta Tijerina Thur    Current warfarin drug-drug interactions: Amiodarone (home med)    Recent Labs      11/15/18   0717  11/16/18   0501  11/17/18   0457   INR  2.33*  2.78*  2.96*       Assessment/Plan:    Hold Warfarin  tonight. It appears her maintenance dose prior to admission is not needed at this time. Confirmed with Celia (ortho) ok to dose. Daily PT/INR until stable within therapeutic range. Thank you for the consult. Will continue to follow.   Silvana Ellis RPh 11/17/2018 10:22 AM Pt has urinal at bedside, states unable to urinate at this time.

## (undated) DEVICE — SHEET,DRAPE,53X77,STERILE: Brand: MEDLINE

## (undated) DEVICE — GLOVE SURG SZ 6 L12IN FNGR THK87MIL WHT LTX FREE

## (undated) DEVICE — SOLUTION IV IRRIG POUR BRL 0.9% SODIUM CHL 2F7124

## (undated) DEVICE — 4-PORT MANIFOLD: Brand: NEPTUNE 2

## (undated) DEVICE — Z INACTIVE USE 2660664 SOLUTION IRRIG 3000ML 0.9% SOD CHL USP UROMATIC PLAS CONT

## (undated) DEVICE — FEMORAL CANAL TIP, IRRIGATION/SUCTION

## (undated) DEVICE — COVER LT HNDL BLU PLAS

## (undated) DEVICE — ELECTRODE PT RET AD L9FT HI MOIST COND ADH HYDRGEL CORDED

## (undated) DEVICE — HANDPIECE SET WITH HIGH FLOW TIP AND SUCTION TUBE: Brand: INTERPULSE

## (undated) DEVICE — SUTURE VCRL UD BR CT 3-0 18IN CT1 J838D

## (undated) DEVICE — SUTURE TICRN BR BL NDL C-20 SZ 5 30 8886302779

## (undated) DEVICE — STERILE TOTAL HIP DRAPE PK: Brand: CARDINAL HEALTH

## (undated) DEVICE — GLOVE SURG SZ 8 L12IN FNGR THK87MIL WHT LTX FREE

## (undated) DEVICE — DRAPE,U/SHT,SPLIT,FILM,60X84,STERILE: Brand: MEDLINE

## (undated) DEVICE — COVER,TABLE,77X90,STERILE: Brand: MEDLINE

## (undated) DEVICE — DRESSING FOAM W10XL20CM ANTIMIC SELF ADH SAFETAC TECHNOLOGY

## (undated) DEVICE — TUBING, SUCTION, 1/4" X 12', STRAIGHT: Brand: MEDLINE

## (undated) DEVICE — Z DISCONTINUED USE 2275686 GLOVE SURG SZ 8 L12IN FNGR THK13MIL WHT ISOLEX POLYISOPRENE

## (undated) DEVICE — SPONGE LAP W18XL18IN WHT COT 4 PLY FLD STRUNG RADPQ DISP ST

## (undated) DEVICE — DECANTER BAG 9": Brand: MEDLINE INDUSTRIES, INC.

## (undated) DEVICE — CHLORAPREP 26ML ORANGE

## (undated) DEVICE — KIT OR ROOM TURNOVER W/STRAP

## (undated) DEVICE — SUTURE VCRL SZ 1 L18IN ABSRB UD L36MM CT-1 1/2 CIR J841D

## (undated) DEVICE — SUTURE ABSORBABLE MONOFILAMENT 1-0 OS8 14 IN STRATAFIX SPRL SXPD2B202

## (undated) DEVICE — 3M™ IOBAN™ 2 ANTIMICROBIAL INCISE DRAPE 6650EZ: Brand: IOBAN™ 2

## (undated) DEVICE — STRIP,CLOSURE,WOUND,MEDI-STRIP,1/2X4: Brand: MEDLINE

## (undated) DEVICE — GLOVE SURG SZ 6 L12IN FNGR THK87MIL DK GRN LTX FREE ISOLEX

## (undated) DEVICE — TOTAL BASIC PK

## (undated) DEVICE — Z DISCONTINUED USE 2716304 SUTURE STRATAFIX SPRL SZ 3-0 L12IN ABSRB UD FS-1 L24MM 3/8

## (undated) DEVICE — CANISTER, RIGID, 1200CC: Brand: MEDLINE INDUSTRIES, INC.

## (undated) DEVICE — DUAL CUT SAGITTAL BLADE

## (undated) DEVICE — GOWN SIRUS NONREIN XL W/TWL: Brand: MEDLINE INDUSTRIES, INC.

## (undated) DEVICE — HYPODERMIC SAFETY NEEDLE: Brand: MAGELLAN

## (undated) DEVICE — SUTURE TICRON SZ 2 L30IN NONABSORBABLE BLU HGS-21 1/2 CIR 8886311381

## (undated) DEVICE — SYRINGE MED 30ML STD CLR PLAS LUERLOCK TIP N CTRL DISP

## (undated) DEVICE — 3M™ STERI-DRAPE™ U-DRAPE 1015: Brand: STERI-DRAPE™

## (undated) DEVICE — SUTURE STRATAFIX SPRL SZ 2 0 L14IN ABSRB UD MH L36MM 1 2 CIR SXMD2B401